# Patient Record
Sex: FEMALE | Race: WHITE | Employment: PART TIME | URBAN - METROPOLITAN AREA
[De-identification: names, ages, dates, MRNs, and addresses within clinical notes are randomized per-mention and may not be internally consistent; named-entity substitution may affect disease eponyms.]

---

## 2017-02-07 ENCOUNTER — HOSPITAL ENCOUNTER (OUTPATIENT)
Dept: GENERAL RADIOLOGY | Age: 37
Discharge: HOME | End: 2017-02-07
Attending: PSYCHIATRY & NEUROLOGY | Admitting: PSYCHIATRY & NEUROLOGY

## 2017-02-07 ENCOUNTER — OFFICE VISIT (OUTPATIENT)
Dept: FAMILY MEDICINE CLINIC | Age: 37
End: 2017-02-07

## 2017-02-07 VITALS
WEIGHT: 115 LBS | SYSTOLIC BLOOD PRESSURE: 104 MMHG | HEART RATE: 76 BPM | BODY MASS INDEX: 20.37 KG/M2 | TEMPERATURE: 98.4 F | DIASTOLIC BLOOD PRESSURE: 70 MMHG

## 2017-02-07 DIAGNOSIS — J01.00 ACUTE NON-RECURRENT MAXILLARY SINUSITIS: Primary | ICD-10-CM

## 2017-02-07 LAB
ALBUMIN SERPL-MCNC: 4.1 G/DL (ref 3.4–5)
ALP BLD-CCNC: 54 U/L (ref 40–129)
ALT SERPL-CCNC: 17 U/L (ref 10–40)
AST SERPL-CCNC: 22 U/L (ref 15–37)
BASOPHILS ABSOLUTE: 0.1 K/UL (ref 0–0.2)
BASOPHILS RELATIVE PERCENT: 1 %
BILIRUB SERPL-MCNC: <0.2 MG/DL (ref 0–1)
BILIRUBIN DIRECT: <0.2 MG/DL (ref 0–0.3)
BILIRUBIN, INDIRECT: NORMAL MG/DL (ref 0–1)
EOSINOPHILS ABSOLUTE: 0 K/UL (ref 0–0.6)
EOSINOPHILS RELATIVE PERCENT: 0.8 %
HCT VFR BLD CALC: 39.2 % (ref 36–48)
HEMOGLOBIN: 13.4 G/DL (ref 12–16)
INR BLD: 0.94 (ref 0.85–1.16)
LYMPHOCYTES ABSOLUTE: 2 K/UL (ref 1–5.1)
LYMPHOCYTES RELATIVE PERCENT: 35.1 %
MCH RBC QN AUTO: 32 PG (ref 26–34)
MCHC RBC AUTO-ENTMCNC: 34.2 G/DL (ref 31–36)
MCV RBC AUTO: 93.6 FL (ref 80–100)
MONOCYTES ABSOLUTE: 0.6 K/UL (ref 0–1.3)
MONOCYTES RELATIVE PERCENT: 9.9 %
NEUTROPHILS ABSOLUTE: 3.1 K/UL (ref 1.7–7.7)
NEUTROPHILS RELATIVE PERCENT: 53.2 %
PDW BLD-RTO: 12.8 % (ref 12.4–15.4)
PLATELET # BLD: 156 K/UL (ref 135–450)
PMV BLD AUTO: 9.4 FL (ref 5–10.5)
PROTHROMBIN TIME: 10.7 SEC (ref 9.8–13)
RBC # BLD: 4.19 M/UL (ref 4–5.2)
TOTAL PROTEIN: 7 G/DL (ref 6.4–8.2)
WBC # BLD: 5.8 K/UL (ref 4–11)

## 2017-02-07 PROCEDURE — 99213 OFFICE O/P EST LOW 20 MIN: CPT | Performed by: NURSE PRACTITIONER

## 2017-02-07 RX ORDER — FLUCONAZOLE 150 MG/1
150 TABLET ORAL ONCE
Qty: 1 TABLET | Refills: 0 | Status: SHIPPED | OUTPATIENT
Start: 2017-02-07 | End: 2017-02-07

## 2017-02-07 RX ORDER — CEFDINIR 300 MG/1
300 CAPSULE ORAL 2 TIMES DAILY
Qty: 14 CAPSULE | Refills: 0 | Status: SHIPPED | OUTPATIENT
Start: 2017-02-07 | End: 2017-02-14

## 2017-02-07 RX ORDER — FLUTICASONE PROPIONATE 50 MCG
1 SPRAY, SUSPENSION (ML) NASAL DAILY
Qty: 1 BOTTLE | Refills: 3 | Status: SHIPPED | OUTPATIENT
Start: 2017-02-07 | End: 2019-07-09 | Stop reason: SDUPTHER

## 2017-02-07 ASSESSMENT — ENCOUNTER SYMPTOMS
SORE THROAT: 1
COUGH: 0
WHEEZING: 0
SPUTUM PRODUCTION: 0
SHORTNESS OF BREATH: 0

## 2017-04-06 ENCOUNTER — OFFICE VISIT (OUTPATIENT)
Dept: FAMILY MEDICINE CLINIC | Age: 37
End: 2017-04-06

## 2017-04-06 VITALS
OXYGEN SATURATION: 99 % | SYSTOLIC BLOOD PRESSURE: 124 MMHG | DIASTOLIC BLOOD PRESSURE: 70 MMHG | WEIGHT: 118 LBS | BODY MASS INDEX: 20.9 KG/M2 | RESPIRATION RATE: 16 BRPM | HEART RATE: 84 BPM

## 2017-04-06 DIAGNOSIS — R11.0 NAUSEA: ICD-10-CM

## 2017-04-06 DIAGNOSIS — N39.0 URINARY TRACT INFECTION WITHOUT HEMATURIA, SITE UNSPECIFIED: Primary | ICD-10-CM

## 2017-04-06 DIAGNOSIS — R30.0 DYSURIA: ICD-10-CM

## 2017-04-06 LAB
BILIRUBIN, POC: NORMAL
BLOOD URINE, POC: NORMAL
CLARITY, POC: NORMAL
COLOR, POC: YELLOW
GLUCOSE URINE, POC: NORMAL
KETONES, POC: NORMAL
LEUKOCYTE EST, POC: NORMAL
NITRITE, POC: NORMAL
PH, POC: 5
PROTEIN, POC: NORMAL
SPECIFIC GRAVITY, POC: 1.02
UROBILINOGEN, POC: NORMAL

## 2017-04-06 PROCEDURE — 99213 OFFICE O/P EST LOW 20 MIN: CPT | Performed by: NURSE PRACTITIONER

## 2017-04-06 PROCEDURE — 81002 URINALYSIS NONAUTO W/O SCOPE: CPT | Performed by: NURSE PRACTITIONER

## 2017-04-06 RX ORDER — NITROFURANTOIN 25; 75 MG/1; MG/1
100 CAPSULE ORAL 2 TIMES DAILY
Qty: 14 CAPSULE | Refills: 0 | Status: SHIPPED | OUTPATIENT
Start: 2017-04-06 | End: 2017-04-13

## 2017-04-06 RX ORDER — ONDANSETRON 4 MG/1
4 TABLET, FILM COATED ORAL EVERY 8 HOURS PRN
Qty: 20 TABLET | Refills: 0 | Status: SHIPPED | OUTPATIENT
Start: 2017-04-06 | End: 2017-07-20 | Stop reason: ALTCHOICE

## 2017-04-06 ASSESSMENT — ENCOUNTER SYMPTOMS: NAUSEA: 1

## 2017-04-09 LAB
ORGANISM: ABNORMAL
URINE CULTURE, ROUTINE: ABNORMAL

## 2017-04-13 ENCOUNTER — TELEPHONE (OUTPATIENT)
Dept: FAMILY MEDICINE CLINIC | Age: 37
End: 2017-04-13

## 2017-04-13 DIAGNOSIS — Z12.31 ENCOUNTER FOR SCREENING MAMMOGRAM FOR BREAST CANCER: Primary | ICD-10-CM

## 2017-04-13 DIAGNOSIS — R92.8 ABNORMAL FINDINGS ON DIAGNOSTIC IMAGING OF BREAST: ICD-10-CM

## 2017-04-27 DIAGNOSIS — F17.200 TOBACCO USE DISORDER: Primary | ICD-10-CM

## 2017-04-27 RX ORDER — VARENICLINE TARTRATE 25 MG
KIT ORAL
Qty: 53 EACH | Refills: 0 | Status: SHIPPED | OUTPATIENT
Start: 2017-04-27 | End: 2017-05-01 | Stop reason: SDUPTHER

## 2017-04-27 RX ORDER — VARENICLINE TARTRATE 1 MG/1
1 TABLET, FILM COATED ORAL 2 TIMES DAILY
Qty: 60 TABLET | Refills: 3 | Status: SHIPPED | OUTPATIENT
Start: 2017-04-27 | End: 2017-05-01 | Stop reason: SDUPTHER

## 2017-05-01 ENCOUNTER — TELEPHONE (OUTPATIENT)
Dept: FAMILY MEDICINE CLINIC | Age: 37
End: 2017-05-01

## 2017-05-01 DIAGNOSIS — F17.200 TOBACCO USE DISORDER: ICD-10-CM

## 2017-05-01 RX ORDER — VARENICLINE TARTRATE 25 MG
KIT ORAL
Qty: 53 EACH | Refills: 0 | Status: SHIPPED | OUTPATIENT
Start: 2017-05-01 | End: 2018-05-30 | Stop reason: ALTCHOICE

## 2017-05-01 RX ORDER — VARENICLINE TARTRATE 1 MG/1
1 TABLET, FILM COATED ORAL 2 TIMES DAILY
Qty: 60 TABLET | Refills: 3 | Status: SHIPPED | OUTPATIENT
Start: 2017-05-01 | End: 2018-05-30 | Stop reason: SDUPTHER

## 2017-05-31 ENCOUNTER — TELEPHONE (OUTPATIENT)
Dept: FAMILY MEDICINE CLINIC | Age: 37
End: 2017-05-31

## 2017-06-12 ENCOUNTER — TELEPHONE (OUTPATIENT)
Dept: FAMILY MEDICINE CLINIC | Age: 37
End: 2017-06-12

## 2017-06-15 ENCOUNTER — TELEPHONE (OUTPATIENT)
Dept: FAMILY MEDICINE CLINIC | Age: 37
End: 2017-06-15

## 2017-06-16 ENCOUNTER — HOSPITAL ENCOUNTER (OUTPATIENT)
Dept: MAMMOGRAPHY | Age: 37
Discharge: HOME | End: 2017-06-16
Attending: SURGERY | Admitting: SURGERY

## 2017-06-16 DIAGNOSIS — Z12.31 ENCOUNTER FOR SCREENING MAMMOGRAM FOR BREAST CANCER: ICD-10-CM

## 2017-07-07 ENCOUNTER — TELEPHONE (OUTPATIENT)
Dept: FAMILY MEDICINE CLINIC | Age: 37
End: 2017-07-07

## 2017-07-20 ENCOUNTER — OFFICE VISIT (OUTPATIENT)
Dept: FAMILY MEDICINE CLINIC | Age: 37
End: 2017-07-20

## 2017-07-20 VITALS
DIASTOLIC BLOOD PRESSURE: 76 MMHG | OXYGEN SATURATION: 98 % | HEIGHT: 63 IN | BODY MASS INDEX: 20.38 KG/M2 | HEART RATE: 87 BPM | WEIGHT: 115 LBS | SYSTOLIC BLOOD PRESSURE: 108 MMHG

## 2017-07-20 DIAGNOSIS — F41.9 ANXIETY: ICD-10-CM

## 2017-07-20 DIAGNOSIS — G89.29 CHRONIC RIGHT SHOULDER PAIN: Primary | ICD-10-CM

## 2017-07-20 DIAGNOSIS — M25.511 CHRONIC RIGHT SHOULDER PAIN: Primary | ICD-10-CM

## 2017-07-20 PROCEDURE — 99214 OFFICE O/P EST MOD 30 MIN: CPT | Performed by: FAMILY MEDICINE

## 2017-07-20 RX ORDER — VALACYCLOVIR HYDROCHLORIDE 1 G/1
TABLET, FILM COATED ORAL
Qty: 4 TABLET | Refills: 1 | Status: SHIPPED | OUTPATIENT
Start: 2017-07-20 | End: 2018-05-30 | Stop reason: SDUPTHER

## 2017-08-22 ENCOUNTER — TELEPHONE (OUTPATIENT)
Dept: FAMILY MEDICINE CLINIC | Age: 37
End: 2017-08-22

## 2017-09-05 ENCOUNTER — TELEPHONE (OUTPATIENT)
Dept: FAMILY MEDICINE CLINIC | Age: 37
End: 2017-09-05

## 2017-09-20 DIAGNOSIS — M25.511 CHRONIC RIGHT SHOULDER PAIN: ICD-10-CM

## 2017-09-20 DIAGNOSIS — G89.29 CHRONIC RIGHT SHOULDER PAIN: ICD-10-CM

## 2017-12-01 ENCOUNTER — OFFICE VISIT (OUTPATIENT)
Dept: FAMILY MEDICINE CLINIC | Age: 37
End: 2017-12-01

## 2017-12-01 ENCOUNTER — TELEPHONE (OUTPATIENT)
Dept: FAMILY MEDICINE CLINIC | Age: 37
End: 2017-12-01

## 2017-12-01 VITALS
TEMPERATURE: 98.3 F | HEART RATE: 83 BPM | DIASTOLIC BLOOD PRESSURE: 64 MMHG | SYSTOLIC BLOOD PRESSURE: 110 MMHG | OXYGEN SATURATION: 98 % | WEIGHT: 119 LBS | BODY MASS INDEX: 21.08 KG/M2

## 2017-12-01 DIAGNOSIS — J39.2 LESION OF THROAT: Primary | ICD-10-CM

## 2017-12-01 PROCEDURE — 99213 OFFICE O/P EST LOW 20 MIN: CPT | Performed by: NURSE PRACTITIONER

## 2017-12-01 ASSESSMENT — ENCOUNTER SYMPTOMS
TROUBLE SWALLOWING: 0
SORE THROAT: 0

## 2017-12-01 NOTE — TELEPHONE ENCOUNTER
Referral request form completed and faxed to [address] [place] [address].  I will call patient when we receive the approval.

## 2017-12-01 NOTE — PROGRESS NOTES
Subjective:      Patient ID: Edmund Vieyra is a 40 y.o. female. HPI pt is here with area on her tonsils, that hurt when she hit it with water pick. Pt is worried about cancer since she is a smoker. Pt has fullness in ears and flem. Pt said nose starts running, every evening for the last year. Pt noticed the area on her tonsils last week. Review of Systems   Constitutional: Negative for chills, fatigue and fever. HENT: Negative for sore throat and trouble swallowing. All other systems reviewed and are negative. Objective:   Physical Exam   Constitutional: She is oriented to person, place, and time. She appears well-developed and well-nourished. HENT:   0.5cm lesion on right side of throat behind tonsil, yellow. Neurological: She is alert and oriented to person, place, and time. Skin: Skin is warm and dry. Psychiatric: She has a normal mood and affect. Her behavior is normal. Judgment and thought content normal.   Vitals reviewed. Assessment:      1. Lesion of throat  Ambulatory referral to ENT           Plan:      Josy Vitale was seen today for other. Diagnoses and all orders for this visit:    Lesion of throat - Unsure of etiology of lesion in the right side of throat. We'll send patient to ENT. Patient does smoke therefore patient is concerned about cancer. Patient could've had lesion for a long time and just noticed recently. Patient educated to really consider to quit smoking. Patient states understanding.  -     Ambulatory referral to ENT      Patient follow-up with ENT.

## 2017-12-01 NOTE — TELEPHONE ENCOUNTER
Oli Ball   1980    Is requesting a referral to see the following specialist:     Name of provider / Doctor: Dion Mcdaniel  Phone   [phone]     Fax. [phone]  Specialty:  ENT  NPI #  If available:  [id number]  TAX ID# If available:  Specialist appointment scheduled (DOS)? ASAP  Primary reason / diagnosis (ICD. 10code) for the appointment:    J39.2 (ICD-10-CM) - Lesion of throat  Primary Insurance:     [employer] insurance)

## 2017-12-08 ENCOUNTER — TELEPHONE (OUTPATIENT)
Dept: FAMILY MEDICINE CLINIC | Age: 37
End: 2017-12-08

## 2017-12-08 NOTE — TELEPHONE ENCOUNTER
Patient left  at 12:44 about her referral to ENT, stated when she called to schedule the appointment they told her she needed to see a different doctor. She needs to know if the information needs changed and has some other questions for [facility], [address]. Please call the patient.

## 2018-02-16 ENCOUNTER — TELEPHONE (OUTPATIENT)
Dept: FAMILY MEDICINE CLINIC | Age: 38
End: 2018-02-16

## 2018-02-16 DIAGNOSIS — F32.A DEPRESSION, UNSPECIFIED DEPRESSION TYPE: Primary | ICD-10-CM

## 2018-04-09 ENCOUNTER — TELEPHONE (OUTPATIENT)
Dept: FAMILY MEDICINE CLINIC | Age: 38
End: 2018-04-09

## 2018-05-21 ENCOUNTER — TELEPHONE (OUTPATIENT)
Dept: FAMILY MEDICINE CLINIC | Age: 38
End: 2018-05-21

## 2018-05-21 DIAGNOSIS — Z00.00 WELL WOMAN EXAM (NO GYNECOLOGICAL EXAM): Primary | ICD-10-CM

## 2018-05-30 ENCOUNTER — OFFICE VISIT (OUTPATIENT)
Dept: FAMILY MEDICINE CLINIC | Age: 38
End: 2018-05-30

## 2018-05-30 VITALS
OXYGEN SATURATION: 99 % | HEIGHT: 63 IN | SYSTOLIC BLOOD PRESSURE: 108 MMHG | HEART RATE: 118 BPM | RESPIRATION RATE: 16 BRPM | DIASTOLIC BLOOD PRESSURE: 72 MMHG

## 2018-05-30 DIAGNOSIS — F41.9 ANXIETY: ICD-10-CM

## 2018-05-30 DIAGNOSIS — F90.9 ATTENTION DEFICIT HYPERACTIVITY DISORDER (ADHD), UNSPECIFIED ADHD TYPE: Primary | ICD-10-CM

## 2018-05-30 DIAGNOSIS — F17.200 TOBACCO USE DISORDER: ICD-10-CM

## 2018-05-30 PROCEDURE — 99214 OFFICE O/P EST MOD 30 MIN: CPT | Performed by: FAMILY MEDICINE

## 2018-05-30 RX ORDER — VALACYCLOVIR HYDROCHLORIDE 1 G/1
TABLET, FILM COATED ORAL
Qty: 4 TABLET | Refills: 1 | Status: SHIPPED | OUTPATIENT
Start: 2018-05-30 | End: 2019-12-03 | Stop reason: SDUPTHER

## 2018-05-30 RX ORDER — METHYLPHENIDATE HYDROCHLORIDE 20 MG/1
20 CAPSULE, EXTENDED RELEASE ORAL EVERY MORNING
Qty: 30 CAPSULE | Refills: 0 | Status: SHIPPED | OUTPATIENT
Start: 2018-05-30 | End: 2018-08-02

## 2018-05-30 RX ORDER — VARENICLINE TARTRATE 1 MG/1
1 TABLET, FILM COATED ORAL 2 TIMES DAILY
Qty: 60 TABLET | Refills: 3 | Status: SHIPPED | OUTPATIENT
Start: 2018-05-30 | End: 2019-07-09 | Stop reason: SDUPTHER

## 2018-06-19 ENCOUNTER — OFFICE VISIT (OUTPATIENT)
Dept: FAMILY MEDICINE CLINIC | Age: 38
End: 2018-06-19

## 2018-06-19 VITALS
WEIGHT: 124 LBS | HEART RATE: 96 BPM | SYSTOLIC BLOOD PRESSURE: 114 MMHG | BODY MASS INDEX: 21.97 KG/M2 | DIASTOLIC BLOOD PRESSURE: 70 MMHG | OXYGEN SATURATION: 99 %

## 2018-06-19 DIAGNOSIS — F90.9 ATTENTION DEFICIT HYPERACTIVITY DISORDER (ADHD), UNSPECIFIED ADHD TYPE: Primary | ICD-10-CM

## 2018-06-19 PROCEDURE — 99213 OFFICE O/P EST LOW 20 MIN: CPT | Performed by: FAMILY MEDICINE

## 2018-06-19 ASSESSMENT — PATIENT HEALTH QUESTIONNAIRE - PHQ9
1. LITTLE INTEREST OR PLEASURE IN DOING THINGS: 1
SUM OF ALL RESPONSES TO PHQ QUESTIONS 1-9: 1
SUM OF ALL RESPONSES TO PHQ9 QUESTIONS 1 & 2: 1
2. FEELING DOWN, DEPRESSED OR HOPELESS: 0

## 2018-07-19 ENCOUNTER — TELEPHONE (OUTPATIENT)
Dept: FAMILY MEDICINE CLINIC | Age: 38
End: 2018-07-19

## 2018-07-19 NOTE — TELEPHONE ENCOUNTER
Patient called and states that she needs a referral sent to [facility] for a mammogram/Jose to Dr. Helena Kawasaki at the [facility]. Patient states that her appiontment on 07.20. 18.

## 2018-07-20 ENCOUNTER — OFFICE VISIT (OUTPATIENT)
Dept: FAMILY MEDICINE CLINIC | Age: 38
End: 2018-07-20

## 2018-07-20 ENCOUNTER — HOSPITAL ENCOUNTER (OUTPATIENT)
Dept: WOMENS IMAGING | Age: 38
Discharge: HOME | End: 2018-07-20
Payer: OTHER GOVERNMENT

## 2018-07-20 VITALS
HEART RATE: 93 BPM | OXYGEN SATURATION: 98 % | WEIGHT: 122 LBS | DIASTOLIC BLOOD PRESSURE: 68 MMHG | SYSTOLIC BLOOD PRESSURE: 104 MMHG | BODY MASS INDEX: 21.61 KG/M2

## 2018-07-20 DIAGNOSIS — F41.9 ANXIETY: Primary | ICD-10-CM

## 2018-07-20 DIAGNOSIS — F90.9 ATTENTION DEFICIT HYPERACTIVITY DISORDER (ADHD), UNSPECIFIED ADHD TYPE: ICD-10-CM

## 2018-07-20 DIAGNOSIS — Z12.31 ENCOUNTER FOR SCREENING MAMMOGRAM FOR BREAST CANCER: ICD-10-CM

## 2018-07-20 PROCEDURE — 77067 SCR MAMMO BI INCL CAD: CPT

## 2018-07-20 PROCEDURE — 99213 OFFICE O/P EST LOW 20 MIN: CPT | Performed by: FAMILY MEDICINE

## 2018-07-20 RX ORDER — METHYLPHENIDATE HYDROCHLORIDE 20 MG/1
20 CAPSULE, EXTENDED RELEASE ORAL EVERY MORNING
Qty: 30 CAPSULE | Refills: 0 | Status: CANCELLED | OUTPATIENT
Start: 2018-07-20 | End: 2018-08-19

## 2018-07-20 RX ORDER — HYDROXYZINE PAMOATE 25 MG/1
25 CAPSULE ORAL 4 TIMES DAILY PRN
Qty: 60 CAPSULE | Refills: 3 | Status: SHIPPED | OUTPATIENT
Start: 2018-07-20 | End: 2021-10-19 | Stop reason: SDUPTHER

## 2018-07-23 NOTE — PROGRESS NOTES
Subjective:      Patient ID: Yin Malcolm is a 45 y.o. female. Yin Malcolm is a 45 y.o. female. Patient presents with:  ADHD        ADHD:  Has been well controlled. Taking medications regularly. Denies chest pain or side effects from the medications. Notes she is sleeping well and not having any problems with appetite. The patients PMH, surgical history, family history, medications, allergies were all reviewed and updated as appropriate today. Fatigue   Associated symptoms include fatigue. Review of Systems   Constitutional: Positive for fatigue. Objective:   Physical Exam   Constitutional: She is oriented to person, place, and time. She appears well-developed and well-nourished. HENT:   Head: Normocephalic and atraumatic. Right Ear: External ear normal.   Left Ear: External ear normal.   Nose: Nose normal.   Mouth/Throat: Oropharynx is clear and moist.   Eyes: Conjunctivae are normal. Pupils are equal, round, and reactive to light. Neck: Normal range of motion. Neck supple. Cardiovascular: Normal rate, regular rhythm, normal heart sounds and intact distal pulses. Pulmonary/Chest: Effort normal and breath sounds normal.   Abdominal: Soft. Bowel sounds are normal.   Musculoskeletal: Normal range of motion. Neurological: She is alert and oriented to person, place, and time. She has normal reflexes. Skin: Skin is dry. Psychiatric: She has a normal mood and affect. Her behavior is normal. Judgment and thought content normal.   Nursing note and vitals reviewed. Assessment:      Encounter Diagnoses   Name Primary?  Attention deficit hyperactivity disorder (ADHD), unspecified ADHD type     Anxiety Yes           Plan:      1. Attention deficit hyperactivity disorder (ADHD), unspecified ADHD type    - Lisdexamfetamine Dimesylate (VYVANSE) 60 MG CAPS; Take 60 mg by mouth daily for 30 days. .  Dispense: 30 capsule; Refill: 0    2.  Anxiety    - hydrOXYzine (VISTARIL) 25 MG

## 2018-08-02 ENCOUNTER — OFFICE VISIT (OUTPATIENT)
Dept: FAMILY MEDICINE CLINIC | Age: 38
End: 2018-08-02

## 2018-08-02 VITALS
BODY MASS INDEX: 21.26 KG/M2 | WEIGHT: 120 LBS | OXYGEN SATURATION: 99 % | DIASTOLIC BLOOD PRESSURE: 78 MMHG | HEIGHT: 63 IN | HEART RATE: 106 BPM | SYSTOLIC BLOOD PRESSURE: 122 MMHG

## 2018-08-02 DIAGNOSIS — T38.0X5A ADVERSE EFFECT OF CORTICOSTEROIDS, INITIAL ENCOUNTER: Primary | ICD-10-CM

## 2018-08-02 DIAGNOSIS — R35.0 URINE FREQUENCY: ICD-10-CM

## 2018-08-02 LAB
BILIRUBIN, POC: NEGATIVE
BLOOD URINE, POC: NEGATIVE
CLARITY, POC: CLEAR
COLOR, POC: YELLOW
GLUCOSE URINE, POC: NEGATIVE
KETONES, POC: NEGATIVE
LEUKOCYTE EST, POC: NEGATIVE
NITRITE, POC: NEGATIVE
PH, POC: 6
PROTEIN, POC: NEGATIVE
SPECIFIC GRAVITY, POC: 1.02
UROBILINOGEN, POC: 0.2

## 2018-08-02 PROCEDURE — 99213 OFFICE O/P EST LOW 20 MIN: CPT | Performed by: FAMILY MEDICINE

## 2018-08-02 PROCEDURE — 81002 URINALYSIS NONAUTO W/O SCOPE: CPT | Performed by: FAMILY MEDICINE

## 2018-08-02 NOTE — PROGRESS NOTES
likely secondary to having been on steroids recently. Patient was told that in the next couple days if symptoms don't resolve she should call back.

## 2018-08-17 ENCOUNTER — OFFICE VISIT (OUTPATIENT)
Dept: FAMILY MEDICINE CLINIC | Age: 38
End: 2018-08-17

## 2018-08-17 VITALS
HEART RATE: 105 BPM | OXYGEN SATURATION: 99 % | DIASTOLIC BLOOD PRESSURE: 66 MMHG | SYSTOLIC BLOOD PRESSURE: 120 MMHG | BODY MASS INDEX: 21.43 KG/M2 | WEIGHT: 121 LBS

## 2018-08-17 DIAGNOSIS — F90.9 ATTENTION DEFICIT HYPERACTIVITY DISORDER (ADHD), UNSPECIFIED ADHD TYPE: ICD-10-CM

## 2018-08-17 PROCEDURE — 99213 OFFICE O/P EST LOW 20 MIN: CPT | Performed by: FAMILY MEDICINE

## 2018-08-27 NOTE — PROGRESS NOTES
Subjective:      Patient ID: Alma Galan is a 45 y.o. female. Alma Galan is a 45 y.o. female. Patient presents with:  ADHD  Fatigue: stopped all vitamins for surgery       ADHD:  Has been well controlled. Taking medications regularly. Denies chest pain or side effects from the medications. Notes she is sleeping well and not having any problems with appetite. The patients PMH, surgical history, family history, medications, allergies were all reviewed and updated as appropriate today. Fatigue   Associated symptoms include fatigue. Review of Systems   Constitutional: Positive for fatigue. Objective:   Physical Exam   Constitutional: She is oriented to person, place, and time. She appears well-developed and well-nourished. HENT:   Head: Normocephalic and atraumatic. Right Ear: External ear normal.   Left Ear: External ear normal.   Nose: Nose normal.   Mouth/Throat: Oropharynx is clear and moist.   Eyes: Pupils are equal, round, and reactive to light. Conjunctivae are normal.   Neck: Normal range of motion. Neck supple. Cardiovascular: Normal rate, regular rhythm, normal heart sounds and intact distal pulses. Pulmonary/Chest: Effort normal and breath sounds normal.   Abdominal: Soft. Bowel sounds are normal.   Musculoskeletal: Normal range of motion. Neurological: She is alert and oriented to person, place, and time. She has normal reflexes. Skin: Skin is dry. Psychiatric: She has a normal mood and affect. Her behavior is normal. Judgment and thought content normal.   Nursing note and vitals reviewed. Assessment:      Encounter Diagnosis   Name Primary?  Attention deficit hyperactivity disorder (ADHD), unspecified ADHD type            Plan:     1. Attention deficit hyperactivity disorder (ADHD), unspecified ADHD type    - lisdexamfetamine (VYVANSE) 70 MG capsule; Take 1 capsule by mouth every morning for 30 days. .  Dispense: 30 capsule;  Refill: 0

## 2018-09-20 DIAGNOSIS — F90.9 ATTENTION DEFICIT HYPERACTIVITY DISORDER (ADHD), UNSPECIFIED ADHD TYPE: ICD-10-CM

## 2018-09-28 ENCOUNTER — TELEPHONE (OUTPATIENT)
Dept: FAMILY MEDICINE CLINIC | Age: 38
End: 2018-09-28

## 2018-09-28 NOTE — TELEPHONE ENCOUNTER
Patient called and stated that she would like to speak with Rehan. She said that she has come concerns that she needed to talk to her about. I let her know that Rehan is out of the office today and that I could have Rehan call her on Monday when she is back in the office and the patient stated that that was fine. Patient did not give me any information as to what it is regarding, just that she had some concerns that she needed to talk to Lisandraay about.

## 2018-10-29 DIAGNOSIS — F90.9 ATTENTION DEFICIT HYPERACTIVITY DISORDER (ADHD), UNSPECIFIED ADHD TYPE: ICD-10-CM

## 2018-10-29 NOTE — TELEPHONE ENCOUNTER
From: Mack Danielle  Sent: 10/28/2018 8:53 PM EDT  Subject: Medication Renewal Request    Nikhil Weir.  [facility] would like a refill of the following medications:     lisdexamfetamine (VYVANSE) 70 MG capsule Kim Sood MD]    Preferred pharmacy: [address] - F [phone]

## 2018-11-13 ENCOUNTER — TELEPHONE (OUTPATIENT)
Dept: FAMILY MEDICINE CLINIC | Age: 38
End: 2018-11-13

## 2018-11-13 ENCOUNTER — OFFICE VISIT (OUTPATIENT)
Dept: FAMILY MEDICINE CLINIC | Age: 38
End: 2018-11-13
Payer: OTHER GOVERNMENT

## 2018-11-13 VITALS
SYSTOLIC BLOOD PRESSURE: 114 MMHG | WEIGHT: 125 LBS | BODY MASS INDEX: 22.14 KG/M2 | DIASTOLIC BLOOD PRESSURE: 70 MMHG | OXYGEN SATURATION: 99 % | HEART RATE: 102 BPM

## 2018-11-13 DIAGNOSIS — F90.9 ATTENTION DEFICIT HYPERACTIVITY DISORDER (ADHD), UNSPECIFIED ADHD TYPE: Primary | ICD-10-CM

## 2018-11-13 DIAGNOSIS — F41.9 ANXIETY: ICD-10-CM

## 2018-11-13 PROCEDURE — 99213 OFFICE O/P EST LOW 20 MIN: CPT | Performed by: FAMILY MEDICINE

## 2018-11-13 RX ORDER — AMPHETAMINE SULFATE 10 MG/1
10 TABLET ORAL 2 TIMES DAILY
Qty: 60 TABLET | Refills: 0 | Status: SHIPPED | OUTPATIENT
Start: 2018-11-13 | End: 2019-01-25 | Stop reason: SDUPTHER

## 2018-11-13 RX ORDER — AMPHETAMINE SULFATE 10 MG/1
10 TABLET ORAL 2 TIMES DAILY
Qty: 60 TABLET | Refills: 0 | Status: SHIPPED | OUTPATIENT
Start: 2018-11-13 | End: 2018-11-13 | Stop reason: SDUPTHER

## 2018-11-13 NOTE — TELEPHONE ENCOUNTER
Boaz Morton   1980    Is requesting a referral to see the following specialist:     Name of provider / Doctor: Jaya Aburto    Phone. [phone]  Fax. Specialty: Counseling  NPI #  If available:  TAX ID# If available:  Specialist appointment scheduled (DOS)? Not scheduled yet  Primary reason / diagnosis (ICD. 10code) for the appointment: Anxiety F41.9  Primary Insurance: [employer] insurance)

## 2018-11-19 ENCOUNTER — TELEPHONE (OUTPATIENT)
Dept: FAMILY MEDICINE CLINIC | Age: 38
End: 2018-11-19

## 2018-11-20 NOTE — TELEPHONE ENCOUNTER
New referral to Dr. Heather Russell completed and faxed to [address] [place] [address]. Requested to backdate to 10/25/18. Form scanned to chart. Authorization is pending.

## 2018-11-24 ASSESSMENT — ENCOUNTER SYMPTOMS: COUGH: 1

## 2019-01-25 DIAGNOSIS — F90.9 ATTENTION DEFICIT HYPERACTIVITY DISORDER (ADHD), UNSPECIFIED ADHD TYPE: ICD-10-CM

## 2019-01-25 RX ORDER — AMPHETAMINE SULFATE 10 MG/1
10 TABLET ORAL 2 TIMES DAILY
Qty: 60 TABLET | Refills: 0 | Status: SHIPPED | OUTPATIENT
Start: 2019-01-25 | End: 2019-02-14 | Stop reason: SDUPTHER

## 2019-02-14 ENCOUNTER — OFFICE VISIT (OUTPATIENT)
Dept: FAMILY MEDICINE CLINIC | Age: 39
End: 2019-02-14
Payer: OTHER GOVERNMENT

## 2019-02-14 VITALS
SYSTOLIC BLOOD PRESSURE: 116 MMHG | OXYGEN SATURATION: 98 % | WEIGHT: 130 LBS | DIASTOLIC BLOOD PRESSURE: 78 MMHG | HEART RATE: 85 BPM | BODY MASS INDEX: 23.03 KG/M2

## 2019-02-14 DIAGNOSIS — F90.9 ATTENTION DEFICIT HYPERACTIVITY DISORDER (ADHD), UNSPECIFIED ADHD TYPE: ICD-10-CM

## 2019-02-14 PROCEDURE — 99213 OFFICE O/P EST LOW 20 MIN: CPT | Performed by: FAMILY MEDICINE

## 2019-02-14 RX ORDER — AMPHETAMINE SULFATE 10 MG/1
10 TABLET ORAL 2 TIMES DAILY
Qty: 60 TABLET | Refills: 0 | Status: SHIPPED | OUTPATIENT
Start: 2019-02-14 | End: 2019-04-08 | Stop reason: SDUPTHER

## 2019-02-14 ASSESSMENT — ENCOUNTER SYMPTOMS: COUGH: 1

## 2019-02-21 ENCOUNTER — TELEPHONE (OUTPATIENT)
Dept: FAMILY MEDICINE CLINIC | Age: 39
End: 2019-02-21

## 2019-03-05 ENCOUNTER — TELEPHONE (OUTPATIENT)
Dept: FAMILY MEDICINE CLINIC | Age: 39
End: 2019-03-05

## 2019-03-05 DIAGNOSIS — K21.9 LARYNGOPHARYNGEAL REFLUX (LPR): Primary | ICD-10-CM

## 2019-03-05 NOTE — TELEPHONE ENCOUNTER
Referral ordered. Pt informed. Referral still needs approved through [address] [place] [address].

## 2019-03-05 NOTE — TELEPHONE ENCOUNTER
Patient called and states that she saw Dr. Everardo Kuhn and he told her that she should see a GI for LPR. Patient states that Dr. Everardo Kuhn did not give her any names of GI doctors. Patient states that she needs to know who Dr. Yakov Washington would recommend. Patient is on  and will also need a referral sent to  once she decides to she is going to see.

## 2019-03-12 ENCOUNTER — INITIAL CONSULT (OUTPATIENT)
Dept: GASTROENTEROLOGY | Age: 39
End: 2019-03-12
Payer: OTHER GOVERNMENT

## 2019-03-12 VITALS
WEIGHT: 122 LBS | DIASTOLIC BLOOD PRESSURE: 70 MMHG | SYSTOLIC BLOOD PRESSURE: 118 MMHG | BODY MASS INDEX: 21.62 KG/M2 | HEIGHT: 63 IN

## 2019-03-12 DIAGNOSIS — R09.89 GLOBUS SENSATION: ICD-10-CM

## 2019-03-12 DIAGNOSIS — R13.10 DYSPHAGIA, UNSPECIFIED TYPE: Primary | ICD-10-CM

## 2019-03-12 PROBLEM — R09.A2 GLOBUS SENSATION: Status: ACTIVE | Noted: 2019-03-12

## 2019-03-12 PROCEDURE — 99204 OFFICE O/P NEW MOD 45 MIN: CPT | Performed by: INTERNAL MEDICINE

## 2019-03-20 RX ORDER — OMEPRAZOLE 20 MG/1
20 CAPSULE, DELAYED RELEASE ORAL DAILY
COMMUNITY

## 2019-03-20 SDOH — HEALTH STABILITY: MENTAL HEALTH: HOW OFTEN DO YOU HAVE A DRINK CONTAINING ALCOHOL?: NEVER

## 2019-03-21 NOTE — TELEPHONE ENCOUNTER
Patient called to see if the referral for GI was sent to [address] [place] [address]. Since Eb Cameron is no longer with [facility], I talked to Vicki Bumpers, who is now doing the referrals. I let her know that the patient had seen Dr. Hope Byrne on 03.12.19 and she will work on the backdated referral.      Barbie Raines,    I spoke to the patient today and she states that she is scheduled for a EGD on 03.27.19 at [facility]. Do you need to do the referral for the EGD as well ?

## 2019-03-26 ENCOUNTER — TELEPHONE (OUTPATIENT)
Dept: GASTROENTEROLOGY | Age: 39
End: 2019-03-26

## 2019-03-27 ENCOUNTER — ANESTHESIA EVENT (OUTPATIENT)
Dept: ENDOSCOPY | Age: 39
End: 2019-03-27
Payer: OTHER GOVERNMENT

## 2019-03-27 ENCOUNTER — ANESTHESIA (OUTPATIENT)
Dept: ENDOSCOPY | Age: 39
End: 2019-03-27
Payer: OTHER GOVERNMENT

## 2019-03-27 ENCOUNTER — HOSPITAL ENCOUNTER (OUTPATIENT)
Age: 39
Setting detail: OUTPATIENT SURGERY
Discharge: HOME | End: 2019-03-27
Attending: INTERNAL MEDICINE | Admitting: INTERNAL MEDICINE
Payer: OTHER GOVERNMENT

## 2019-03-27 VITALS
HEIGHT: 63 IN | WEIGHT: 127 LBS | RESPIRATION RATE: 20 BRPM | SYSTOLIC BLOOD PRESSURE: 105 MMHG | DIASTOLIC BLOOD PRESSURE: 75 MMHG | OXYGEN SATURATION: 97 % | BODY MASS INDEX: 22.5 KG/M2 | HEART RATE: 80 BPM | TEMPERATURE: 98.1 F

## 2019-03-27 VITALS — SYSTOLIC BLOOD PRESSURE: 108 MMHG | DIASTOLIC BLOOD PRESSURE: 71 MMHG | OXYGEN SATURATION: 98 %

## 2019-03-27 LAB — PREGNANCY, URINE: NEGATIVE

## 2019-03-27 PROCEDURE — 2709999900 HC NON-CHARGEABLE SUPPLY: Performed by: INTERNAL MEDICINE

## 2019-03-27 PROCEDURE — 3609012700 HC EGD DILATION SAVORY: Performed by: INTERNAL MEDICINE

## 2019-03-27 PROCEDURE — 2500000003 HC RX 250 WO HCPCS: Performed by: NURSE ANESTHETIST, CERTIFIED REGISTERED

## 2019-03-27 PROCEDURE — 88305 TISSUE EXAM BY PATHOLOGIST: CPT

## 2019-03-27 PROCEDURE — 7100000010 HC PHASE II RECOVERY - FIRST 15 MIN: Performed by: INTERNAL MEDICINE

## 2019-03-27 PROCEDURE — 6360000002 HC RX W HCPCS: Performed by: NURSE ANESTHETIST, CERTIFIED REGISTERED

## 2019-03-27 PROCEDURE — 2580000003 HC RX 258: Performed by: INTERNAL MEDICINE

## 2019-03-27 PROCEDURE — 3700000001 HC ADD 15 MINUTES (ANESTHESIA): Performed by: INTERNAL MEDICINE

## 2019-03-27 PROCEDURE — 3700000000 HC ANESTHESIA ATTENDED CARE: Performed by: INTERNAL MEDICINE

## 2019-03-27 PROCEDURE — 2580000003 HC RX 258: Performed by: NURSE ANESTHETIST, CERTIFIED REGISTERED

## 2019-03-27 PROCEDURE — 43248 EGD GUIDE WIRE INSERTION: CPT | Performed by: INTERNAL MEDICINE

## 2019-03-27 PROCEDURE — 3609012400 HC EGD TRANSORAL BIOPSY SINGLE/MULTIPLE: Performed by: INTERNAL MEDICINE

## 2019-03-27 PROCEDURE — 84703 CHORIONIC GONADOTROPIN ASSAY: CPT

## 2019-03-27 RX ORDER — LIDOCAINE HYDROCHLORIDE 20 MG/ML
INJECTION, SOLUTION INFILTRATION; PERINEURAL PRN
Status: DISCONTINUED | OUTPATIENT
Start: 2019-03-27 | End: 2019-03-27 | Stop reason: SDUPTHER

## 2019-03-27 RX ORDER — PROPOFOL 10 MG/ML
INJECTION, EMULSION INTRAVENOUS PRN
Status: DISCONTINUED | OUTPATIENT
Start: 2019-03-27 | End: 2019-03-27 | Stop reason: SDUPTHER

## 2019-03-27 RX ORDER — SODIUM CHLORIDE, SODIUM LACTATE, POTASSIUM CHLORIDE, CALCIUM CHLORIDE 600; 310; 30; 20 MG/100ML; MG/100ML; MG/100ML; MG/100ML
INJECTION, SOLUTION INTRAVENOUS CONTINUOUS PRN
Status: DISCONTINUED | OUTPATIENT
Start: 2019-03-27 | End: 2019-03-27 | Stop reason: SDUPTHER

## 2019-03-27 RX ORDER — SODIUM CHLORIDE, SODIUM LACTATE, POTASSIUM CHLORIDE, CALCIUM CHLORIDE 600; 310; 30; 20 MG/100ML; MG/100ML; MG/100ML; MG/100ML
1000 INJECTION, SOLUTION INTRAVENOUS ONCE
Status: COMPLETED | OUTPATIENT
Start: 2019-03-27 | End: 2019-03-27

## 2019-03-27 RX ADMIN — SODIUM CHLORIDE, POTASSIUM CHLORIDE, SODIUM LACTATE AND CALCIUM CHLORIDE 1000 ML: 600; 310; 30; 20 INJECTION, SOLUTION INTRAVENOUS at 09:02

## 2019-03-27 RX ADMIN — PROPOFOL 30 MG: 10 INJECTION, EMULSION INTRAVENOUS at 09:54

## 2019-03-27 RX ADMIN — PROPOFOL 20 MG: 10 INJECTION, EMULSION INTRAVENOUS at 09:52

## 2019-03-27 RX ADMIN — PROPOFOL 20 MG: 10 INJECTION, EMULSION INTRAVENOUS at 09:48

## 2019-03-27 RX ADMIN — LIDOCAINE HYDROCHLORIDE 40 MG: 20 INJECTION, SOLUTION INFILTRATION; PERINEURAL at 09:45

## 2019-03-27 RX ADMIN — PROPOFOL 20 MG: 10 INJECTION, EMULSION INTRAVENOUS at 09:46

## 2019-03-27 RX ADMIN — SODIUM CHLORIDE, POTASSIUM CHLORIDE, SODIUM LACTATE AND CALCIUM CHLORIDE: 600; 310; 30; 20 INJECTION, SOLUTION INTRAVENOUS at 09:41

## 2019-03-27 RX ADMIN — PROPOFOL 80 MG: 10 INJECTION, EMULSION INTRAVENOUS at 09:45

## 2019-03-27 RX ADMIN — PROPOFOL 30 MG: 10 INJECTION, EMULSION INTRAVENOUS at 09:50

## 2019-03-27 ASSESSMENT — PAIN SCALES - GENERAL: PAINLEVEL_OUTOF10: 0

## 2019-03-27 ASSESSMENT — LIFESTYLE VARIABLES: SMOKING_STATUS: 1

## 2019-04-03 ENCOUNTER — TELEPHONE (OUTPATIENT)
Dept: GASTROENTEROLOGY | Age: 39
End: 2019-04-03

## 2019-04-08 DIAGNOSIS — F90.9 ATTENTION DEFICIT HYPERACTIVITY DISORDER (ADHD), UNSPECIFIED ADHD TYPE: ICD-10-CM

## 2019-04-08 RX ORDER — AMPHETAMINE SULFATE 10 MG/1
10 TABLET ORAL 2 TIMES DAILY
Qty: 60 TABLET | Refills: 0 | Status: SHIPPED | OUTPATIENT
Start: 2019-04-08 | End: 2019-07-09 | Stop reason: SDUPTHER

## 2019-04-25 ENCOUNTER — TELEPHONE (OUTPATIENT)
Dept: GASTROENTEROLOGY | Age: 39
End: 2019-04-25

## 2019-04-25 DIAGNOSIS — R13.10 DYSPHAGIA, UNSPECIFIED TYPE: Primary | ICD-10-CM

## 2019-04-25 NOTE — TELEPHONE ENCOUNTER
Pt called in stating she is still having symptoms with swallowing multiple times and having the feeling of a lump in her throat. She would like to move forward with the Barium Swallow.

## 2019-05-09 ENCOUNTER — HOSPITAL ENCOUNTER (OUTPATIENT)
Dept: GENERAL RADIOLOGY | Age: 39
Discharge: HOME | End: 2019-05-09
Payer: OTHER GOVERNMENT

## 2019-05-09 DIAGNOSIS — R13.10 DYSPHAGIA, UNSPECIFIED TYPE: ICD-10-CM

## 2019-05-09 PROCEDURE — 74230 X-RAY XM SWLNG FUNCJ C+: CPT

## 2019-06-28 ENCOUNTER — TELEPHONE (OUTPATIENT)
Dept: FAMILY MEDICINE CLINIC | Age: 39
End: 2019-06-28

## 2019-07-09 ENCOUNTER — OFFICE VISIT (OUTPATIENT)
Dept: FAMILY MEDICINE CLINIC | Age: 39
End: 2019-07-09
Payer: OTHER GOVERNMENT

## 2019-07-09 ENCOUNTER — TELEPHONE (OUTPATIENT)
Dept: FAMILY MEDICINE CLINIC | Age: 39
End: 2019-07-09

## 2019-07-09 VITALS
WEIGHT: 120 LBS | OXYGEN SATURATION: 99 % | HEART RATE: 91 BPM | BODY MASS INDEX: 21.26 KG/M2 | SYSTOLIC BLOOD PRESSURE: 116 MMHG | DIASTOLIC BLOOD PRESSURE: 76 MMHG

## 2019-07-09 DIAGNOSIS — Z01.419 ENCOUNTER FOR WELL WOMAN EXAM WITH ROUTINE GYNECOLOGICAL EXAM: Primary | ICD-10-CM

## 2019-07-09 DIAGNOSIS — J01.00 ACUTE NON-RECURRENT MAXILLARY SINUSITIS: ICD-10-CM

## 2019-07-09 DIAGNOSIS — F17.200 TOBACCO USE DISORDER: ICD-10-CM

## 2019-07-09 DIAGNOSIS — F90.9 ATTENTION DEFICIT HYPERACTIVITY DISORDER (ADHD), UNSPECIFIED ADHD TYPE: ICD-10-CM

## 2019-07-09 DIAGNOSIS — Z01.419 WELL WOMAN EXAM: ICD-10-CM

## 2019-07-09 DIAGNOSIS — M25.561 ACUTE PAIN OF RIGHT KNEE: ICD-10-CM

## 2019-07-09 DIAGNOSIS — Z80.3 FAMILY HISTORY OF BREAST CANCER: ICD-10-CM

## 2019-07-09 LAB
A/G RATIO: 2.2 (ref 1.1–2.2)
ALBUMIN SERPL-MCNC: 5.1 G/DL (ref 3.4–5)
ALP BLD-CCNC: 61 U/L (ref 40–129)
ALT SERPL-CCNC: 11 U/L (ref 10–40)
ANION GAP SERPL CALCULATED.3IONS-SCNC: 13 MMOL/L (ref 3–16)
AST SERPL-CCNC: 20 U/L (ref 15–37)
BASOPHILS ABSOLUTE: 0 K/UL (ref 0–0.2)
BASOPHILS RELATIVE PERCENT: 1 %
BILIRUB SERPL-MCNC: 0.5 MG/DL (ref 0–1)
BUN BLDV-MCNC: 14 MG/DL (ref 7–20)
CALCIUM SERPL-MCNC: 9.7 MG/DL (ref 8.3–10.6)
CHLORIDE BLD-SCNC: 102 MMOL/L (ref 99–110)
CHOLESTEROL, TOTAL: 173 MG/DL (ref 0–199)
CO2: 27 MMOL/L (ref 21–32)
CREAT SERPL-MCNC: 0.7 MG/DL (ref 0.6–1.1)
EOSINOPHILS ABSOLUTE: 0 K/UL (ref 0–0.6)
EOSINOPHILS RELATIVE PERCENT: 0.7 %
GFR AFRICAN AMERICAN: >60
GFR NON-AFRICAN AMERICAN: >60
GLOBULIN: 2.3 G/DL
GLUCOSE BLD-MCNC: 99 MG/DL (ref 70–99)
HCT VFR BLD CALC: 42.2 % (ref 36–48)
HDLC SERPL-MCNC: 66 MG/DL (ref 40–60)
HEMOGLOBIN: 14.3 G/DL (ref 12–16)
LDL CHOLESTEROL CALCULATED: 95 MG/DL
LYMPHOCYTES ABSOLUTE: 1.8 K/UL (ref 1–5.1)
LYMPHOCYTES RELATIVE PERCENT: 39 %
MCH RBC QN AUTO: 32.7 PG (ref 26–34)
MCHC RBC AUTO-ENTMCNC: 33.8 G/DL (ref 31–36)
MCV RBC AUTO: 96.6 FL (ref 80–100)
MONOCYTES ABSOLUTE: 0.4 K/UL (ref 0–1.3)
MONOCYTES RELATIVE PERCENT: 8.9 %
NEUTROPHILS ABSOLUTE: 2.4 K/UL (ref 1.7–7.7)
NEUTROPHILS RELATIVE PERCENT: 50.4 %
PDW BLD-RTO: 13.3 % (ref 12.4–15.4)
PLATELET # BLD: 219 K/UL (ref 135–450)
PMV BLD AUTO: 9.4 FL (ref 5–10.5)
POTASSIUM SERPL-SCNC: 3.7 MMOL/L (ref 3.5–5.1)
RBC # BLD: 4.37 M/UL (ref 4–5.2)
SODIUM BLD-SCNC: 142 MMOL/L (ref 136–145)
TOTAL PROTEIN: 7.4 G/DL (ref 6.4–8.2)
TRIGL SERPL-MCNC: 60 MG/DL (ref 0–150)
VLDLC SERPL CALC-MCNC: 12 MG/DL
WBC # BLD: 4.7 K/UL (ref 4–11)

## 2019-07-09 PROCEDURE — 99214 OFFICE O/P EST MOD 30 MIN: CPT | Performed by: FAMILY MEDICINE

## 2019-07-09 PROCEDURE — 20610 DRAIN/INJ JOINT/BURSA W/O US: CPT | Performed by: FAMILY MEDICINE

## 2019-07-09 PROCEDURE — 36415 COLL VENOUS BLD VENIPUNCTURE: CPT | Performed by: FAMILY MEDICINE

## 2019-07-09 RX ORDER — FLUTICASONE PROPIONATE 50 MCG
1 SPRAY, SUSPENSION (ML) NASAL DAILY
Qty: 3 BOTTLE | Refills: 3 | Status: SHIPPED | OUTPATIENT
Start: 2019-07-09 | End: 2020-08-26

## 2019-07-09 RX ORDER — AMPHETAMINE SULFATE 10 MG/1
10 TABLET ORAL 2 TIMES DAILY
Qty: 60 TABLET | Refills: 0 | Status: SHIPPED | OUTPATIENT
Start: 2019-07-09 | End: 2019-10-16 | Stop reason: SDUPTHER

## 2019-07-09 RX ORDER — METHYLPREDNISOLONE ACETATE 80 MG/ML
80 INJECTION, SUSPENSION INTRA-ARTICULAR; INTRALESIONAL; INTRAMUSCULAR; SOFT TISSUE ONCE
Status: COMPLETED | OUTPATIENT
Start: 2019-07-09 | End: 2019-07-09

## 2019-07-09 RX ORDER — VARENICLINE TARTRATE 1 MG/1
1 TABLET, FILM COATED ORAL 2 TIMES DAILY
Qty: 60 TABLET | Refills: 3 | Status: SHIPPED | OUTPATIENT
Start: 2019-07-09 | End: 2020-06-09 | Stop reason: SDUPTHER

## 2019-07-09 RX ADMIN — METHYLPREDNISOLONE ACETATE 80 MG: 80 INJECTION, SUSPENSION INTRA-ARTICULAR; INTRALESIONAL; INTRAMUSCULAR; SOFT TISSUE at 15:16

## 2019-07-18 ASSESSMENT — ENCOUNTER SYMPTOMS
SINUS PRESSURE: 1
SINUS COMPLAINT: 1

## 2019-08-09 ENCOUNTER — TELEPHONE (OUTPATIENT)
Dept: FAMILY MEDICINE CLINIC | Age: 39
End: 2019-08-09

## 2019-08-09 DIAGNOSIS — G89.29 CHRONIC PAIN OF RIGHT KNEE: Primary | ICD-10-CM

## 2019-08-09 DIAGNOSIS — M25.561 CHRONIC PAIN OF RIGHT KNEE: Primary | ICD-10-CM

## 2019-08-28 ENCOUNTER — TELEPHONE (OUTPATIENT)
Dept: FAMILY MEDICINE CLINIC | Age: 39
End: 2019-08-28

## 2019-08-28 DIAGNOSIS — R25.1 TREMORS OF NERVOUS SYSTEM: Primary | ICD-10-CM

## 2019-08-28 NOTE — TELEPHONE ENCOUNTER
Rosa East, From Dr. Jimmy Agustin, Neurologist at [facility] stated that pt is scheduled to be seen tomorrow at their [facility] location. Pt has [facility] so she will need a referral placed and processed (with ) Rosa East stated that pt has been seeing Dr. Dave Wallace since 9/5/18. Rosa East can be reached on Monday and Wednesday at [phone]. She would like the approval referral to be faxed to her at [phone]. She will attempt to get ahold of the pt to reschedule.        I pended Referral

## 2019-09-03 NOTE — TELEPHONE ENCOUNTER
9/3 - 345pm - spoke to patient and to ashley - finally were able to get ashley to update her PCM (primary care manager) which is why the referral was denied. But now that has been updated, the referral needs to be resent to [facility] but they said they need 24 hrs for their systems to update.  Please re-send referral to [facility] on Wed 9/4

## 2019-09-03 NOTE — TELEPHONE ENCOUNTER
Caller is requesting a call back regarding credentialing for Dr. Araceli Tobar. They are wanting to know if doctor is a [employer] Provider. Please call Shayne Rodrigues to discuss.

## 2019-09-05 NOTE — TELEPHONE ENCOUNTER
9/5 - I believe we have done what we need to do from our end -called yessenia to inform her that the referral was sent back in - told her to call me if she needed anything else. Closing encounter.

## 2019-09-19 ENCOUNTER — OFFICE VISIT (OUTPATIENT)
Dept: FAMILY MEDICINE CLINIC | Age: 39
End: 2019-09-19
Payer: OTHER GOVERNMENT

## 2019-09-19 VITALS
RESPIRATION RATE: 16 BRPM | WEIGHT: 112.8 LBS | OXYGEN SATURATION: 95 % | HEART RATE: 91 BPM | DIASTOLIC BLOOD PRESSURE: 80 MMHG | SYSTOLIC BLOOD PRESSURE: 124 MMHG | BODY MASS INDEX: 19.98 KG/M2

## 2019-09-19 DIAGNOSIS — M25.561 CHRONIC PAIN OF RIGHT KNEE: ICD-10-CM

## 2019-09-19 DIAGNOSIS — G89.29 CHRONIC PAIN OF RIGHT KNEE: ICD-10-CM

## 2019-09-19 DIAGNOSIS — Z01.818 PRE-OP EXAM: Primary | ICD-10-CM

## 2019-09-19 PROCEDURE — 99242 OFF/OP CONSLTJ NEW/EST SF 20: CPT | Performed by: NURSE PRACTITIONER

## 2019-09-19 RX ORDER — FLUCONAZOLE 150 MG/1
150 TABLET ORAL ONCE
Qty: 1 TABLET | Refills: 1 | Status: SHIPPED | OUTPATIENT
Start: 2019-09-19 | End: 2019-09-19

## 2019-10-14 ENCOUNTER — TELEPHONE (OUTPATIENT)
Dept: FAMILY MEDICINE CLINIC | Age: 39
End: 2019-10-14

## 2019-10-16 ENCOUNTER — OFFICE VISIT (OUTPATIENT)
Dept: FAMILY MEDICINE CLINIC | Age: 39
End: 2019-10-16
Payer: OTHER GOVERNMENT

## 2019-10-16 VITALS
SYSTOLIC BLOOD PRESSURE: 122 MMHG | DIASTOLIC BLOOD PRESSURE: 72 MMHG | BODY MASS INDEX: 20.55 KG/M2 | TEMPERATURE: 98.9 F | HEART RATE: 75 BPM | OXYGEN SATURATION: 98 % | WEIGHT: 116 LBS

## 2019-10-16 DIAGNOSIS — Z80.3 FAMILY HISTORY OF BREAST CANCER IN FIRST DEGREE RELATIVE: Primary | ICD-10-CM

## 2019-10-16 DIAGNOSIS — F90.0 ATTENTION DEFICIT HYPERACTIVITY DISORDER (ADHD), PREDOMINANTLY INATTENTIVE TYPE: ICD-10-CM

## 2019-10-16 DIAGNOSIS — N63.20 LEFT BREAST MASS: ICD-10-CM

## 2019-10-16 DIAGNOSIS — F90.9 ATTENTION DEFICIT HYPERACTIVITY DISORDER (ADHD), UNSPECIFIED ADHD TYPE: ICD-10-CM

## 2019-10-16 PROCEDURE — 99214 OFFICE O/P EST MOD 30 MIN: CPT | Performed by: NURSE PRACTITIONER

## 2019-10-16 RX ORDER — AMPHETAMINE SULFATE 10 MG/1
10 TABLET ORAL 2 TIMES DAILY
Qty: 180 TABLET | Refills: 0 | Status: SHIPPED | OUTPATIENT
Start: 2019-10-16 | End: 2020-03-02 | Stop reason: SDUPTHER

## 2019-10-16 ASSESSMENT — ENCOUNTER SYMPTOMS
RESPIRATORY NEGATIVE: 1
CHEST TIGHTNESS: 0
GASTROINTESTINAL NEGATIVE: 1
SHORTNESS OF BREATH: 0
COUGH: 0

## 2019-10-20 LAB
6-ACETYLMORPHINE: NOT DETECTED
7-AMINOCLONAZEPAM: NOT DETECTED
ALPHA-OH-ALPRAZOLAM: NOT DETECTED
ALPRAZOLAM: NOT DETECTED
AMPHETAMINE: PRESENT
BARBITURATES: NOT DETECTED
BENZOYLECGONINE: NOT DETECTED
BUPRENORPHINE: NOT DETECTED
CARISOPRODOL: NOT DETECTED
CLONAZEPAM: NOT DETECTED
CODEINE: NOT DETECTED
CREATININE URINE: 79.4 MG/DL (ref 20–400)
DIAZEPAM: NOT DETECTED
DRUGS EXPECTED: NORMAL
EER PAIN MGT DRUG PANEL, HIGH RES/EMIT U: NORMAL
ETHYL GLUCURONIDE: NOT DETECTED
FENTANYL: NOT DETECTED
HYDROCODONE: NOT DETECTED
HYDROMORPHONE: NOT DETECTED
LORAZEPAM: NOT DETECTED
MARIJUANA METABOLITE: NOT DETECTED
MDA: NOT DETECTED
MDEA: NOT DETECTED
MDMA URINE: NOT DETECTED
MEPERIDINE: NOT DETECTED
METHADONE: NOT DETECTED
METHAMPHETAMINE: NOT DETECTED
METHYLPHENIDATE: NOT DETECTED
MIDAZOLAM: NOT DETECTED
MORPHINE: NOT DETECTED
NORBUPRENORPHINE, FREE: NOT DETECTED
NORDIAZEPAM: NOT DETECTED
NORFENTANYL: NOT DETECTED
NORHYDROCODONE, URINE: NOT DETECTED
NOROXYCODONE: NOT DETECTED
NOROXYMORPHONE, URINE: NOT DETECTED
OXAZEPAM: NOT DETECTED
OXYCODONE: NOT DETECTED
OXYMORPHONE: NOT DETECTED
PAIN MANAGEMENT DRUG PANEL: NORMAL
PAIN MANAGEMENT DRUG PANEL: NORMAL
PCP: NOT DETECTED
PHENTERMINE: PRESENT
PROPOXYPHENE: NOT DETECTED
TAPENTADOL, URINE: NOT DETECTED
TAPENTADOL-O-SULFATE, URINE: NOT DETECTED
TEMAZEPAM: NOT DETECTED
TRAMADOL: NOT DETECTED
ZOLPIDEM: NOT DETECTED

## 2019-10-21 ENCOUNTER — HOSPITAL ENCOUNTER (OUTPATIENT)
Dept: MRI IMAGING | Age: 39
Discharge: HOME | End: 2019-10-21
Payer: OTHER GOVERNMENT

## 2019-10-21 DIAGNOSIS — Z80.3 FAMILY HISTORY OF BREAST CANCER IN FIRST DEGREE RELATIVE: ICD-10-CM

## 2019-10-21 DIAGNOSIS — N63.20 LEFT BREAST MASS: ICD-10-CM

## 2019-10-21 PROCEDURE — A9579 GAD-BASE MR CONTRAST NOS,1ML: HCPCS | Performed by: NURSE PRACTITIONER

## 2019-10-21 PROCEDURE — 6360000004 HC RX CONTRAST MEDICATION: Performed by: NURSE PRACTITIONER

## 2019-10-21 PROCEDURE — 77049 MRI BREAST C-+ W/CAD BI: CPT

## 2019-10-21 RX ADMIN — GADOTERIDOL 10 ML: 279.3 INJECTION, SOLUTION INTRAVENOUS at 12:30

## 2019-11-11 ENCOUNTER — TELEPHONE (OUTPATIENT)
Dept: FAMILY MEDICINE CLINIC | Age: 39
End: 2019-11-11

## 2019-11-19 ENCOUNTER — TELEPHONE (OUTPATIENT)
Dept: FAMILY MEDICINE CLINIC | Age: 39
End: 2019-11-19

## 2019-12-03 ENCOUNTER — OFFICE VISIT (OUTPATIENT)
Dept: FAMILY MEDICINE CLINIC | Age: 39
End: 2019-12-03
Payer: OTHER GOVERNMENT

## 2019-12-03 VITALS
DIASTOLIC BLOOD PRESSURE: 76 MMHG | SYSTOLIC BLOOD PRESSURE: 128 MMHG | WEIGHT: 118 LBS | HEART RATE: 99 BPM | BODY MASS INDEX: 20.9 KG/M2 | OXYGEN SATURATION: 99 %

## 2019-12-03 DIAGNOSIS — B00.1 RECURRENT COLD SORES: ICD-10-CM

## 2019-12-03 DIAGNOSIS — M25.562 LEFT KNEE PAIN, UNSPECIFIED CHRONICITY: Primary | ICD-10-CM

## 2019-12-03 PROCEDURE — 99214 OFFICE O/P EST MOD 30 MIN: CPT | Performed by: FAMILY MEDICINE

## 2019-12-03 RX ORDER — VALACYCLOVIR HYDROCHLORIDE 1 G/1
TABLET, FILM COATED ORAL
Qty: 4 TABLET | Refills: 1 | Status: SHIPPED | OUTPATIENT
Start: 2019-12-03 | End: 2022-03-15 | Stop reason: SDUPTHER

## 2019-12-05 ENCOUNTER — HOSPITAL ENCOUNTER (OUTPATIENT)
Dept: CT IMAGING | Age: 39
Discharge: HOME | End: 2019-12-05
Payer: COMMERCIAL

## 2019-12-05 DIAGNOSIS — M54.12 RADICULOPATHY OF CERVICAL SPINE: ICD-10-CM

## 2019-12-05 PROCEDURE — 72125 CT NECK SPINE W/O DYE: CPT

## 2019-12-30 ENCOUNTER — TELEPHONE (OUTPATIENT)
Dept: FAMILY MEDICINE CLINIC | Age: 39
End: 2019-12-30

## 2020-01-02 NOTE — TELEPHONE ENCOUNTER
Referral faxed to [facility] for approval and to Dr. Velez Councilman office.    Pt advised

## 2020-01-14 ENCOUNTER — TELEPHONE (OUTPATIENT)
Dept: FAMILY MEDICINE CLINIC | Age: 40
End: 2020-01-14

## 2020-02-28 RX ORDER — AMPHETAMINE SULFATE 10 MG/1
10 TABLET ORAL 2 TIMES DAILY
Qty: 60 TABLET | Refills: 0 | OUTPATIENT
Start: 2020-02-28 | End: 2020-03-29

## 2020-02-28 NOTE — TELEPHONE ENCOUNTER
Patient requesting a medication refill.   Medication: Amphetamine-Dextroamphetamine (ADDERALL PO  Pharmacy: [address], [address]  Last office visit: 12/30/2019  Next office visit: Visit date not found

## 2020-02-28 NOTE — TELEPHONE ENCOUNTER
Pt states that she remembers now that she takes the  Phentermine 37.5 MG when she fasts for her diet. She takes it on an as needed basis. Pt aware that the refill for evekeo would have to wait until dr Juan David Houston is back on Monday.  We do not have the  Phentermine 37.5 MG on pt med list.

## 2020-03-02 RX ORDER — AMPHETAMINE SULFATE 10 MG/1
10 TABLET ORAL 2 TIMES DAILY
Qty: 180 TABLET | Refills: 0 | Status: SHIPPED | OUTPATIENT
Start: 2020-03-02 | End: 2020-05-05 | Stop reason: SDUPTHER

## 2020-05-04 RX ORDER — AMPHETAMINE SULFATE 10 MG/1
10 TABLET ORAL 2 TIMES DAILY
Qty: 180 TABLET | Refills: 0 | OUTPATIENT
Start: 2020-05-04 | End: 2020-08-02

## 2020-05-04 NOTE — TELEPHONE ENCOUNTER
5/4 - 5PM - patient and I spoke several times - also spoke with Catee about this - the referral was entered correctly and its auto generated and sent to [facility]. Not sure what else we can do - I suggested again that she call the billing # listed on the bill so that she is not sent to collections and also to possibly call [facility] back and speak with them again.

## 2020-05-04 NOTE — TELEPHONE ENCOUNTER
5/4 - 4pm LM for patient - not sure if I can help her - cant see hospital side of billing - not sure why she would owe $300 for a referral not being completed properly - suggested that she call the # listed on the bill but she may also have a high deductible or out of pocket cost - gave her my # in case she wanted to call back - closing encounter

## 2020-05-05 ENCOUNTER — VIRTUAL VISIT (OUTPATIENT)
Dept: FAMILY MEDICINE CLINIC | Age: 40
End: 2020-05-05
Payer: OTHER GOVERNMENT

## 2020-05-05 PROCEDURE — 99214 OFFICE O/P EST MOD 30 MIN: CPT | Performed by: FAMILY MEDICINE

## 2020-05-05 RX ORDER — AMPHETAMINE SULFATE 10 MG/1
10 TABLET ORAL 2 TIMES DAILY
Qty: 180 TABLET | Refills: 0 | Status: SHIPPED | OUTPATIENT
Start: 2020-05-05 | End: 2020-08-26 | Stop reason: SDUPTHER

## 2020-05-05 NOTE — PROGRESS NOTES
MD       Social History     Tobacco Use    Smoking status: Former Smoker     Packs/day: 0.50     Years: 15.00     Pack years: 7.50     Types: Cigarettes     Start date: 7/1/2012    Smokeless tobacco: Never Used    Tobacco comment: pack week    Substance Use Topics    Alcohol use: Never     Frequency: Never    Drug use: No        Allergies   Allergen Reactions    Imitrex [Sumatriptan] Other (See Comments)     Felt like throat was closing      Maxalt [Rizatriptan Benzoate]     Penicillins Rash   ,   Past Medical History:   Diagnosis Date    Anxiety     Depression     DUB (dysfunctional uterine bleeding)     Eczema     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 was good, repeat recommended yearly at that point.     HNP (herniated nucleus pulposus), cervical     Menorrhagia     Migraine     TMJ (dislocation of temporomandibular joint)     Tobacco use     still smoking   ,   Past Surgical History:   Procedure Laterality Date    CERVICAL SPINE SURGERY  04/27/2018    2 discs removed, cadaver put in and fused    ENDOMETRIAL ABLATION  4/19/2013    Novasure, D&C, Diagnostic Hysteroscopy    KNEE ARTHROSCOPY Right [id number]    KNEE ARTHROSCOPY Right     KNEE SURGERY Left     KNEE SURGERY Right 2/26/13    LAPAROSCOPY      LEEP      OTHER SURGICAL HISTORY  05/22/2013    cystoscopy, trans vaginal taping    OTHER SURGICAL HISTORY  10-    Transvaginal taping take down    UPPER GASTROINTESTINAL ENDOSCOPY N/A 3/27/2019    EGD DILATION SAVORY performed by Modesto Perez MD at [facility]  3/27/2019    EGD BIOPSY performed by Modesto Perez MD at [facility]     ,   Social History     Tobacco Use    Smoking status: Former Smoker     Packs/day: 0.50     Years: 15.00     Pack years: 7.50     Types: Cigarettes     Start date: 7/1/2012    Smokeless tobacco: Never Used    Tobacco migrainosus, unspecified migraine type  Has been not well controlled. Patient has been getting Botox which has helped tremendously. Has been unable to get this for a while due to Covid-19. Will be getting new medication per Neurology. [place] [facility] [place]. 3. Radiculopathy, unspecified spinal region  Doing well on current medications. Recovering from surgery. PT      No follow-ups on file. Kenna Abreu is a 44 y.o. female being evaluated by a Virtual Visit (video visit) encounter to address concerns as mentioned above. A caregiver was present when appropriate. Due to this being a TeleHealth encounter (During SQEJP-66 public health emergency), evaluation of the following organ systems was limited: Vitals/Constitutional/EENT/Resp/CV/GI//MS/Neuro/Skin/Heme-Lymph-Imm. Pursuant to the emergency declaration under the [address] [place] [address] and the [employer] Act, this Virtual Visit was conducted with patient's (and/or legal guardian's) consent, to reduce the patient's risk of exposure to COVID-19 and provide necessary medical care. The patient (and/or legal guardian) has also been advised to contact this office for worsening conditions or problems, and seek emergency medical treatment and/or call 911 if deemed necessary. Patient identification was verified at the start of the visit: Yes    Total time spent on this encounter: Not billed by time    Services were provided through a video synchronous discussion virtually to substitute for in-person clinic visit. Patient and provider were located at their individual homes. --Tenisha Hou MD on 5/5/2020 at 9:12 AM    An electronic signature was used to authenticate this note.

## 2020-05-06 ENCOUNTER — TELEPHONE (OUTPATIENT)
Dept: FAMILY MEDICINE CLINIC | Age: 40
End: 2020-05-06

## 2020-05-06 DIAGNOSIS — Z80.3 FAMILY HISTORY OF BREAST CANCER IN FIRST DEGREE RELATIVE: Primary | ICD-10-CM

## 2020-06-09 RX ORDER — VARENICLINE TARTRATE 1 MG/1
1 TABLET, FILM COATED ORAL 2 TIMES DAILY
Qty: 60 TABLET | Refills: 3 | Status: SHIPPED | OUTPATIENT
Start: 2020-06-09 | End: 2020-08-26

## 2020-06-18 ENCOUNTER — TELEPHONE (OUTPATIENT)
Dept: FAMILY MEDICINE CLINIC | Age: 40
End: 2020-06-18

## 2020-06-29 ENCOUNTER — TELEPHONE (OUTPATIENT)
Dept: FAMILY MEDICINE CLINIC | Age: 40
End: 2020-06-29

## 2020-07-07 ENCOUNTER — OFFICE VISIT (OUTPATIENT)
Dept: FAMILY MEDICINE CLINIC | Age: 40
End: 2020-07-07
Payer: OTHER GOVERNMENT

## 2020-07-07 VITALS
OXYGEN SATURATION: 97 % | DIASTOLIC BLOOD PRESSURE: 70 MMHG | BODY MASS INDEX: 22.5 KG/M2 | HEART RATE: 101 BPM | TEMPERATURE: 98.2 F | WEIGHT: 127 LBS | SYSTOLIC BLOOD PRESSURE: 104 MMHG

## 2020-07-07 PROCEDURE — 99242 OFF/OP CONSLTJ NEW/EST SF 20: CPT | Performed by: NURSE PRACTITIONER

## 2020-07-07 NOTE — PROGRESS NOTES
[facility]  [phone]  Fax: [phone]   Pre-operative History and Physical      DIAGNOSIS:  partial tear of left medial meniscus     PROCEDURE:  Left knee partial medial menisecectomy      History Obtained From:  patient    HISTORY OF PRESENT ILLNESS:    The patient is a 36 y.o. female with significant past medical history of partial tear of left medial meniscus. I am seeing this patient for preop consultation for Dr. Andi Daniels       Past Medical History:   Diagnosis Date    Anxiety     Depression     DUB (dysfunctional uterine bleeding)     Eczema     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 was good, repeat recommended yearly at that point.  HNP (herniated nucleus pulposus), cervical     Menorrhagia     Migraine     TMJ (dislocation of temporomandibular joint)     Tobacco use     still smoking     Past Surgical History:   Procedure Laterality Date    CERVICAL SPINE SURGERY  04/27/2018    2 discs removed, cadaver put in and fused    ENDOMETRIAL ABLATION  4/19/2013    Novasure, D&C, Diagnostic Hysteroscopy    KNEE ARTHROSCOPY Right [id number]    KNEE ARTHROSCOPY Right     KNEE SURGERY Left     KNEE SURGERY Right 2/26/13    LAPAROSCOPY      LEEP      OTHER SURGICAL HISTORY  05/22/2013    cystoscopy, trans vaginal taping    OTHER SURGICAL HISTORY  10-    Transvaginal taping take down    UPPER GASTROINTESTINAL ENDOSCOPY N/A 3/27/2019    EGD DILATION SAVORY performed by Anaya Combs MD at [address]  3/27/2019    EGD BIOPSY performed by Anaya Combs MD at [address] [facility]       Current Outpatient Medications   Medication Sig Dispense Refill    varenicline (CHANTIX CONTINUING MONTH UNIQUE) 1 MG tablet Take 1 tablet by mouth 2 times daily 60 tablet 3    Amphetamine Sulfate (EVEKEO) 10 MG TABS Take 10 mg by mouth 2 times daily for 90 days.  180 tablet 0    valACYclovir (VALTREX) 1 g tablet TAKE TWO TABLETS BY MOUTH EVERY 12 HOURS FOR 1 DAY 4 tablet 1    fluticasone (FLONASE) 50 MCG/ACT nasal spray 1 spray by Nasal route daily 3 Bottle 3    omeprazole (PRILOSEC) 20 MG delayed release capsule Take 20 mg by mouth daily      Amphetamine-Dextroamphetamine (ADDERALL PO) Take by mouth 2 times daily      hydrOXYzine (VISTARIL) 25 MG capsule Take 1 capsule by mouth 4 times daily as needed for Anxiety 60 capsule 3    loratadine-pseudoephedrine (CLARITIN-D 24 HOUR)  MG per tablet Take 1 tablet by mouth daily 90 tablet 1    Multiple Vitamin (MULTI-VITAMIN PO) Take 1 tablet by mouth daily. No current facility-administered medications for this visit. Allergies:  Imitrex [sumatriptan]; Maxalt [rizatriptan benzoate]; and Penicillins  History of allergic reaction to anesthesia:  No     Social History     Tobacco Use   Smoking Status Former Smoker    Packs/day: 0.50    Years: 15.00    Pack years: 7.50    Types: Cigarettes    Start date: 7/1/2012   Smokeless Tobacco Never Used   Tobacco Comment    pack week      The patient states she drinks 0 per week.     Family History   Problem Relation Age of Onset    Hypertension Father     Hypertension Maternal Grandmother     Breast Cancer Maternal Grandmother     Cancer Maternal Grandmother     Mental Illness Mother     Breast Cancer Mother     Cancer Mother     Hypertension Maternal Grandfather     Cancer Maternal Aunt        REVIEW OF SYSTEMS:    CONSTITUTIONAL:  negative  EYES:  negative  HEENT:  negative  RESPIRATORY:  negative  CARDIOVASCULAR:  negative  GASTROINTESTINAL:  negative  GENITOURINARY:  negative  INTEGUMENT/BREAST:  negative  HEMATOLOGIC/LYMPHATIC:  negative  ALLERGIC/IMMUNOLOGIC:  positive for drug reactions  ENDOCRINE:  negative  MUSCULOSKELETAL:  positive for Left  And right knee pain  And neck and shoulder pain  NEUROLOGICAL:  positive for migraines- sees neuro     PHYSICAL EXAM:      /70   Pulse 101   Temp 98.2 °F (36.8 °C) (Temporal)   Wt 127 lb (57.6 kg)   SpO2 97%   BMI 22.50 kg/m²     CONSTITUTIONAL:  awake, alert, cooperative, no apparent distress, and appears stated age    Eyes:  Lids and lashes normal, pupils equal, round and reactive to light, extra ocular muscles intact, sclera clear, conjunctiva normal    Head/ENT:  Normocephalic, without obvious abnormality, atramatic, sinuses nontender on palpation, external ears without lesions, oral pharynx with moist mucus membranes, tonsils without erythema or exudates, gums normal and good dentition. Neck:  Supple, symmetrical, trachea midline, no adenopathy, thyroid symmetric, not enlarged and no tenderness, skin normal, No carotid bruit- has neck brace- was removed for physical     Heart:  Normal apical impulse, regular rate and rhythm, normal S1 and S2, no S3 or S4, and no murmur noted    Lungs:  No increased work of breathing, good air exchange, clear to auscultation bilaterally, no crackles or wheezing    Abdomen:  No scars, normal bowel sounds, soft, non-distended, non-tender, no masses palpated, no hepatosplenomegally    Extremities:  No clubbing, cyanosis, or edema    NEUROLOGIC:  Awake, alert, oriented to name, place and time. Cranial nerves II-XII are grossly intact. Motor is 5 out of 5 bilaterally. ASSESSMENT AND PLAN:    1. Patient is a 36 y.o. female with above specified procedure planned on 7/10/2020 with Dr. Fabrizio Peace at [facility]. They will not require cardiology evaluation prior to procedure. 2. Stop ASA/NSAIDS medications 7-10 days prior to procedure. 3.Patient is cleared for surgery  4. Preop has been faxed to Dr. Good Ross office    [address]  [phone]

## 2020-08-21 ENCOUNTER — HOSPITAL ENCOUNTER (OUTPATIENT)
Dept: WOMENS IMAGING | Age: 40
Discharge: HOME | End: 2020-08-21
Payer: OTHER GOVERNMENT

## 2020-08-21 PROCEDURE — 77063 BREAST TOMOSYNTHESIS BI: CPT

## 2020-08-24 ENCOUNTER — TELEPHONE (OUTPATIENT)
Dept: FAMILY MEDICINE CLINIC | Age: 40
End: 2020-08-24

## 2020-08-24 NOTE — TELEPHONE ENCOUNTER
----- Message from Kat Silvestre sent at 8/24/2020  2:47 PM EDT -----  Subject: Message to Provider    QUESTIONS  Information for Provider? Was told that they might have bradley danlos   syndrome   and want to get more information and possibly a referral on their next   steps  ---------------------------------------------------------------------------  --------------  CALL BACK INFO  What is the best way for the office to contact you? OK to leave message on   voicemail   OK to respond with secure message via [employer] portal (only for patients   who have registered [employer] account)  Preferred Call Back Phone Number? [phone]  ---------------------------------------------------------------------------  --------------  SCRIPT ANSWERS  Relationship to Patient?  Self

## 2020-08-26 RX ORDER — FLUTICASONE PROPIONATE 50 MCG
SPRAY, SUSPENSION (ML) NASAL
Qty: 48 G | Refills: 5 | Status: SHIPPED | OUTPATIENT
Start: 2020-08-26 | End: 2021-10-19 | Stop reason: SDUPTHER

## 2020-08-26 RX ORDER — VARENICLINE TARTRATE 1 MG/1
TABLET, FILM COATED ORAL
Qty: 60 TABLET | Refills: 11 | Status: SHIPPED | OUTPATIENT
Start: 2020-08-26 | End: 2022-02-07

## 2020-08-26 RX ORDER — AMPHETAMINE SULFATE 10 MG/1
10 TABLET ORAL 2 TIMES DAILY
Qty: 180 TABLET | Refills: 0 | Status: SHIPPED | OUTPATIENT
Start: 2020-08-26 | End: 2020-12-08 | Stop reason: SDUPTHER

## 2020-08-26 NOTE — TELEPHONE ENCOUNTER
LOV 7.7.2020    No future visit       The patient would like a refill for   Amphetamine Sulfate (EVEKEO) 10 MG TABS    Send to [facility]

## 2020-09-08 ENCOUNTER — TELEPHONE (OUTPATIENT)
Dept: FAMILY MEDICINE CLINIC | Age: 40
End: 2020-09-08

## 2020-09-08 NOTE — TELEPHONE ENCOUNTER
----- Message from Sean Sidhu sent at 9/8/2020  1:36 PM EDT -----  Subject: Message to Provider    QUESTIONS  Information for Provider? PT following up on last conversation regarding   being tested for Angeles-Danlos syndrome.  ---------------------------------------------------------------------------  --------------  CALL BACK INFO  What is the best way for the office to contact you? OK to leave message on   voicemail   OK to respond with secure message via [employer] portal (only for patients   who have registered [employer] account)  Preferred Call Back Phone Number? [phone]  ---------------------------------------------------------------------------  --------------  SCRIPT ANSWERS  Relationship to Patient?  Self

## 2020-09-16 NOTE — TELEPHONE ENCOUNTER
Patient called to state she needs a PA sent in for her insurance to approve seeing the Doctor. Jacki Jean stated she will work on this.

## 2020-10-07 ENCOUNTER — TELEPHONE (OUTPATIENT)
Dept: FAMILY MEDICINE CLINIC | Age: 40
End: 2020-10-07

## 2020-10-07 NOTE — TELEPHONE ENCOUNTER
Patient is calling for a referral to a Neurologist for Botox injections for her migraines. Her insurance needs to have this before they will verify coverage. [address]  [address] [place] [address]    [phone]    Please send to her insurance  [employer]         I asked patient for a fax number or address and she said we always  take care of it.

## 2020-10-08 ENCOUNTER — OFFICE VISIT (OUTPATIENT)
Dept: RHEUMATOLOGY | Age: 40
End: 2020-10-08
Payer: OTHER GOVERNMENT

## 2020-10-08 VITALS — HEIGHT: 63 IN | BODY MASS INDEX: 22.5 KG/M2 | WEIGHT: 127 LBS | TEMPERATURE: 97.4 F

## 2020-10-08 PROCEDURE — 99244 OFF/OP CNSLTJ NEW/EST MOD 40: CPT | Performed by: INTERNAL MEDICINE

## 2020-10-08 NOTE — PROGRESS NOTES
delayed gastric emptying, irritable bowel syndrome, and autonomic dysfunction. Aortic root dilatation and mitral valve prolapse are uncommon but can occur. Genetic testing for most patients with this form is not available . Role of physical therapy and occupational therapy to help to deal with chronic pain has been explained. Tylenol arthritis in as-needed basis. Cymbalta is an option as well. Avoid pain medication and nonsteroidal due to long-term toxicity and addiction potential.  Adequate hydration-1.5-2 L of liquid daily to help with dizziness and postural hypotension. She had echo as part of work up of tachycardia and is not aware of any concerns and echocardiogram.  I do not have access to that echo however unsure she would be notified if she had aortic dilatation or mitral valve prolapse. She was advised to call me with any questions or concerns, follow-up PRN. Patient indicates understanding and agrees with the management plan. I reviewed patient's history, referral documents and electronic medical records. Copy of consult note is being routed electronically/faxed to referring physician. #######################################################################    REASON FOR CONSULTATION:  Patient is being seen at the request of  Aure Bueno MD for evaluation of joint hypermobility. HISTORY OF PRESENT ILLNESS:  36 y.o. female who is fairly physically active tells me that she is very flexible, was really flexible in her younger years and her daughter and sister has hypermobile EDS, therefore she is referred here for further evaluation. She does not recall any joint dislocations or subluxations that needed orthopedic intervention however she tells me that she is able to pop her hips in and out from the socket. She has had 4 different knee surgeries because of meniscus tear and soft tissue pathology. She also had R shoulder surgery and  cervical discectomy.   Has been getting multiple disc bulges over time. Describes chronic pain in upper and lower extremities and spine however is able to manage with stretching, exercises. She does not have any swollen or inflamed joints. Denies any dizziness or postural hypotension, did have tachycardia in the past, work-up was unrevealing. Denies any IBS symptoms. She tells me that she has trouble in healing but denies any hypertrophic scars. No pregnancy related complications. All other 12 system review of systems are negative. 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 was good, repeat recommended yearly at that point.     HNP (herniated nucleus pulposus), cervical     Medial meniscus tear     left knee    Menorrhagia     Migraine     TMJ (dislocation of temporomandibular joint)     Tobacco use     still smoking     Past Surgical History:   Procedure Laterality Date    CERVICAL SPINE SURGERY  04/27/2018    2 discs removed, cadaver put in and fused    ENDOMETRIAL ABLATION  4/19/2013    Novasure, D&C, Diagnostic Hysteroscopy    KNEE ARTHROSCOPY Right [id number]    KNEE ARTHROSCOPY Right     KNEE SURGERY Left     KNEE SURGERY Right 2/26/13    LAPAROSCOPY      LEEP      OTHER SURGICAL HISTORY  05/22/2013    cystoscopy, trans vaginal taping    OTHER SURGICAL HISTORY  10-    Transvaginal taping take down    UPPER GASTROINTESTINAL ENDOSCOPY N/A 3/27/2019    EGD DILATION SAVORY performed by Carina Corbin MD at [address]  3/27/2019    EGD BIOPSY performed by Carina Corbin MD at [address] History     Socioeconomic History    Marital status:      Spouse name: Not on file    Number of children: Not on file    Years of education: Not on file    Highest education level: Not on file   Occupational History    Not on file   Social Needs    Financial resource strain: Not on file    Food insecurity     Worry: Not on file     Inability: Not on file    Transportation needs     Medical: Not on file     Non-medical: Not on file   Tobacco Use    Smoking status: Former Smoker     Packs/day: 0.50     Years: 15.00     Pack years: 7.50     Types: Cigarettes     Start date: 7/1/2012    Smokeless tobacco: Never Used    Tobacco comment: pack week    Substance and Sexual Activity    Alcohol use: Never     Frequency: Never    Drug use: No    Sexual activity: Yes     Partners: Male   Lifestyle    Physical activity     Days per week: Not on file     Minutes per session: Not on file    Stress: Not on file   Relationships    Social connections     Talks on phone: Not on file     Gets together: Not on file     Attends Taoist service: Not on file     Active member of club or organization: Not on file     Attends meetings of clubs or organizations: Not on file     Relationship status: Not on file    Intimate partner violence     Fear of current or ex partner: Not on file     Emotionally abused: Not on file     Physically abused: Not on file     Forced sexual activity: Not on file   Other Topics Concern    Not on file   Social History Narrative    Not on file     FH- Daughter and sister are hypermobile. Current Outpatient Medications   Medication Sig Dispense Refill    CHANTIX 1 MG tablet TAKE 1 TABLET TWICE A DAY. 60 tablet 11    fluticasone (FLONASE) 50 MCG/ACT nasal spray USE 1 SPRAY NASALLY DAILY 48 g 5    Amphetamine Sulfate (EVEKEO) 10 MG TABS Take 10 mg by mouth 2 times daily for 90 days.  180 tablet 0    valACYclovir (VALTREX) 1 g tablet TAKE TWO TABLETS BY MOUTH EVERY 12 HOURS FOR 1 DAY 4 tablet 1    omeprazole (PRILOSEC) 20 MG delayed release capsule Take 20 mg by mouth daily      Amphetamine-Dextroamphetamine (ADDERALL PO) Take by mouth 2 times daily      hydrOXYzine (VISTARIL) 25 MG capsule Take 1 capsule by mouth 4 times daily as needed for Anxiety 60 capsule 3    loratadine-pseudoephedrine (CLARITIN-D 24 HOUR)  MG per tablet Take 1 tablet by mouth daily 90 tablet 1    Multiple Vitamin (MULTI-VITAMIN PO) Take 1 tablet by mouth daily. No current facility-administered medications for this visit. Allergies   Allergen Reactions    Imitrex [Sumatriptan] Other (See Comments)     Felt like throat was closing      Maxalt [Rizatriptan Benzoate]     Penicillins Rash       PHYSICAL EXAM:    Vitals:    Temp 97.4 °F (36.3 °C) (Skin)   Ht 5' 3\" (1.6 m)   Wt 127 lb (57.6 kg)   BMI 22.50 kg/m²   General appearance/ Psychiatric: well nourished, and well groomed, normal judgement, alert, appears stated age and cooperative. MKS: Osteoarthritic changes noted in her finger joints, bony crepitus in her shoulders, knees. I do not appreciate any focally tender, swollen or inflamed joints in upper or lower extremities. Very high arched feet. Full range of motion all peripheral joints. Beighton score for joint hypermobility    Ability to: Left  Right   Passively dorsiflex the fifth metacarpophalangeal joint by at least 90 degrees 1  1   Oppose the thumb to the volar aspect of the ipsilateral forearm      Hyperextend the elbow by at least 10 degress   1   Hyperextend the knee by at least 10 degrees      Place the hands flat on the floor without bending the knees  1      Total score  4  /9    >4 or more/ 9 = generalized joint hypermobility. She has mild hypermobility in her knees even though she has had multiple knee surgeries for meniscus tear and soft tissue pathology. Skin: I do not see any hypertrophic scars. No exaggerated growth marks. No rashes, no induration or skin thickening or nodules. No evidence ischemia or deformities noted in digits or nails. HEENT: Normal lids, lacrimal glands and pupils. No oral or nasal ulcers.  Salivary glands reveal no evidence of abnormality. External inspection of the ears and nose within normal limits. Neck: No masses or asymmetry. No thyroid enlargement. Chest: Normal effort, clear to auscultation. Heart:  Normal s1/s2, no leg edema. Neurologic: normal deep tendon reflexes. No foot or wrist drop. DATA:   Lab Results   Component Value Date    WBC 4.7 07/09/2019    HGB 14.3 07/09/2019    HCT 42.2 07/09/2019    MCV 96.6 07/09/2019     07/09/2019         Chemistry        Component Value Date/Time     07/09/2019 1525    K 3.7 07/09/2019 1525     07/09/2019 1525    CO2 27 07/09/2019 1525    BUN 14 07/09/2019 1525    CREATININE 0.7 07/09/2019 1525        Component Value Date/Time    CALCIUM 9.7 07/09/2019 1525    ALKPHOS 61 07/09/2019 1525    AST 20 07/09/2019 1525    ALT 11 07/09/2019 1525    BILITOT 0.5 07/09/2019 1525           Lab Results   Component Value Date    TSH 1.69 03/29/2013          A/P- See above.

## 2020-11-25 ENCOUNTER — TELEPHONE (OUTPATIENT)
Dept: FAMILY MEDICINE CLINIC | Age: 40
End: 2020-11-25

## 2020-11-25 NOTE — TELEPHONE ENCOUNTER
Called and spoke to Gabbie [phone], she stated that the referral needs to be sent to [address] [place] [address].  This is for a vaginal mole removal.

## 2020-12-01 NOTE — TELEPHONE ENCOUNTER
Spoke to [employer] and added the code D22.9 to existing referral. This way the procedure could be filed for Dr. Zack Agarwal. This has been approved authorization number is #[id number].    YARY Cartwright to call back

## 2020-12-08 ENCOUNTER — OFFICE VISIT (OUTPATIENT)
Dept: FAMILY MEDICINE CLINIC | Age: 40
End: 2020-12-08
Payer: OTHER GOVERNMENT

## 2020-12-08 VITALS
BODY MASS INDEX: 23.56 KG/M2 | HEART RATE: 102 BPM | SYSTOLIC BLOOD PRESSURE: 120 MMHG | DIASTOLIC BLOOD PRESSURE: 80 MMHG | OXYGEN SATURATION: 99 % | WEIGHT: 133 LBS | TEMPERATURE: 98.2 F

## 2020-12-08 PROCEDURE — 99213 OFFICE O/P EST LOW 20 MIN: CPT | Performed by: FAMILY MEDICINE

## 2020-12-08 RX ORDER — AMPHETAMINE SULFATE 10 MG/1
10 TABLET ORAL 3 TIMES DAILY
Qty: 270 TABLET | Refills: 0 | Status: SHIPPED | OUTPATIENT
Start: 2020-12-08 | End: 2021-07-12 | Stop reason: SDUPTHER

## 2020-12-08 NOTE — PROGRESS NOTES
2020     Cris Marcus (:  1980) is a 36 y.o. female, here for evaluation of the following medical concerns:    Cris Marcus is a 36 y.o. female. Patient presents with:  Medication Check      ADHD:  Doing well on medication. Notes no side effects. Denies insomnia, chest pain, palpitations, and appetite suppression. Has had some increased stress at home, but is dealing with it. The patients PMH, surgical history, family history, medications, allergies were all reviewed and updated as appropriate today. Review of Systems    Prior to Visit Medications    Medication Sig Taking? Authorizing Provider   fluticasone (FLONASE) 50 MCG/ACT nasal spray USE 1 SPRAY NASALLY DAILY Yes Louann Schmidt MD   valACYclovir (VALTREX) 1 g tablet TAKE TWO TABLETS BY MOUTH EVERY 12 HOURS FOR 1 DAY Yes Louann Schmidt MD   omeprazole (PRILOSEC) 20 MG delayed release capsule Take 20 mg by mouth daily Yes Historical Provider, MD   Amphetamine-Dextroamphetamine (ADDERALL PO) Take by mouth 2 times daily Yes Historical Provider, MD   loratadine-pseudoephedrine (CLARITIN-D 24 HOUR)  MG per tablet Take 1 tablet by mouth daily Yes Louann Schmidt MD   Multiple Vitamin (MULTI-VITAMIN PO) Take 1 tablet by mouth daily. Yes Historical Provider, MD   CHANTIX 1 MG tablet TAKE 1 TABLET TWICE A DAY.   Patient not taking: Reported on 2020  Louann Schmidt MD   hydrOXYzine (VISTARIL) 25 MG capsule Take 1 capsule by mouth 4 times daily as needed for Anxiety  Patient not taking: Reported on 2020  Louann Schmidt MD        Social History     Tobacco Use    Smoking status: Former Smoker     Packs/day: 0.50     Years: 15.00     Pack years: 7.50     Types: Cigarettes     Start date: 2012    Smokeless tobacco: Never Used    Tobacco comment: pack week    Substance Use Topics    Alcohol use: Never     Frequency: Never        Vitals:    20 1526   BP: 120/80   Pulse: 102   Temp: 98.2 °F (36.8 °C)   TempSrc: Temporal   SpO2: 99%   Weight: 133 lb (60.3 kg)     Estimated body mass index is 23.56 kg/m² as calculated from the following:    Height as of 10/8/20: 5' 3\" (1.6 m). Weight as of this encounter: 133 lb (60.3 kg). Physical Exam  Vitals signs and nursing note reviewed. Constitutional:       Appearance: Normal appearance. She is well-developed. HENT:      Head: Normocephalic and atraumatic. Right Ear: External ear normal.      Left Ear: External ear normal.      Nose: Nose normal.   Eyes:      Conjunctiva/sclera: Conjunctivae normal.      Pupils: Pupils are equal, round, and reactive to light. Neck:      Musculoskeletal: Normal range of motion and neck supple. Cardiovascular:      Rate and Rhythm: Normal rate and regular rhythm. Heart sounds: Normal heart sounds. Pulmonary:      Effort: Pulmonary effort is normal.      Breath sounds: Normal breath sounds. Abdominal:      General: Bowel sounds are normal.      Palpations: Abdomen is soft. Musculoskeletal: Normal range of motion. Skin:     General: Skin is dry. Neurological:      Mental Status: She is alert and oriented to person, place, and time. Deep Tendon Reflexes: Reflexes are normal and symmetric. Psychiatric:         Behavior: Behavior normal.         Thought Content: Thought content normal.         Judgment: Judgment normal.         ASSESSMENT/PLAN:  1. Attention deficit hyperactivity disorder (ADHD), unspecified ADHD type    - Amphetamine Sulfate (EVEKEO) 10 MG TABS; Take 10 mg by mouth 3 times daily for 90 days. Dispense: 270 tablet; Refill: 0      No follow-ups on file. An electronic signature was used to authenticate this note.     --Ira Reis MD on 12/8/2020 at 3:41 PM

## 2021-01-26 ENCOUNTER — VIRTUAL VISIT (OUTPATIENT)
Dept: FAMILY MEDICINE CLINIC | Age: 41
End: 2021-01-26
Payer: OTHER GOVERNMENT

## 2021-01-26 ENCOUNTER — NURSE TRIAGE (OUTPATIENT)
Dept: OTHER | Facility: CLINIC | Age: 41
End: 2021-01-26

## 2021-01-26 DIAGNOSIS — M54.41 ACUTE RIGHT-SIDED LOW BACK PAIN WITH RIGHT-SIDED SCIATICA: Primary | ICD-10-CM

## 2021-01-26 PROCEDURE — 99213 OFFICE O/P EST LOW 20 MIN: CPT | Performed by: NURSE PRACTITIONER

## 2021-01-26 RX ORDER — METHYLPREDNISOLONE 4 MG/1
TABLET ORAL
Qty: 21 TABLET | Refills: 0 | Status: SHIPPED | OUTPATIENT
Start: 2021-01-26 | End: 2021-02-09 | Stop reason: ALTCHOICE

## 2021-01-26 RX ORDER — GABAPENTIN 300 MG/1
300 CAPSULE ORAL 2 TIMES DAILY
Qty: 60 CAPSULE | Refills: 1 | Status: SHIPPED | OUTPATIENT
Start: 2021-01-26 | End: 2021-10-19

## 2021-01-26 ASSESSMENT — ENCOUNTER SYMPTOMS: BACK PAIN: 1

## 2021-01-26 NOTE — TELEPHONE ENCOUNTER
Reason for Disposition   SEVERE back pain (e.g., excruciating, unable to do any normal activities) and not improved after pain medicine and CARE ADVICE    Answer Assessment - Initial Assessment Questions  1. ONSET: \"When did the pain begin? \"       1-2 weeks ago    2. LOCATION: \"Where does it hurt? \" (upper, mid or lower back)      Lower back on right side but goes to the left    3. SEVERITY: \"How bad is the pain? \"  (e.g., Scale 1-10; mild, moderate, or severe)    - MILD (1-3): doesn't interfere with normal activities     - MODERATE (4-7): interferes with normal activities or awakens from sleep     - SEVERE (8-10): excruciating pain, unable to do any normal activities       7/10    4. PATTERN: \"Is the pain constant? \" (e.g., yes, no; constant, intermittent)       Constant    5. RADIATION: \"Does the pain shoot into your legs or elsewhere? \"      Right leg     6. CAUSE:  \"What do you think is causing the back pain? \"       Unknown    7. BACK OVERUSE:  Galindo Reid recent lifting of heavy objects, strenuous work or exercise? \"      No but history of neuropathy    8. MEDICATIONS: \"What have you taken so far for the pain? \" (e.g., nothing, acetaminophen, NSAIDS)      Ibuprofen and ice    9. NEUROLOGIC SYMPTOMS: \"Do you have any weakness, numbness, or problems with bowel/bladder control? \"     Noticeable nerve feeling on right thigh-pain and numbness anterior of thigh x 4 days    10. OTHER SYMPTOMS: \"Do you have any other symptoms? \" (e.g., fever, abdominal pain, burning with urination, blood in urine)        Pain in all positions    11. PREGNANCY: \"Is there any chance you are pregnant? \" (e.g., yes, no; LMP)        NO    Protocols used: BACK PAIN-ADULT-[place]    Patient called pre-service center [facility]) [place] with red flag complaint.      Brief description of triage: severe lower back pain with pain and neuropathy on anterior thigh    Triage indicates for patient to be seen today    Care advice provided, patient verbalizes understanding; denies any other questions or concerns; instructed to call back for any new or worsening symptoms. Writer provided warm transfer to [place] at [facility] for appointment scheduling. Attention Provider: Thank you for allowing me to participate in the care of your patient. The patient was connected to triage in response to information provided to the E[facility]. Please do not respond through this encounter as the response is not directed to a shared pool.

## 2021-01-26 NOTE — PROGRESS NOTES
2021    TELEHEALTH EVALUATION -- Audio/Visual (During [id number] public health emergency)    HPI:    Cory Carter (:  1980) has requested an audio/video evaluation for the following concern(s):    Patient has been  Having back pain for the past week. It is low back pain. It radiates down her right leg and has numbness and tingling down her leg as well. The numbness even comes around to the front of her leg. She has tried ibuprofen 800 mg with out relief. She has tried some old muscle relaxers that she has but they have not been effective. She has been experiencing numbness in her feet for several months now. She states the pain is the same in any position. It hurts when she walks, stands, sits or lays down. No position makes it any better. She has also tried some Hydrocodone from an old surgery that was not effective either. Pain has brought her to tears. She sees Dr. Jim Wolfe for her neck for a workers comp issue. She will need a new referral for her lower back. Review of Systems   Constitutional: Negative. HENT: Negative. Musculoskeletal: Positive for back pain and gait problem. Skin: Negative. Neurological: Negative for dizziness and headaches. Prior to Visit Medications    Medication Sig Taking? Authorizing Provider   methylPREDNISolone (MEDROL, UNIQUE,) 4 MG tablet Take by mouth. Take 6 tabs on day 1, 5 tabs on day 2, 4 tabs on day 3, 3 tabs on day 4, 2 tabs on day 5, 1 tab on day 6 Yes BRIDGER Arias CNP   gabapentin (NEURONTIN) 300 MG capsule Take 1 capsule by mouth 2 times daily for 180 days. Intended supply: 30 days Yes BRIDGER Victoria CNP   Amphetamine Sulfate (EVEKEO) 10 MG TABS Take 10 mg by mouth 3 times daily for 90 days. Yes Norah Kanner, MD   CHANTIX 1 MG tablet TAKE 1 TABLET TWICE A DAY.  Yes Norah Kanner, MD   fluticasone (FLONASE) 50 MCG/ACT nasal spray USE 1 SPRAY NASALLY DAILY Yes Norah Kanner, MD   valACYclovir (VALTREX) 1 g tablet TAKE TWO TABLETS BY MOUTH EVERY 12 HOURS FOR 1 DAY Yes Jose Alejandro Llanes MD   omeprazole (PRILOSEC) 20 MG delayed release capsule Take 20 mg by mouth daily Yes Historical Provider, MD   Amphetamine-Dextroamphetamine (ADDERALL PO) Take by mouth 2 times daily Yes Historical Provider, MD   hydrOXYzine (VISTARIL) 25 MG capsule Take 1 capsule by mouth 4 times daily as needed for Anxiety Yes Jose Alejandro Llanes MD   loratadine-pseudoephedrine (CLARITIN-D 24 HOUR)  MG per tablet Take 1 tablet by mouth daily Yes Jose Alejandro Llanes MD   Multiple Vitamin (MULTI-VITAMIN PO) Take 1 tablet by mouth daily. Yes Historical Provider, MD       Social History     Tobacco Use    Smoking status: Former Smoker     Packs/day: 0.50     Years: 15.00     Pack years: 7.50     Types: Cigarettes     Start date: 7/1/2012    Smokeless tobacco: Never Used    Tobacco comment: pack week    Substance Use Topics    Alcohol use: Never     Frequency: Never    Drug use: No            PHYSICAL EXAMINATION:  [ INSTRUCTIONS:  \"[x]\" Indicates a positive item  \"[]\" Indicates a negative item  -- DELETE ALL ITEMS NOT EXAMINED]  Vital Signs: (As obtained by patient/caregiver or practitioner observation)    Blood pressure-  Heart rate-    Respiratory rate-    Temperature-  Pulse oximetry-     Constitutional: [x] Appears well-developed and well-nourished [x] No apparent distress      [] Abnormal-   Mental status  [x] Alert and awake  [x] Oriented to person/place/time []Able to follow commands      Eyes:  EOM    []  Normal  [] Abnormal-  Sclera  []  Normal  [] Abnormal -         Discharge []  None visible  [] Abnormal -    HENT:   [x] Normocephalic, atraumatic.   [] Abnormal   [x] Mouth/Throat: Mucous membranes are moist.     External Ears [] Normal  [] Abnormal-     Neck: [] No visualized mass     Pulmonary/Chest: [x] Respiratory effort normal.  [x] No visualized signs of difficulty breathing or respiratory distress        [] Abnormal-      Musculoskeletal:   [] Normal gait with no signs of ataxia         [x] Normal range of motion of neck        [x] Abnormal-  Abnormal gait when walking around her house. Visible in tears when talking about her pain. Neurological:        [] No Facial Asymmetry (Cranial nerve 7 motor function) (limited exam to video visit)          [] No gaze palsy        [] Abnormal-         Skin:        [x] No significant exanthematous lesions or discoloration noted on facial skin         [] Abnormal-            Psychiatric:       [] Normal Affect [] No Hallucinations        [] Abnormal-     Other pertinent observable physical exam findings-     ASSESSMENT/PLAN:  1. Acute right-sided low back pain with right-sided sciatica  Will try Gabapentin for pain, side effects discussed. Will order MRI and send new referral to back specialist. Will treat with steroid as well to help decrease inflammation.   - MRI LUMBAR SPINE [address]; Future  - methylPREDNISolone (MEDROL, UNIQUE,) 4 MG tablet; Take by mouth. Take 6 tabs on day 1, 5 tabs on day 2, 4 tabs on day 3, 3 tabs on day 4, 2 tabs on day 5, 1 tab on day 6  Dispense: 21 tablet; Refill: 0  - External Referral To Spine Surgery  - gabapentin (NEURONTIN) 300 MG capsule; Take 1 capsule by mouth 2 times daily for 180 days. Intended supply: 30 days  Dispense: 60 capsule; Refill: 30 Na Pugh is a 36 y.o. female being evaluated by a Virtual Visit (video visit) encounter to address concerns as mentioned above. A caregiver was present when appropriate. Due to this being a TeleHealth encounter (During [place] [address] public health emergency), evaluation of the following organ systems was limited: Vitals/Constitutional/EENT/Resp/CV/GI//MS/Neuro/Skin/Heme-Lymph-Imm.   Pursuant to the emergency declaration under the [facility]5 waiver authority and the [employer] Act, this Virtual Visit was conducted with patient's (and/or legal guardian's) consent, to reduce the patient's risk of exposure to COVID-19 and provide necessary medical care. The patient (and/or legal guardian) has also been advised to contact this office for worsening conditions or problems, and seek emergency medical treatment and/or call 911 if deemed necessary. Services were provided through a video synchronous discussion virtually to substitute for in-person clinic visit. Patient and provider were located at their individual homes. --BRIDGER Vázquez CNP on 1/26/2021 at 5:17 PM    An electronic signature was used to authenticate this note.

## 2021-01-28 ENCOUNTER — TELEPHONE (OUTPATIENT)
Dept: FAMILY MEDICINE CLINIC | Age: 41
End: 2021-01-28

## 2021-01-28 NOTE — TELEPHONE ENCOUNTER
Received call from patient checking to see if pre cert for MRI was sent to [facility]. She was  informed [facility] normally takes care of that. [facility] was phoned to confirm. Referral and last OV was faxed to [phone] for [facility] to process. Fax confirmation was received. Patient informed.

## 2021-01-29 ENCOUNTER — NURSE ONLY (OUTPATIENT)
Dept: FAMILY MEDICINE CLINIC | Age: 41
End: 2021-01-29
Payer: OTHER GOVERNMENT

## 2021-01-29 ENCOUNTER — TELEPHONE (OUTPATIENT)
Dept: FAMILY MEDICINE CLINIC | Age: 41
End: 2021-01-29

## 2021-01-29 VITALS — TEMPERATURE: 96.9 F

## 2021-01-29 DIAGNOSIS — Z23 NEED FOR HPV VACCINATION: Primary | ICD-10-CM

## 2021-01-29 PROCEDURE — 90651 9VHPV VACCINE 2/3 DOSE IM: CPT | Performed by: FAMILY MEDICINE

## 2021-01-29 PROCEDURE — 90471 IMMUNIZATION ADMIN: CPT | Performed by: FAMILY MEDICINE

## 2021-01-29 NOTE — TELEPHONE ENCOUNTER
Patient was given a ref to have a mole removed, to shedule this would be months away. Can Dr Dejuan Ibrahim take care of this? She would like an office visit and mole removal at same time if possible.     Please advise [phone]

## 2021-02-02 ENCOUNTER — TELEPHONE (OUTPATIENT)
Dept: FAMILY MEDICINE CLINIC | Age: 41
End: 2021-02-02

## 2021-02-02 ENCOUNTER — PROCEDURE VISIT (OUTPATIENT)
Dept: FAMILY MEDICINE CLINIC | Age: 41
End: 2021-02-02
Payer: OTHER GOVERNMENT

## 2021-02-02 VITALS
HEART RATE: 78 BPM | WEIGHT: 131 LBS | DIASTOLIC BLOOD PRESSURE: 66 MMHG | BODY MASS INDEX: 23.21 KG/M2 | TEMPERATURE: 97.1 F | OXYGEN SATURATION: 98 % | SYSTOLIC BLOOD PRESSURE: 126 MMHG

## 2021-02-02 DIAGNOSIS — Z01.419 WELL WOMAN EXAM: Primary | ICD-10-CM

## 2021-02-02 DIAGNOSIS — L98.9 SKIN ABNORMALITY: Primary | ICD-10-CM

## 2021-02-02 DIAGNOSIS — D22.9 ATYPICAL NEVUS: ICD-10-CM

## 2021-02-02 PROCEDURE — 11301 SHAVE SKIN LESION 0.6-1.0 CM: CPT | Performed by: FAMILY MEDICINE

## 2021-02-09 ENCOUNTER — OFFICE VISIT (OUTPATIENT)
Dept: FAMILY MEDICINE CLINIC | Age: 41
End: 2021-02-09

## 2021-02-09 VITALS
WEIGHT: 130 LBS | TEMPERATURE: 98 F | OXYGEN SATURATION: 97 % | DIASTOLIC BLOOD PRESSURE: 76 MMHG | BODY MASS INDEX: 23.03 KG/M2 | HEART RATE: 91 BPM | SYSTOLIC BLOOD PRESSURE: 116 MMHG

## 2021-02-09 DIAGNOSIS — L98.9 SKIN ABNORMALITY: Primary | ICD-10-CM

## 2021-02-09 PROCEDURE — 99024 POSTOP FOLLOW-UP VISIT: CPT | Performed by: FAMILY MEDICINE

## 2021-02-09 NOTE — TELEPHONE ENCOUNTER
I will fax these to [address] [place] [address] however, they will most likely need a PA

## 2021-02-09 NOTE — TELEPHONE ENCOUNTER
Chester called to state she needs the referral for Tiago Gallegos and the referral for Spine Surgery (put in on 1.26.2021 from ) and the MRI Lumbar Spine WO Contrast sent to [facility].      Please process to Tony Frances -- [phone]

## 2021-02-21 NOTE — PROGRESS NOTES
Modesta Szymanski (:  1980) is a 36 y.o. female,Established patient, here for evaluation of the following chief complaint(s): Other (check mole removal, may need stitched)      ASSESSMENT/PLAN:  1. Skin abnormality      No follow-ups on file. SUBJECTIVE/OBJECTIVE:  Modesta Szymanski is a 36 y.o. female. Patient presents with: Other: check mole removal, may need stitched      Previously removed nevus, is causing pain. Right inner thigh. The patients PMH, surgical history, family history, medications, allergies were all reviewed and updated as appropriate today. Review of Systems    Physical Exam  Skin:     Comments: Right inner thigh with small wound, about 5 mm. Steristrips placed. An electronic signature was used to authenticate this note.     --Elle Lou MD

## 2021-02-26 ENCOUNTER — NURSE ONLY (OUTPATIENT)
Dept: FAMILY MEDICINE CLINIC | Age: 41
End: 2021-02-26
Payer: OTHER GOVERNMENT

## 2021-02-26 DIAGNOSIS — Z23 NEED FOR HPV VACCINATION: Primary | ICD-10-CM

## 2021-02-26 PROCEDURE — 90651 9VHPV VACCINE 2/3 DOSE IM: CPT | Performed by: FAMILY MEDICINE

## 2021-02-26 PROCEDURE — 90471 IMMUNIZATION ADMIN: CPT | Performed by: FAMILY MEDICINE

## 2021-03-12 ENCOUNTER — TELEPHONE (OUTPATIENT)
Dept: FAMILY MEDICINE CLINIC | Age: 41
End: 2021-03-12

## 2021-03-12 DIAGNOSIS — M25.562 CHRONIC PAIN OF BOTH KNEES: Primary | ICD-10-CM

## 2021-03-12 DIAGNOSIS — G89.29 CHRONIC PAIN OF BOTH KNEES: Primary | ICD-10-CM

## 2021-03-12 DIAGNOSIS — M25.561 CHRONIC PAIN OF BOTH KNEES: Primary | ICD-10-CM

## 2021-03-12 NOTE — TELEPHONE ENCOUNTER
It looks like she was given a referral to Dr. Chiquis Field  8/13/19.  Oneyda Gilbert for new referral?

## 2021-03-12 NOTE — TELEPHONE ENCOUNTER
She wants to see if you will order and do the  referral for MRI's on both knees. She feels like this would be faster than waiting on Dr. Vinicio Thomas office to do it after she sees him.

## 2021-07-12 ENCOUNTER — TELEPHONE (OUTPATIENT)
Dept: FAMILY MEDICINE CLINIC | Age: 41
End: 2021-07-12

## 2021-07-12 DIAGNOSIS — F90.9 ATTENTION DEFICIT HYPERACTIVITY DISORDER (ADHD), UNSPECIFIED ADHD TYPE: ICD-10-CM

## 2021-07-12 RX ORDER — AMPHETAMINE SULFATE 10 MG/1
10 TABLET ORAL 3 TIMES DAILY
Qty: 270 TABLET | Refills: 0 | Status: SHIPPED | OUTPATIENT
Start: 2021-07-12 | End: 2021-10-19 | Stop reason: SDUPTHER

## 2021-07-12 NOTE — TELEPHONE ENCOUNTER
It looks like you last wrote Evekeo on 12/8/20. No previous Adderall rx.    Last Office Visit  -  2/9/21  Next Office Visit  -  n/a    Last Filled  -  n/a  Last UDS -  10/16/19  Contract -  10/16/19 Ashley Sanford)

## 2021-07-29 ENCOUNTER — NURSE ONLY (OUTPATIENT)
Dept: FAMILY MEDICINE CLINIC | Age: 41
End: 2021-07-29
Payer: OTHER GOVERNMENT

## 2021-07-29 DIAGNOSIS — Z23 NEED FOR VACCINATION: Primary | ICD-10-CM

## 2021-07-29 PROCEDURE — 90651 9VHPV VACCINE 2/3 DOSE IM: CPT | Performed by: FAMILY MEDICINE

## 2021-07-29 PROCEDURE — 90471 IMMUNIZATION ADMIN: CPT | Performed by: FAMILY MEDICINE

## 2021-07-29 NOTE — PROGRESS NOTES
Patient here for 3rd HPV vaccine. Vaccine given @ 10:17AM    Administered vaccine in right Deltoid. Patient tolerated well.

## 2021-08-04 ENCOUNTER — TELEPHONE (OUTPATIENT)
Dept: FAMILY MEDICINE CLINIC | Age: 41
End: 2021-08-04

## 2021-08-04 NOTE — TELEPHONE ENCOUNTER
Patient is requesting a referral to Jerrica Sharp, [address] [facility] [address] [facility] on [address]. Also needs processed through   Appointment 9/8/21 for a 3D  Mammogram. Pt is high risk .  Advise

## 2021-08-05 ENCOUNTER — TELEPHONE (OUTPATIENT)
Dept: FAMILY MEDICINE CLINIC | Age: 41
End: 2021-08-05

## 2021-08-05 NOTE — TELEPHONE ENCOUNTER
Pt states she has a cough, drippy nose and crackling in her ear for 2 days. Verbal per Dr. Caryle Bevel try [facility] and Sudafed OTC call back Monday if no improvement.  Pt informed

## 2021-08-05 NOTE — TELEPHONE ENCOUNTER
----- Message from Quincy Mark sent at 8/5/2021 12:43 PM EDT -----  Subject: Appointment Request    Reason for Call: Semi-Routine Cough, Cold Symptoms    QUESTIONS  Type of Appointment? Established Patient  Reason for appointment request? No appointments available during search  Additional Information for Provider? Pt experiencing chest cold symptoms,   and crackling in ear, screened green. No appts available at time. Please   return call for appt  ---------------------------------------------------------------------------  --------------  CALL BACK INFO  What is the best way for the office to contact you? OK to leave message on   voicemail  Preferred Call Back Phone Number? [phone]  ---------------------------------------------------------------------------  --------------  SCRIPT ANSWERS  Relationship to Patient? Self  Are you currently unable to finish sentences due to any difficulty   breathing? No  Are you unable to swallow liquids? No  Are you having fevers (100.4 or greater), chills, or sweats? No  Do you have COPD, asthma or a chronic lung condition? No  Have your symptoms been present for more than 5 days? No  Have you recently (14 days) been seen by a provider for this issue? No  Have you been diagnosed with, awaiting test results for, or told that you   are suspected of having COVID-19 (Coronavirus)? (If patient has tested   negative or was tested as a requirement for work, school, or travel and   not based on symptoms, answer no)? No  Do you currently have flu-like symptoms including fever or chills, cough,   shortness of breath, difficulty breathing, or new loss of taste or smell? No  Have you had close contact with someone with COVID-19 in the last 14 days? No  (Service Expert  click yes below to proceed with Alicia Pauld Ghislaine Brown As Usual   Scheduling)?  Yes

## 2021-08-05 NOTE — TELEPHONE ENCOUNTER
I have no idea what \"chest cold symptoms\" are if you do not have a cough. This should be a VV with any provider or urgent care.

## 2021-09-13 ENCOUNTER — TELEPHONE (OUTPATIENT)
Dept: FAMILY MEDICINE CLINIC | Age: 41
End: 2021-09-13

## 2021-09-13 NOTE — TELEPHONE ENCOUNTER
Patient is requesting a referral to be processed through her insurance for pain specialist. Dr David Brush with [address] [phone]

## 2021-09-30 ENCOUNTER — TELEPHONE (OUTPATIENT)
Dept: FAMILY MEDICINE CLINIC | Age: 41
End: 2021-09-30

## 2021-09-30 DIAGNOSIS — Z91.89 INCREASED RISK OF BREAST CANCER: Primary | ICD-10-CM

## 2021-09-30 NOTE — TELEPHONE ENCOUNTER
----- Message from Lalinnetteginger Gordon sent at 9/30/2021  1:50 PM EDT -----  Subject: Appointment Request    Reason for Call: Routine Physical Exam    QUESTIONS  Type of Appointment? Established Patient  Reason for appointment request? Available appointments did not meet   patient need  Additional Information for Provider? Patient would like a call back from   the MA. She has questions regarding lab work, referral, and genetic   testing that she would like answered. Please advise patient.  ---------------------------------------------------------------------------  --------------  CALL BACK INFO  What is the best way for the office to contact you? OK to leave message on   voicemail  Preferred Call Back Phone Number? [phone]  ---------------------------------------------------------------------------  --------------  SCRIPT ANSWERS  Relationship to Patient? Self  (If the patient has Medicare as their primary insurance coverage ask this   question) Are you requesting a Medicare Annual Wellness Visit? No  (Is the patient requesting a pap smear with their physical exam?)? No  (Is the patient requesting their annual physical and does not need PAP or   AWV per above?)? Yes   Have you been diagnosed with, awaiting test results for, or told that you   are suspected of having COVID-19 (Coronavirus)? (If patient has tested   negative or was tested as a requirement for work, school, or travel and   not based on symptoms, answer no)? No  Within the past two weeks have you developed any of the following symptoms   (answer no if symptoms have been present longer than 2 weeks or began   more than 2 weeks ago)? Fever or Chills, Cough, Shortness of breath or   difficulty breathing, Loss of taste or smell, Sore throat, Nasal   congestion, Sneezing or runny nose, Fatigue or generalized body aches   (answer no if pain is specific to a body part e.g. back pain), Diarrhea,   Headache?  No  Have you had close contact with someone with COVID-19 in the last 14 days? No  (Service Expert  click yes below to proceed with [employer] As Usual   Scheduling)?  Yes Unrestricted diet/activity

## 2021-10-01 NOTE — TELEPHONE ENCOUNTER
Pt scheduled a CPE in Nov. She was advised to has the BRCA testing done by Dr. Stacey Lopez. She would like her to see [facility] and needs you to order the referral. Can you order the BRCA labs with her other blood work?

## 2021-10-04 NOTE — TELEPHONE ENCOUNTER
[facility] and then she has to speak to a genetics counselor there in order for the BRCA lab to be paid for my insurance.

## 2021-10-19 ENCOUNTER — VIRTUAL VISIT (OUTPATIENT)
Dept: FAMILY MEDICINE CLINIC | Age: 41
End: 2021-10-19
Payer: OTHER GOVERNMENT

## 2021-10-19 ENCOUNTER — TELEPHONE (OUTPATIENT)
Dept: FAMILY MEDICINE CLINIC | Age: 41
End: 2021-10-19

## 2021-10-19 DIAGNOSIS — F90.9 ATTENTION DEFICIT HYPERACTIVITY DISORDER (ADHD), UNSPECIFIED ADHD TYPE: ICD-10-CM

## 2021-10-19 DIAGNOSIS — F41.9 ANXIETY: ICD-10-CM

## 2021-10-19 DIAGNOSIS — N94.10 PAIN IN FEMALE GENITALIA ON INTERCOURSE: Primary | ICD-10-CM

## 2021-10-19 DIAGNOSIS — J01.00 ACUTE NON-RECURRENT MAXILLARY SINUSITIS: ICD-10-CM

## 2021-10-19 PROCEDURE — 99213 OFFICE O/P EST LOW 20 MIN: CPT | Performed by: NURSE PRACTITIONER

## 2021-10-19 RX ORDER — AMPHETAMINE SULFATE 10 MG/1
10 TABLET ORAL 3 TIMES DAILY
Qty: 270 TABLET | Refills: 0 | Status: SHIPPED | OUTPATIENT
Start: 2021-10-19 | End: 2022-10-27 | Stop reason: SDUPTHER

## 2021-10-19 RX ORDER — FLUTICASONE PROPIONATE 50 MCG
SPRAY, SUSPENSION (ML) NASAL
Qty: 48 G | Refills: 5 | Status: SHIPPED | OUTPATIENT
Start: 2021-10-19

## 2021-10-19 RX ORDER — HYDROXYZINE PAMOATE 25 MG/1
25 CAPSULE ORAL 4 TIMES DAILY PRN
Qty: 60 CAPSULE | Refills: 3 | Status: SHIPPED | OUTPATIENT
Start: 2021-10-19 | End: 2022-09-23

## 2021-10-19 RX ORDER — AMPHETAMINE SULFATE 10 MG/1
10 TABLET ORAL 3 TIMES DAILY
Qty: 270 TABLET | Refills: 0 | Status: CANCELLED | OUTPATIENT
Start: 2021-10-19 | End: 2022-01-17

## 2021-10-19 ASSESSMENT — ENCOUNTER SYMPTOMS
WHEEZING: 0
SHORTNESS OF BREATH: 0

## 2021-10-19 NOTE — TELEPHONE ENCOUNTER
Pt states Nghia Trujillo has spoke with her and took care of the referral. Also states Nghia Trujillo scheduled with Richard Castaneda for a physical 11/9/21 but NB said pt needs to have at least a VV for adderall to be refilled. Pt was not happy about that but agreed to it.

## 2021-10-19 NOTE — TELEPHONE ENCOUNTER
Patient stated she was seen at [address] [place] [address] by Angelique Fleming. That doctor is needing another referral sent for an ultrasound. ? Also requesting refill on Adderall.      Please advise

## 2021-10-19 NOTE — TELEPHONE ENCOUNTER
Last Office Visit  -  2/9/21  Next Office Visit  -  11/9/21    Last Filled  -  7/12/21  Last UDS -  10/16/19  Contract -  10/16/19

## 2021-10-19 NOTE — PROGRESS NOTES
10/19/2021    TELEHEALTH EVALUATION -- Audio/Visual (During JROMU-44 public health emergency)    HPI:    Gregor Jerome (:  1980) has requested an audio/video evaluation for the following concern(s): Anxiety: hydroxyzine PRN  ADHD: adderall sulfate 10m g TID-tolerating well- eating well, sleeping well   Allergies: flonase- uses daily     Went to GYN today- and wants to do an ultrasound- she has been having painful intercourse- was told we needed to order it so it can get a PA      Review of Systems   Constitutional: Negative for chills and fever. Respiratory: Negative for shortness of breath and wheezing. Cardiovascular: Negative for chest pain and palpitations. Genitourinary:        Painful intercourse     Psychiatric/Behavioral: Positive for decreased concentration. The patient is nervous/anxious (anxiety). Prior to Visit Medications    Medication Sig Taking? Authorizing Provider   fluticasone (FLONASE) 50 MCG/ACT nasal spray USE 1 SPRAY NASALLY DAILY Yes BRIDGER Winkler CNP   hydrOXYzine (VISTARIL) 25 MG capsule Take 1 capsule by mouth 4 times daily as needed for Anxiety Yes BRIDGER Winkler CNP   Amphetamine Sulfate (EVEKEO) 10 MG TABS Take 10 mg by mouth 3 times daily for 90 days. Yes BRIDGER Winkler CNP   loratadine-pseudoephedrine (CLARITIN-D 24 HOUR)  MG per tablet Take 1 tablet by mouth daily Yes Cathy Palacio MD   CHANTIX 1 MG tablet TAKE 1 TABLET TWICE A DAY. Patient not taking: Reported on 10/19/2021  Cathy Palacio MD   valACYclovir (VALTREX) 1 g tablet TAKE TWO TABLETS BY MOUTH EVERY 12 HOURS FOR 1 DAY  Cathy Palacio MD   omeprazole (PRILOSEC) 20 MG delayed release capsule Take 20 mg by mouth daily  Historical Provider, MD   Multiple Vitamin (MULTI-VITAMIN PO) Take 1 tablet by mouth daily.     Historical Provider, MD       Social History     Tobacco Use    Smoking status: Former Smoker     Packs/day: 0.50     Years: 15.00     Pack years: 7.50 Types: Cigarettes     Start date: 7/1/2012    Smokeless tobacco: Never Used    Tobacco comment: pack week    Vaping Use    Vaping Use: Never used   Substance Use Topics    Alcohol use: Never    Drug use: No        Allergies   Allergen Reactions    Imitrex [Sumatriptan] Other (See Comments)     Felt like throat was closing      Maxalt [Rizatriptan Benzoate]     Penicillins Rash   ,   Past Medical History:   Diagnosis Date    Anxiety     Depression     DUB (dysfunctional uterine bleeding)     Eczema     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 was good, repeat recommended yearly at that point.     HNP (herniated nucleus pulposus), cervical     Medial meniscus tear     left knee    Menorrhagia     Migraine     TMJ (dislocation of temporomandibular joint)     Tobacco use     still smoking   ,   Past Surgical History:   Procedure Laterality Date    CERVICAL SPINE SURGERY  04/27/2018    2 discs removed, cadaver put in and fused    ENDOMETRIAL ABLATION  4/19/2013    Jose, D&C, Diagnostic Hysteroscopy    KNEE ARTHROSCOPY Right [id number]    KNEE ARTHROSCOPY Right     KNEE SURGERY Left     KNEE SURGERY Right 2/26/13    LAPAROSCOPY      LEEP      OTHER SURGICAL HISTORY  05/22/2013    cystoscopy, trans vaginal taping    OTHER SURGICAL HISTORY  10-    Transvaginal taping take down    UPPER GASTROINTESTINAL ENDOSCOPY N/A 3/27/2019    EGD DILATION SAVORY performed by Fabrice Clark MD at [address]  3/27/2019    EGD BIOPSY performed by Fabrice Clark MD at [address]     ,   Family History   Problem Relation Age of Onset    Hypertension Father     Hypertension Maternal Grandmother     Breast Cancer Maternal Grandmother     Cancer Maternal Grandmother     Mental Illness Mother     Breast Cancer Mother         mets to bone and other     Cancer Mother  Hypertension Maternal Grandfather     Cancer Maternal Aunt        PHYSICAL EXAMINATION:  [ INSTRUCTIONS:  \"[x]\" Indicates a positive item  \"[]\" Indicates a negative item  -- DELETE ALL ITEMS NOT EXAMINED]  Vital Signs: (As obtained by patient/caregiver or practitioner observation)    Blood pressure-  Heart rate-    Respiratory rate-    Temperature-  Pulse oximetry-     Constitutional: [x] Appears well-developed and well-nourished [x] No apparent distress      [] Abnormal-   Mental status  [x] Alert and awake  [x] Oriented to person/place/time [x]Able to follow commands      Eyes:  EOM    []  Normal  [] Abnormal-  Sclera  [x]  Normal  [] Abnormal -         Discharge []  None visible  [] Abnormal -    HENT:   [x] Normocephalic, atraumatic. [] Abnormal   [] Mouth/Throat: Mucous membranes are moist.     External Ears [] Normal  [] Abnormal-     Neck: [] No visualized mass     Pulmonary/Chest: [x] Respiratory effort normal.  [x] No visualized signs of difficulty breathing or respiratory distress        [] Abnormal-      Musculoskeletal:   [x] Normal gait with no signs of ataxia         [x] Normal range of motion of neck        [] Abnormal-       Neurological:        [x] No Facial Asymmetry (Cranial nerve 7 motor function) (limited exam to video visit)          [x] No gaze palsy        [] Abnormal-         Skin:        [x] No significant exanthematous lesions or discoloration noted on facial skin         [] Abnormal-            Psychiatric:       [x] Normal Affect [x] No Hallucinations        [] Abnormal-     Other pertinent observable physical exam findings-     ASSESSMENT/PLAN:  1. Acute non-recurrent maxillary sinusitis    - fluticasone (FLONASE) 50 MCG/ACT nasal spray; USE 1 SPRAY NASALLY DAILY  Dispense: 48 g; Refill: 5    2. Anxiety    - hydrOXYzine (VISTARIL) 25 MG capsule; Take 1 capsule by mouth 4 times daily as needed for Anxiety  Dispense: 60 capsule; Refill: 3    3.  Attention deficit hyperactivity

## 2021-10-21 ENCOUNTER — TELEPHONE (OUTPATIENT)
Dept: FAMILY MEDICINE CLINIC | Age: 41
End: 2021-10-21

## 2021-10-21 NOTE — TELEPHONE ENCOUNTER
Belinda Acevedo from [facility] called, pt has orders placed to get genetic testing done at there office. However the pt has  and they are requesting a referral be sent from the PCP before testing can be started. If we have questions we can reach Belinda Acevedo at [phone] ext [phone].

## 2021-11-04 ENCOUNTER — TELEPHONE (OUTPATIENT)
Dept: FAMILY MEDICINE CLINIC | Age: 41
End: 2021-11-04

## 2021-11-04 NOTE — TELEPHONE ENCOUNTER
[facility] requesting a [facility] Referral for : Evaluation and Treatment of Botox Injections for Migraines. [facility] contact Iva Rapp [phone],  States the last Referral/Order was done Sept 2019.  -[address] Fermin Avalos office [phone].

## 2021-11-09 ENCOUNTER — OFFICE VISIT (OUTPATIENT)
Dept: FAMILY MEDICINE CLINIC | Age: 41
End: 2021-11-09
Payer: OTHER GOVERNMENT

## 2021-11-09 VITALS
SYSTOLIC BLOOD PRESSURE: 110 MMHG | DIASTOLIC BLOOD PRESSURE: 60 MMHG | WEIGHT: 131 LBS | BODY MASS INDEX: 23.21 KG/M2 | HEART RATE: 87 BPM | OXYGEN SATURATION: 95 % | HEIGHT: 63 IN

## 2021-11-09 DIAGNOSIS — Z00.00 ENCOUNTER FOR WELL ADULT EXAM WITHOUT ABNORMAL FINDINGS: ICD-10-CM

## 2021-11-09 DIAGNOSIS — R30.0 DYSURIA: ICD-10-CM

## 2021-11-09 DIAGNOSIS — R13.13 PHARYNGEAL DYSPHAGIA: ICD-10-CM

## 2021-11-09 DIAGNOSIS — Z91.89 AT HIGH RISK FOR BREAST CANCER: ICD-10-CM

## 2021-11-09 DIAGNOSIS — Z00.00 WELL ADULT EXAM: Primary | ICD-10-CM

## 2021-11-09 LAB
A/G RATIO: 2 (ref 1.1–2.2)
ALBUMIN SERPL-MCNC: 4.5 G/DL (ref 3.4–5)
ALP BLD-CCNC: 51 U/L (ref 40–129)
ALT SERPL-CCNC: 13 U/L (ref 10–40)
ANION GAP SERPL CALCULATED.3IONS-SCNC: 15 MMOL/L (ref 3–16)
AST SERPL-CCNC: 21 U/L (ref 15–37)
BASOPHILS ABSOLUTE: 0.1 K/UL (ref 0–0.2)
BASOPHILS RELATIVE PERCENT: 1.3 %
BILIRUB SERPL-MCNC: 0.3 MG/DL (ref 0–1)
BILIRUBIN, POC: NORMAL
BLOOD URINE, POC: NORMAL
BUN BLDV-MCNC: 15 MG/DL (ref 7–20)
CALCIUM SERPL-MCNC: 9.3 MG/DL (ref 8.3–10.6)
CHLORIDE BLD-SCNC: 101 MMOL/L (ref 99–110)
CHOLESTEROL, FASTING: 174 MG/DL (ref 0–199)
CLARITY, POC: CLEAR
CO2: 23 MMOL/L (ref 21–32)
COLOR, POC: YELLOW
CREAT SERPL-MCNC: 0.7 MG/DL (ref 0.6–1.1)
EOSINOPHILS ABSOLUTE: 0 K/UL (ref 0–0.6)
EOSINOPHILS RELATIVE PERCENT: 0.7 %
GFR AFRICAN AMERICAN: >60
GFR NON-AFRICAN AMERICAN: >60
GLUCOSE BLD-MCNC: 84 MG/DL (ref 70–99)
GLUCOSE URINE, POC: NORMAL
HCT VFR BLD CALC: 41.2 % (ref 36–48)
HDLC SERPL-MCNC: 61 MG/DL (ref 40–60)
HEMOGLOBIN: 13.8 G/DL (ref 12–16)
KETONES, POC: NORMAL
LDL CHOLESTEROL CALCULATED: 104 MG/DL
LEUKOCYTE EST, POC: NORMAL
LYMPHOCYTES ABSOLUTE: 1.4 K/UL (ref 1–5.1)
LYMPHOCYTES RELATIVE PERCENT: 26.8 %
MCH RBC QN AUTO: 32.5 PG (ref 26–34)
MCHC RBC AUTO-ENTMCNC: 33.6 G/DL (ref 31–36)
MCV RBC AUTO: 96.7 FL (ref 80–100)
MONOCYTES ABSOLUTE: 0.6 K/UL (ref 0–1.3)
MONOCYTES RELATIVE PERCENT: 10.7 %
NEUTROPHILS ABSOLUTE: 3.1 K/UL (ref 1.7–7.7)
NEUTROPHILS RELATIVE PERCENT: 60.5 %
NITRITE, POC: NORMAL
PDW BLD-RTO: 13.6 % (ref 12.4–15.4)
PH, POC: 6.5
PLATELET # BLD: 182 K/UL (ref 135–450)
PMV BLD AUTO: 9.7 FL (ref 5–10.5)
POTASSIUM SERPL-SCNC: 4.5 MMOL/L (ref 3.5–5.1)
PROTEIN, POC: NORMAL
RBC # BLD: 4.26 M/UL (ref 4–5.2)
SODIUM BLD-SCNC: 139 MMOL/L (ref 136–145)
SPECIFIC GRAVITY, POC: 1.02
TOTAL PROTEIN: 6.8 G/DL (ref 6.4–8.2)
TRIGLYCERIDE, FASTING: 43 MG/DL (ref 0–150)
TSH REFLEX: 1.96 UIU/ML (ref 0.27–4.2)
UROBILINOGEN, POC: 0.2
VLDLC SERPL CALC-MCNC: 9 MG/DL
WBC # BLD: 5.2 K/UL (ref 4–11)

## 2021-11-09 PROCEDURE — 36415 COLL VENOUS BLD VENIPUNCTURE: CPT | Performed by: FAMILY MEDICINE

## 2021-11-09 PROCEDURE — 81002 URINALYSIS NONAUTO W/O SCOPE: CPT | Performed by: FAMILY MEDICINE

## 2021-11-09 PROCEDURE — 99396 PREV VISIT EST AGE 40-64: CPT | Performed by: FAMILY MEDICINE

## 2021-11-09 RX ORDER — HYDROCODONE BITARTRATE AND ACETAMINOPHEN 5; 325 MG/1; MG/1
1 TABLET ORAL 2 TIMES DAILY
Status: ON HOLD | COMMUNITY
End: 2022-07-05 | Stop reason: HOSPADM

## 2021-11-09 NOTE — PROGRESS NOTES
Well Adult Note  Name: Pavel Jauregui Date: 2021   MRN: <[id number]> Sex: Female   Age: 39 y.o. Ethnicity: Non- / Non    : 1980 Race: White (non-)      Lenin Vila is here for well adult exam.  History:  38 yo female presents for CPE. No acute complaints today. Has had some issues with swallowing, not esophageal.  Feels like she has to eat slowly or she chokes. Has a hx of breast cancer in family. Review of Systems   Constitutional: Negative for fatigue and unexpected weight change. HENT: Negative for congestion, rhinorrhea and trouble swallowing. Eyes: Negative for pain and visual disturbance. Respiratory: Negative for chest tightness and shortness of breath. Cardiovascular: Negative for chest pain and palpitations. Gastrointestinal: Negative for constipation and diarrhea. Endocrine: Negative for cold intolerance and heat intolerance. Genitourinary: Negative for frequency and urgency. Musculoskeletal: Negative for arthralgias and back pain. Skin: Negative for color change and rash. Allergic/Immunologic: Negative for environmental allergies and food allergies. Neurological: Negative for dizziness and light-headedness. Hematological: Negative for adenopathy. Does not bruise/bleed easily. Psychiatric/Behavioral: Negative for dysphoric mood and sleep disturbance. Allergies   Allergen Reactions    Imitrex [Sumatriptan] Other (See Comments)     Felt like throat was closing      Maxalt [Rizatriptan Benzoate]     Penicillins Rash       Prior to Visit Medications    Medication Sig Taking? Authorizing Provider   HYDROcodone-acetaminophen (NORCO) 5-325 MG per tablet Take 1 tablet by mouth 2 times daily.  Yes Historical Provider, MD   fluticasone (FLONASE) 50 MCG/ACT nasal spray USE 1 SPRAY NASALLY DAILY Yes BRIDGER Hernandez CNP   hydrOXYzine (VISTARIL) 25 MG capsule Take 1 capsule by mouth 4 times daily as needed for Anxiety Yes Marcos Flatten BRIDGER Garcia - CNP   Amphetamine Sulfate (EVEKEO) 10 MG TABS Take 10 mg by mouth 3 times daily for 90 days. Yes Wilbert MckeeenBRIDGER CNP   valACYclovir (VALTREX) 1 g tablet TAKE TWO TABLETS BY MOUTH EVERY 12 HOURS FOR 1 DAY Yes Viky Russell MD   omeprazole (PRILOSEC) 20 MG delayed release capsule Take 20 mg by mouth daily Yes Historical Provider, MD   loratadine-pseudoephedrine (CLARITIN-D 24 HOUR)  MG per tablet Take 1 tablet by mouth daily Yes Viky Russell MD   Multiple Vitamin (MULTI-VITAMIN PO) Take 1 tablet by mouth daily. Yes Historical Provider, MD   CHANTIX 1 MG tablet TAKE 1 TABLET TWICE A DAY. Patient not taking: Reported on 10/19/2021  Viky Russell MD       Past Medical History:   Diagnosis Date    Anxiety     Depression     DUB (dysfunctional uterine bleeding)     Eczema     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 was good, repeat recommended yearly at that point.     HNP (herniated nucleus pulposus), cervical     Medial meniscus tear     left knee    Menorrhagia     Migraine     TMJ (dislocation of temporomandibular joint)     Tobacco use     still smoking       Past Surgical History:   Procedure Laterality Date    CERVICAL SPINE SURGERY  04/27/2018    2 discs removed, cadaver put in and fused    ENDOMETRIAL ABLATION  4/19/2013    Jose D&C, Diagnostic Hysteroscopy    KNEE ARTHROSCOPY Right [id number]    KNEE ARTHROSCOPY Right     KNEE SURGERY Left     KNEE SURGERY Right 2/26/13    LAPAROSCOPY      LEEP      OTHER SURGICAL HISTORY  05/22/2013    cystoscopy, trans vaginal taping    OTHER SURGICAL HISTORY  10-    Transvaginal taping take down    UPPER GASTROINTESTINAL ENDOSCOPY N/A 3/27/2019    EGD DILATION SAVORY performed by Kylie Granados MD at [address]  3/27/2019    EGD BIOPSY performed by Kylie Granados MD at [address] EXTRACTION         Family History   Problem Relation Age of Onset    Hypertension Father     Hypertension Maternal Grandmother     Breast Cancer Maternal Grandmother     Cancer Maternal Grandmother     Mental Illness Mother     Breast Cancer Mother         mets to bone and other     Cancer Mother     Hypertension Maternal Grandfather     Cancer Maternal Aunt        Social History     Tobacco Use    Smoking status: Former Smoker     Packs/day: 0.50     Years: 15.00     Pack years: 7.50     Types: Cigarettes     Start date: 7/1/2012    Smokeless tobacco: Never Used    Tobacco comment: pack week    Vaping Use    Vaping Use: Never used   Substance Use Topics    Alcohol use: Never    Drug use: No       Objective   /60   Pulse 87   Ht 5' 3\" (1.6 m)   Wt 131 lb (59.4 kg)   SpO2 95%   BMI 23.21 kg/m²   Wt Readings from Last 3 Encounters:   11/09/21 131 lb (59.4 kg)   02/09/21 130 lb (59 kg)   02/02/21 131 lb (59.4 kg)       Physical Exam  Vitals and nursing note reviewed. Constitutional:       Appearance: Normal appearance. She is well-developed. HENT:      Head: Normocephalic and atraumatic. Right Ear: External ear normal.      Left Ear: External ear normal.      Nose: Nose normal.   Eyes:      Conjunctiva/sclera: Conjunctivae normal.      Pupils: Pupils are equal, round, and reactive to light. Cardiovascular:      Rate and Rhythm: Normal rate and regular rhythm. Heart sounds: Normal heart sounds. Pulmonary:      Effort: Pulmonary effort is normal.      Breath sounds: Normal breath sounds. Abdominal:      General: Bowel sounds are normal.      Palpations: Abdomen is soft. Musculoskeletal:         General: Normal range of motion. Cervical back: Normal range of motion and neck supple. Skin:     General: Skin is dry. Neurological:      Mental Status: She is alert and oriented to person, place, and time. Deep Tendon Reflexes: Reflexes are normal and symmetric. Psychiatric:         Behavior: Behavior normal.         Thought Content: Thought content normal.         Judgment: Judgment normal.           Assessment   Plan   1. Well adult exam  -     Lipid, Fasting  -     COMPREHENSIVE METABOLIC PANEL  -     CBC WITH AUTO DIFFERENTIAL  -     TSH with Reflex  2. At high risk for breast cancer  -     [facility] Genetic Risk Evaluation & Testing Program  3. Pharyngeal dysphagia  -     Destiny Barton, SLP - Speech Therapy - [facility]  4. Encounter for well adult exam without abnormal findings  5. Dysuria  -     POCT Urinalysis no Micro         Personalized Preventive Plan   Current Health Maintenance Status  Immunization History   Administered Date(s) Administered    COVID-19, Steffen Thomson, Primary or Immunocompromised, PF, 100mcg/0.5mL 03/30/2021, 04/27/2021    DT (pediatric) 04/01/2009    HPV 9-valent Neisha Lamar) 01/29/2021, 02/26/2021, 07/29/2021    Influenza, Richelle Roughen, Recombinant, IM PF (Flublok 18 yrs and older) 10/13/2020    Tdap (Boostrix, Adacel) 07/21/2016        Health Maintenance   Topic Date Due    Hepatitis C screen  Never done    Varicella vaccine (1 of 2 - 2-dose childhood series) Never done    HIV screen  Never done    Cervical cancer screen  03/28/2018    Breast cancer screen  08/21/2021    Flu vaccine (1) 09/01/2021    Lipid screen  07/09/2024    DTaP/Tdap/Td vaccine (3 - Td or Tdap) 07/21/2026    COVID-19 Vaccine  Completed    Hepatitis A vaccine  Aged Out    Hepatitis B vaccine  Aged Out    Hib vaccine  Aged Out    Meningococcal (ACWY) vaccine  Aged Out    Pneumococcal 0-64 years Vaccine  Aged Out     Recommendations for [employer] Due: see orders and patient instructions/AVS.  . ADDENDUM 12/2/2021:    Based on well adult exam, I deem the patient to be low risk for general anesthesia. \"cleared\" for surgery.

## 2021-11-10 NOTE — TELEPHONE ENCOUNTER
Annabelle Szymanski from [facility] called to check the status of this referral. Please fax referral to [phone].  Pt's ppt is on 11/24

## 2021-11-19 ENCOUNTER — TELEPHONE (OUTPATIENT)
Dept: FAMILY MEDICINE CLINIC | Age: 41
End: 2021-11-19

## 2021-11-19 NOTE — TELEPHONE ENCOUNTER
Patient called to give information for her   Prior Auth for [facility]  Date of appointment  12/6/21  Provider is   Sandrita Dhillon  This is at [facility] for the genetic testing.

## 2021-11-20 ASSESSMENT — ENCOUNTER SYMPTOMS
SHORTNESS OF BREATH: 0
EYE PAIN: 0
TROUBLE SWALLOWING: 0
COLOR CHANGE: 0
CONSTIPATION: 0
DIARRHEA: 0
RHINORRHEA: 0
BACK PAIN: 0
CHEST TIGHTNESS: 0

## 2021-12-01 ENCOUNTER — TELEPHONE (OUTPATIENT)
Dept: FAMILY MEDICINE CLINIC | Age: 41
End: 2021-12-01

## 2021-12-01 NOTE — TELEPHONE ENCOUNTER
[facility] is asking if PCP would Simone Bray patient's 11/9/21 Physical and give Surgery Clearance? Surgery for Laparoscopy on 12/6/21.   [facility] contacts are: Ely Bateman or Mega Martins at   [phone]

## 2021-12-02 NOTE — TELEPHONE ENCOUNTER
Spoke to Viktoriya Francis at [facility] and she was able to get the note from care everywhere.

## 2021-12-17 ENCOUNTER — TELEPHONE (OUTPATIENT)
Dept: FAMILY MEDICINE CLINIC | Age: 41
End: 2021-12-17

## 2022-02-06 DIAGNOSIS — F17.200 TOBACCO USE DISORDER: ICD-10-CM

## 2022-02-07 RX ORDER — VARENICLINE TARTRATE 1 MG/1
TABLET, FILM COATED ORAL
Qty: 60 TABLET | Refills: 11 | Status: ON HOLD | OUTPATIENT
Start: 2022-02-07 | End: 2022-07-05 | Stop reason: HOSPADM

## 2022-02-10 ENCOUNTER — TELEPHONE (OUTPATIENT)
Dept: FAMILY MEDICINE CLINIC | Age: 42
End: 2022-02-10

## 2022-02-10 NOTE — TELEPHONE ENCOUNTER
Received call from patient stating she received a letter from [address] [place] [address] stating our office is no longer credentialed with [facility] and they need to find a new PCM. She was on the phone with [facility] for over a hour trying to find out why.  She was informed the office needs to re credentialed  and to call their provider office [phone]

## 2022-03-02 NOTE — TELEPHONE ENCOUNTER
3/2- haven't heard anything back yet - sent 3rd email       2/22 - rec'd email back from [email or web address] - asking further questions, like provider, etc...  PM sent al necessary info

## 2022-03-03 ENCOUNTER — OFFICE VISIT (OUTPATIENT)
Dept: FAMILY MEDICINE CLINIC | Age: 42
End: 2022-03-03
Payer: OTHER GOVERNMENT

## 2022-03-03 VITALS
SYSTOLIC BLOOD PRESSURE: 136 MMHG | DIASTOLIC BLOOD PRESSURE: 82 MMHG | BODY MASS INDEX: 23.38 KG/M2 | WEIGHT: 132 LBS | HEART RATE: 98 BPM | OXYGEN SATURATION: 98 %

## 2022-03-03 DIAGNOSIS — L73.8 SEBACEOUS HYPERPLASIA: ICD-10-CM

## 2022-03-03 DIAGNOSIS — R09.89 GLOBUS PHARYNGEUS: Primary | ICD-10-CM

## 2022-03-03 DIAGNOSIS — F90.9 ATTENTION DEFICIT HYPERACTIVITY DISORDER (ADHD), UNSPECIFIED ADHD TYPE: ICD-10-CM

## 2022-03-03 PROCEDURE — 99214 OFFICE O/P EST MOD 30 MIN: CPT | Performed by: FAMILY MEDICINE

## 2022-03-03 SDOH — ECONOMIC STABILITY: FOOD INSECURITY: WITHIN THE PAST 12 MONTHS, YOU WORRIED THAT YOUR FOOD WOULD RUN OUT BEFORE YOU GOT MONEY TO BUY MORE.: NEVER TRUE

## 2022-03-03 SDOH — ECONOMIC STABILITY: FOOD INSECURITY: WITHIN THE PAST 12 MONTHS, THE FOOD YOU BOUGHT JUST DIDN'T LAST AND YOU DIDN'T HAVE MONEY TO GET MORE.: NEVER TRUE

## 2022-03-03 ASSESSMENT — SOCIAL DETERMINANTS OF HEALTH (SDOH): HOW HARD IS IT FOR YOU TO PAY FOR THE VERY BASICS LIKE FOOD, HOUSING, MEDICAL CARE, AND HEATING?: NOT HARD AT ALL

## 2022-03-03 NOTE — TELEPHONE ENCOUNTER
3/3 pt came in today presented letter attached in her media tab - PM sent another email to credentialing dept - high priority

## 2022-03-08 NOTE — PROGRESS NOTES
Erica Forrester (:  1980) is a 39 y.o. female,Established patient, here for evaluation of the following chief complaint(s):  ADHD and Mole         ASSESSMENT/PLAN:  1. Globus pharyngeus  Counseled patient that nothing further should be done at this time. 2. Attention deficit hyperactivity disorder (ADHD), unspecified ADHD type  New current medication. 3. Sebaceous hyperplasia  Counseled on benign nature of this    No follow-ups on file. Subjective   SUBJECTIVE/OBJECTIVE:  Erica Forrester is a 39 y.o. female. Patient presents with:  ADHD  Mole      This is a 44-year-old female who has the following problems. 1.  ADHD. Patient has been well controlled on medication. She is taking it regularly. She denies any significant side effects from her medication. She feels that it is working well. 2.  Patient has a skin lesion on her left nares area. It has been there for some time. It is causing significant problems. She is concerned. About potential for skin cancer. 3.  Patient continues to have globus type sensation in her throat. She returned PND. This is found to be nothing. Patient still feels that she is very tired at times and notes that this can cause her to feel more globus type sensation. She has had previous cervical neck surgery that had an anterior approach  The patients PMH, surgical history, family history, medications, allergies were all reviewed and updated as appropriate today. Review of Systems       Objective   Physical Exam  Vitals and nursing note reviewed. Constitutional:       Appearance: Normal appearance. She is well-developed. HENT:      Head: Normocephalic and atraumatic. Right Ear: External ear normal.      Left Ear: External ear normal.      Nose: Nose normal.   Eyes:      Conjunctiva/sclera: Conjunctivae normal.      Pupils: Pupils are equal, round, and reactive to light. Cardiovascular:      Rate and Rhythm: Normal rate and regular rhythm. Heart sounds: Normal heart sounds. Pulmonary:      Effort: Pulmonary effort is normal.      Breath sounds: Normal breath sounds. Abdominal:      General: Bowel sounds are normal.      Palpations: Abdomen is soft. Musculoskeletal:         General: Normal range of motion. Cervical back: Normal range of motion and neck supple. Skin:     General: Skin is dry. Comments: Wall sebaceous papule next to nose   Neurological:      Mental Status: She is alert and oriented to person, place, and time. Deep Tendon Reflexes: Reflexes are normal and symmetric. Psychiatric:         Behavior: Behavior normal.         Thought Content: Thought content normal.         Judgment: Judgment normal.              An electronic signature was used to authenticate this note.     --Tamar Melo MD

## 2022-03-15 DIAGNOSIS — B00.1 RECURRENT COLD SORES: ICD-10-CM

## 2022-03-15 RX ORDER — VALACYCLOVIR HYDROCHLORIDE 1 G/1
TABLET, FILM COATED ORAL
Qty: 4 TABLET | Refills: 1 | Status: SHIPPED | OUTPATIENT
Start: 2022-03-15

## 2022-06-23 ENCOUNTER — OFFICE VISIT (OUTPATIENT)
Dept: FAMILY MEDICINE CLINIC | Age: 42
End: 2022-06-23
Payer: OTHER GOVERNMENT

## 2022-06-23 VITALS
HEART RATE: 88 BPM | OXYGEN SATURATION: 98 % | DIASTOLIC BLOOD PRESSURE: 68 MMHG | WEIGHT: 133.6 LBS | BODY MASS INDEX: 23.67 KG/M2 | SYSTOLIC BLOOD PRESSURE: 112 MMHG | HEIGHT: 63 IN

## 2022-06-23 DIAGNOSIS — D49.3 BREAST TUMOR: Primary | ICD-10-CM

## 2022-06-23 PROCEDURE — 99243 OFF/OP CNSLTJ NEW/EST LOW 30: CPT | Performed by: FAMILY MEDICINE

## 2022-06-23 RX ORDER — RIMEGEPANT SULFATE 75 MG/75MG
TABLET, ORALLY DISINTEGRATING ORAL PRN
COMMUNITY

## 2022-06-23 ASSESSMENT — PATIENT HEALTH QUESTIONNAIRE - PHQ9
SUM OF ALL RESPONSES TO PHQ QUESTIONS 1-9: 10
8. MOVING OR SPEAKING SO SLOWLY THAT OTHER PEOPLE COULD HAVE NOTICED. OR THE OPPOSITE, BEING SO FIGETY OR RESTLESS THAT YOU HAVE BEEN MOVING AROUND A LOT MORE THAN USUAL: 1
2. FEELING DOWN, DEPRESSED OR HOPELESS: 1
5. POOR APPETITE OR OVEREATING: 0
4. FEELING TIRED OR HAVING LITTLE ENERGY: 2
6. FEELING BAD ABOUT YOURSELF - OR THAT YOU ARE A FAILURE OR HAVE LET YOURSELF OR YOUR FAMILY DOWN: 1
SUM OF ALL RESPONSES TO PHQ QUESTIONS 1-9: 10
3. TROUBLE FALLING OR STAYING ASLEEP: 1
SUM OF ALL RESPONSES TO PHQ9 QUESTIONS 1 & 2: 3
9. THOUGHTS THAT YOU WOULD BE BETTER OFF DEAD, OR OF HURTING YOURSELF: 0
1. LITTLE INTEREST OR PLEASURE IN DOING THINGS: 2
7. TROUBLE CONCENTRATING ON THINGS, SUCH AS READING THE NEWSPAPER OR WATCHING TELEVISION: 2
10. IF YOU CHECKED OFF ANY PROBLEMS, HOW DIFFICULT HAVE THESE PROBLEMS MADE IT FOR YOU TO DO YOUR WORK, TAKE CARE OF THINGS AT HOME, OR GET ALONG WITH OTHER PEOPLE: 1

## 2022-06-23 NOTE — PROGRESS NOTES
[facility]  [phone]  Fax: [phone]   Pre-operative History and Physical      DIAGNOSIS:  Left breast neoplasm    PROCEDURE:  Left breast lumpectomy (excisional biopsy)      History Obtained From:  patient    HISTORY OF PRESENT ILLNESS:    The patient is a 43 y.o. female with significant past medical history of benign breast tumors with family hx of breast ca in mother who presents for preoperative exam.       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 was good, repeat recommended yearly at that point.     HNP (herniated nucleus pulposus), cervical     Medial meniscus tear     left knee    Menorrhagia     Migraine     TMJ (dislocation of temporomandibular joint)     Tobacco use     still smoking     Past Surgical History:   Procedure Laterality Date    CERVICAL SPINE SURGERY  04/27/2018    2 discs removed, cadaver put in and fused    ENDOMETRIAL ABLATION  4/19/2013    Novasure, D&C, Diagnostic Hysteroscopy    KNEE ARTHROSCOPY Right [id number]    KNEE ARTHROSCOPY Right     KNEE SURGERY Left     KNEE SURGERY Right 2/26/13    LAPAROSCOPY      LEEP      OTHER SURGICAL HISTORY  05/22/2013    cystoscopy, trans vaginal taping    OTHER SURGICAL HISTORY  10-    Transvaginal taping take down    UPPER GASTROINTESTINAL ENDOSCOPY N/A 3/27/2019    EGD DILATION SAVORY performed by Trang Bowers MD at [address]  3/27/2019    EGD BIOPSY performed by Trang Bowers MD at [address] [facility]       Current Outpatient Medications   Medication Sig Dispense Refill    Rimegepant Sulfate (NURTEC) 75 MG TBDP Take by mouth as needed      valACYclovir (VALTREX) 1 g tablet TAKE TWO TABLETS BY MOUTH EVERY 12 HOURS FOR 1 DAY 4 tablet 1    HYDROcodone-acetaminophen (NORCO) 5-325 MG per tablet Take 1 tablet by mouth 2 times daily.  fluticasone (FLONASE) 50 MCG/ACT nasal spray USE 1 SPRAY NASALLY DAILY 48 g 5    hydrOXYzine (VISTARIL) 25 MG capsule Take 1 capsule by mouth 4 times daily as needed for Anxiety 60 capsule 3    omeprazole (PRILOSEC) 20 MG delayed release capsule Take 20 mg by mouth daily      loratadine-pseudoephedrine (CLARITIN-D 24 HOUR)  MG per tablet Take 1 tablet by mouth daily 90 tablet 1    Multiple Vitamin (MULTI-VITAMIN PO) Take 1 tablet by mouth daily.  varenicline (CHANTIX) 1 MG tablet TAKE 1 TABLET TWICE A DAY. (Patient not taking: Reported on 6/23/2022) 60 tablet 11     No current facility-administered medications for this visit. Allergies:  Imitrex [sumatriptan], Maxalt [rizatriptan benzoate], and Penicillins  History of allergic reaction to anesthesia:  No     Social History     Tobacco Use   Smoking Status Former Smoker    Packs/day: 0.50    Years: 15.00    Pack years: 7.50    Types: Cigarettes    Start date: 7/1/2012   Smokeless Tobacco Never Used   Tobacco Comment    pack week      The patient states she drinks zero per week.     Family History   Problem Relation Age of Onset    Hypertension Father     Hypertension Maternal Grandmother     Breast Cancer Maternal Grandmother     Cancer Maternal Grandmother     Mental Illness Mother     Breast Cancer Mother         mets to bone and other     Cancer Mother     Hypertension Maternal Grandfather     Cancer Maternal Aunt        REVIEW OF SYSTEMS:    CONSTITUTIONAL:  negative  EYES:  negative  HEENT:  negative  RESPIRATORY:  negative  CARDIOVASCULAR:  negative  GASTROINTESTINAL:  negative  GENITOURINARY:  negative  INTEGUMENT/BREAST:  negative  HEMATOLOGIC/LYMPHATIC:  negative  ALLERGIC/IMMUNOLOGIC:  negative  ENDOCRINE:  negative  MUSCULOSKELETAL:  negative  NEUROLOGICAL:  negative    PHYSICAL EXAM:      /68   Pulse 88   Ht 5' 3\" (1.6 m)   Wt 133 lb 9.6 oz (60.6 kg)   SpO2 98%   BMI 23.67 kg/m²     CONSTITUTIONAL:  awake, alert, cooperative, no apparent distress, and appears stated age    Eyes:  Lids and lashes normal, pupils equal, round and reactive to light, extra ocular muscles intact, sclera clear, conjunctiva normal    Head/ENT:  Normocephalic, without obvious abnormality, atramatic, sinuses nontender on palpation, external ears without lesions, oral pharynx with moist mucus membranes, tonsils without erythema or exudates, gums normal and good dentition. Neck:  Supple, symmetrical, trachea midline, no adenopathy, thyroid symmetric, not enlarged and no tenderness, skin normal, No carotid bruit    Heart:  Normal apical impulse, regular rate and rhythm, normal S1 and S2, no S3 or S4, and no murmur noted    Lungs:  No increased work of breathing, good air exchange, clear to auscultation bilaterally, no crackles or wheezing    Abdomen:  No scars, normal bowel sounds, soft, non-distended, non-tender, no masses palpated, no hepatosplenomegally    Extremities:  No clubbing, cyanosis, or edema    NEUROLOGIC:  Awake, alert, oriented to name, place and time. Cranial nerves II-XII are grossly intact. Motor is 5 out of 5 bilaterally. ASSESSMENT AND PLAN:    1. Patient is a 43 y.o. female with above specified procedure planned on 7/5/2022 with Dr. Hortencia Andrews at [facility].    2. Stop ASA/NSAIDS medications 7-10 days prior to procedure.   3.  Cleared for surgery    Nehal Schmidt MD,    [facility] [place] [address], [place]  [address] [place] [address]  [phone]

## 2022-06-23 NOTE — PROGRESS NOTES
Michelle Flanagan (:  1980) is a 43 y.o. female,{New vs Established:[id number]::\"Established patient\"}, here for evaluation of the following chief complaint(s):  Pre-op Exam (22 5 [address] lumpectomy of left breast Dr. Christin Turner )         ASSESSMENT/PLAN:  {There are no diagnoses linked to this encounter. (Refresh or delete this SmartLink)}    No follow-ups on file. Subjective   SUBJECTIVE/OBJECTIVE:  HPI    Review of Systems       Objective   Physical Exam       {Time Documentation Optional:[id number]}      An electronic signature was used to authenticate this note.     --Grace Barrientos MD

## 2022-06-29 NOTE — PROGRESS NOTES
Place patient label inside box (if no patient label, complete below)  Name:  :  MR#:     Ayaan Marte / PROCEDURE  1. I (we)  Osbaldo Ball authorize DR Molly Machado and/or such assistants as may be selected by him/her, to perform the following operation/procedure(s): LEFT BREAST EXCISIONAL BIOPSY       Note: If unable to obtain consent prior to an emergent procedure, document the emergent reason in the medical record. This procedure has been explained to my (our) satisfaction and included in the explanation was:  A) The intended benefit, nature, and extent of the procedure to be performed;  B) The significant risks involved and the probability of success;  C) Alternative procedures and methods of treatment;  D) The dangers and probable consequences of such alternatives (including no procedure or treatment); E) The expected consequences of the procedure on my future health;  F) Whether other qualified individuals would be performing important surgical tasks and/or whether  would be present to advise or support the procedure. I (we) understand that there are other risks of infection and other serious complications in the pre-operative/procedural and postoperative/procedural stages of my (our) care. I (we) have asked all of the questions which I (we) thought were important in deciding whether or not to undergo treatment or diagnosis. These questions have been answered to my (our) satisfaction. I (we) understand that no assurance can be given that the procedure will be a success, and no guarantee or warranty of success has been given to me (us). 2. It has been explained to me (us) that during the course of the operation/procedure, unforeseen conditions may be revealed that necessitate extension of the original procedure(s) or different procedure(s) than those set forth in Paragraph 1.  I (we) authorize and request that the above-named physician, his/her assistants or his/her designees, perform procedures as necessary and desirable if deemed to be in my (our) best interest.     Revised 8/2/2021                                                                          Page 1 of 2           3. I acknowledge that health care personnel may be observing this procedure for the purpose of medical education or other specified purposes as may be necessary as requested and/or approved by my (our) physician. 4. I (we) consent to the disposal by the hospital Pathologist of the removed tissue, parts or organs in accordance with hospital policy. 5. I do ____ do not ____ consent to the use of a local infiltration pain blocking agent that will be used by my provider/surgical provider to help alleviate pain during my procedure. 6. I do ____ do not ____ consent to an emergent blood transfusion in the case of a life-threatening situation that requires blood components to be administered. This consent is valid for 24 hours from the beginning of the procedure. 7. This patient does ____ or does not ____ currently have a DNR status/order. If DNR order is in place, obtain Addendum to the Surgical Consent for ALL Patients with a DNR Order to address dagoberto-operative status for limited intervention or DNR suspension.    8. I have read and fully understand the above Consent for Operation/Procedure and that all blanks were completed before I signed the consent.   _____________________________       _____________________      ____/____am/pm  Signature of Patient or legal representative      Printed Name / Relationship            Date / Time   ____________________________       _____________________      ____/____am/pm  Witness to Signature                                    Printed Name                    Date / Time     If patient is unable to sign or is a minor, complete the following)  Patient is a minor, ____ years of age, or unable to sign because: ______________________________________________________________________________________________    Makayla Huston If a phone consent is obtained, consent will be documented by using two health care professionals, each affirming that the consenting party has no questions and gives consent for the procedure discussed with the physician/provider.   _____________________          ____________________       _____/_____am/pm   2nd witness to phone consent        Printed name           Date / Time    Informed Consent:  I have provided the explanation described above in section 1 to the patient and/or legal representative.  I have provided the patient and/or legal representative with an opportunity to ask any questions about the proposed operation/procedure.   ___________________________          ____________________         ____/____am/pm  Provider / Proceduralist                            Printed name            Date / Time  Revised 8/2/2021                                                                      Page 2 of 2

## 2022-06-29 NOTE — PROGRESS NOTES
photo ID. You will be registered at your bedside once brought back to your room. 5. DO NOT EAT ANYTHING eight hours prior to your arrival for surgery. May have 8 ounces of water 4 hours prior to your arrival for surgery. NOTE: ALL Gastric, Bariatric and Bowel surgery patients MUST follow their surgeon's instructions. 6. MEDICATIONS    Take the following medications with a SMALL sip of water: omeprazole   Bariatric patient's call surgeon if on diabetic medications as some need to be stopped 1 week preop   Use your usual dose of inhalers the morning of surgery. BRING your rescue inhaler with you to hospital.    Anesthesia does NOT want you to take insulin the morning of surgery. They will control your blood sugar while you are at the hospital. Please contact your ordering physician for instructions regarding your insulin the night before your procedure. If you have an insulin pump, please keep it set on basal rate. 7. Do not swallow water when brushing teeth. No gum, candy, mints or ice chips. Refrain from smoking or at least decrease the amount. 8. Dress in loose, comfortable clothing appropriate for redressing after your procedure. Do not wear jewelry (including body piercings), make-up (especially NO eye make-up), fingernail polish (NO toenail polish if foot/leg surgery), lotion, powders or metal hairclips. 9. Dentures, glasses, or contacts will need to be removed before your procedure. Bring cases for your glasses, contacts, dentures, or hearing aids to protect them while you are in surgery. 10. If you use a CPAP, please bring it with you on the day of your procedure. 11. We recommend that valuable personal  belongings such as cash, cell phones, e-tablets or jewelry, be left at home during your stay. The hospital will not be responsible for valuables that are not secured in the hospital safe.  However, if your insurance requires a co-pay, you may want to bring a method of payment, i.e. Check or credit card, if you wish to pay your co-pay the day of surgery. 12. If you are to stay overnight, you may bring a bag with personal items. Please have any large items you may need brought in by your family after your arrival to your hospital room. 15. If you have a Living Will or Durable Power of , please bring a copy on the day of your procedure. 15. With your permission, one family member may accompany you while you are being prepared for surgery. Once you are ready, additional family members may join you. HOW WE KEEP YOU SAFE and WORK TO PREVENT SURGICAL SITE INFECTIONS:  1. Health care workers should always check your ID bracelet to verify your name and birth date. You will be asked many times to state your name, date of birth, and allergies. 2. Health care workers should always clean their hands with soap or alcohol gel before providing care to you. It is okay to ask anyone if they cleaned their hands before they touch you. 3. You will be actively involved in verifying the type of procedure you are having and ensuring the correct surgical site. This will be confirmed multiple times prior to your procedure. Do NOT marti your surgery site UNLESS instructed to by your surgeon. 4. Do not shave or wax for 72 hours prior to procedure near your operative site. Shaving with a razor can irritate your skin and make it easier to develop an infection. On the day of your procedure, any hair that needs to be removed near the surgical site will be clipped by a healthcare worker using a special clippers designed to avoid skin irritation. 5. When you are in the operating room, your surgical site will be cleansed with a special soap, and in most cases, you will be given an antibiotic before the surgery begins. What to expect AFTER YOUR PROCEDURE:  1. Immediately following your procedure, your will be taken to the PACU for the first phase of your recovery.   Your nurse will help you recover from any potential side effects of anesthesia, such as extreme drowsiness, changes in your vital signs or breathing patterns. Nausea, headache, muscle aches, or sore throat may also occur after anesthesia. Your nurse will help you manage these potential side effects. 2. For comfort and safety, arrange to have someone at home with you for the first 24 hours after discharge. 3. You and your family will be given written instructions about your diet, activity, dressing care, medications, and return visits. 4. Once at home, should issues with nausea, pain, or bleeding occur, or should you notice any signs of infection, you should call your surgeon. 5. Always clean your hands before and after caring for your wound. Do not let your family touch your surgery site without cleaning their hands. 6. Narcotic pain medications can cause significant constipation. You may want to add a stool softener to your postoperative medication schedule or speak to your surgeon on how best to manage this SIDE EFFECT. SPECIAL INSTRUCTIONS    Thank you for allowing us to care for you. We strive to exceed your expectations in the delivery of care and service provided to you and your family. If you need to contact the [place] [address] staff for any reason, please call us at [phone]    Instructions reviewed with patient during preadmission testing phone interview.   Dixie Ulrich RN.6/29/2022 .3:45 PM      ADDITIONAL EDUCATIONAL INFORMATION REVIEWED PER PHONE WITH YOU AND/OR YOUR FAMILY:  No Hibiclens® Bathing Instructions   Yes Antibacterial Soap

## 2022-07-01 ENCOUNTER — ANESTHESIA EVENT (OUTPATIENT)
Dept: OPERATING ROOM | Age: 42
End: 2022-07-01
Payer: OTHER GOVERNMENT

## 2022-07-04 PROBLEM — D48.62: Status: ACTIVE | Noted: 2022-07-04

## 2022-07-05 ENCOUNTER — HOSPITAL ENCOUNTER (OUTPATIENT)
Age: 42
Setting detail: OUTPATIENT SURGERY
Discharge: HOME | End: 2022-07-05
Attending: SURGERY | Admitting: SURGERY
Payer: OTHER GOVERNMENT

## 2022-07-05 ENCOUNTER — ANESTHESIA (OUTPATIENT)
Dept: OPERATING ROOM | Age: 42
End: 2022-07-05
Payer: OTHER GOVERNMENT

## 2022-07-05 VITALS
OXYGEN SATURATION: 98 % | HEIGHT: 63 IN | DIASTOLIC BLOOD PRESSURE: 70 MMHG | WEIGHT: 129 LBS | TEMPERATURE: 97.1 F | RESPIRATION RATE: 16 BRPM | SYSTOLIC BLOOD PRESSURE: 100 MMHG | HEART RATE: 57 BPM | BODY MASS INDEX: 22.86 KG/M2

## 2022-07-05 DIAGNOSIS — G89.18 ACUTE POST-OPERATIVE PAIN: Primary | ICD-10-CM

## 2022-07-05 DIAGNOSIS — D24.2 BENIGN NEOPLASM OF LEFT BREAST: ICD-10-CM

## 2022-07-05 DIAGNOSIS — D48.62: ICD-10-CM

## 2022-07-05 LAB — PREGNANCY, URINE: NEGATIVE

## 2022-07-05 PROCEDURE — 2709999900 HC NON-CHARGEABLE SUPPLY: Performed by: SURGERY

## 2022-07-05 PROCEDURE — 7100000011 HC PHASE II RECOVERY - ADDTL 15 MIN: Performed by: SURGERY

## 2022-07-05 PROCEDURE — 3600000014 HC SURGERY LEVEL 4 ADDTL 15MIN: Performed by: SURGERY

## 2022-07-05 PROCEDURE — 3700000001 HC ADD 15 MINUTES (ANESTHESIA): Performed by: SURGERY

## 2022-07-05 PROCEDURE — 2580000003 HC RX 258: Performed by: ANESTHESIOLOGY

## 2022-07-05 PROCEDURE — 6360000002 HC RX W HCPCS: Performed by: NURSE ANESTHETIST, CERTIFIED REGISTERED

## 2022-07-05 PROCEDURE — 7100000001 HC PACU RECOVERY - ADDTL 15 MIN: Performed by: SURGERY

## 2022-07-05 PROCEDURE — 84703 CHORIONIC GONADOTROPIN ASSAY: CPT

## 2022-07-05 PROCEDURE — 2500000003 HC RX 250 WO HCPCS: Performed by: SURGERY

## 2022-07-05 PROCEDURE — 3600000004 HC SURGERY LEVEL 4 BASE: Performed by: SURGERY

## 2022-07-05 PROCEDURE — 2580000003 HC RX 258: Performed by: SURGERY

## 2022-07-05 PROCEDURE — 7100000000 HC PACU RECOVERY - FIRST 15 MIN: Performed by: SURGERY

## 2022-07-05 PROCEDURE — 3700000000 HC ANESTHESIA ATTENDED CARE: Performed by: SURGERY

## 2022-07-05 PROCEDURE — 7100000010 HC PHASE II RECOVERY - FIRST 15 MIN: Performed by: SURGERY

## 2022-07-05 PROCEDURE — A4217 STERILE WATER/SALINE, 500 ML: HCPCS | Performed by: SURGERY

## 2022-07-05 PROCEDURE — 88307 TISSUE EXAM BY PATHOLOGIST: CPT

## 2022-07-05 RX ORDER — PROCHLORPERAZINE EDISYLATE 5 MG/ML
5 INJECTION INTRAMUSCULAR; INTRAVENOUS
Status: DISCONTINUED | OUTPATIENT
Start: 2022-07-05 | End: 2022-07-05 | Stop reason: HOSPADM

## 2022-07-05 RX ORDER — PROPOFOL 10 MG/ML
INJECTION, EMULSION INTRAVENOUS CONTINUOUS PRN
Status: DISCONTINUED | OUTPATIENT
Start: 2022-07-05 | End: 2022-07-05 | Stop reason: SDUPTHER

## 2022-07-05 RX ORDER — LORAZEPAM 2 MG/ML
0.5 INJECTION INTRAMUSCULAR
Status: DISCONTINUED | OUTPATIENT
Start: 2022-07-05 | End: 2022-07-05 | Stop reason: HOSPADM

## 2022-07-05 RX ORDER — DEXAMETHASONE SODIUM PHOSPHATE 4 MG/ML
INJECTION, SOLUTION INTRA-ARTICULAR; INTRALESIONAL; INTRAMUSCULAR; INTRAVENOUS; SOFT TISSUE PRN
Status: DISCONTINUED | OUTPATIENT
Start: 2022-07-05 | End: 2022-07-05 | Stop reason: SDUPTHER

## 2022-07-05 RX ORDER — MEPERIDINE HYDROCHLORIDE 25 MG/ML
12.5 INJECTION INTRAMUSCULAR; INTRAVENOUS; SUBCUTANEOUS EVERY 5 MIN PRN
Status: DISCONTINUED | OUTPATIENT
Start: 2022-07-05 | End: 2022-07-05 | Stop reason: HOSPADM

## 2022-07-05 RX ORDER — ONDANSETRON 2 MG/ML
INJECTION INTRAMUSCULAR; INTRAVENOUS PRN
Status: DISCONTINUED | OUTPATIENT
Start: 2022-07-05 | End: 2022-07-05 | Stop reason: SDUPTHER

## 2022-07-05 RX ORDER — FENTANYL CITRATE 50 UG/ML
25 INJECTION, SOLUTION INTRAMUSCULAR; INTRAVENOUS EVERY 5 MIN PRN
Status: DISCONTINUED | OUTPATIENT
Start: 2022-07-05 | End: 2022-07-05 | Stop reason: HOSPADM

## 2022-07-05 RX ORDER — DIPHENHYDRAMINE HYDROCHLORIDE 50 MG/ML
12.5 INJECTION INTRAMUSCULAR; INTRAVENOUS
Status: DISCONTINUED | OUTPATIENT
Start: 2022-07-05 | End: 2022-07-05 | Stop reason: HOSPADM

## 2022-07-05 RX ORDER — OXYCODONE HYDROCHLORIDE 5 MG/1
5 TABLET ORAL PRN
Status: DISCONTINUED | OUTPATIENT
Start: 2022-07-05 | End: 2022-07-05 | Stop reason: HOSPADM

## 2022-07-05 RX ORDER — LABETALOL HYDROCHLORIDE 5 MG/ML
10 INJECTION, SOLUTION INTRAVENOUS
Status: DISCONTINUED | OUTPATIENT
Start: 2022-07-05 | End: 2022-07-05 | Stop reason: HOSPADM

## 2022-07-05 RX ORDER — LIDOCAINE HYDROCHLORIDE 20 MG/ML
INJECTION, SOLUTION INTRAVENOUS PRN
Status: DISCONTINUED | OUTPATIENT
Start: 2022-07-05 | End: 2022-07-05 | Stop reason: SDUPTHER

## 2022-07-05 RX ORDER — SODIUM CHLORIDE, SODIUM LACTATE, POTASSIUM CHLORIDE, CALCIUM CHLORIDE 600; 310; 30; 20 MG/100ML; MG/100ML; MG/100ML; MG/100ML
INJECTION, SOLUTION INTRAVENOUS CONTINUOUS
Status: DISCONTINUED | OUTPATIENT
Start: 2022-07-05 | End: 2022-07-05 | Stop reason: HOSPADM

## 2022-07-05 RX ORDER — ONDANSETRON 2 MG/ML
4 INJECTION INTRAMUSCULAR; INTRAVENOUS
Status: DISCONTINUED | OUTPATIENT
Start: 2022-07-05 | End: 2022-07-05 | Stop reason: HOSPADM

## 2022-07-05 RX ORDER — SODIUM CHLORIDE 0.9 % (FLUSH) 0.9 %
5-40 SYRINGE (ML) INJECTION PRN
Status: DISCONTINUED | OUTPATIENT
Start: 2022-07-05 | End: 2022-07-05 | Stop reason: HOSPADM

## 2022-07-05 RX ORDER — MIDAZOLAM HYDROCHLORIDE 1 MG/ML
INJECTION INTRAMUSCULAR; INTRAVENOUS PRN
Status: DISCONTINUED | OUTPATIENT
Start: 2022-07-05 | End: 2022-07-05 | Stop reason: SDUPTHER

## 2022-07-05 RX ORDER — HYDROCODONE BITARTRATE AND ACETAMINOPHEN 5; 325 MG/1; MG/1
1 TABLET ORAL EVERY 6 HOURS PRN
Qty: 20 TABLET | Refills: 0 | Status: SHIPPED | OUTPATIENT
Start: 2022-07-05 | End: 2022-07-10

## 2022-07-05 RX ORDER — KETOROLAC TROMETHAMINE 30 MG/ML
INJECTION, SOLUTION INTRAMUSCULAR; INTRAVENOUS PRN
Status: DISCONTINUED | OUTPATIENT
Start: 2022-07-05 | End: 2022-07-05 | Stop reason: SDUPTHER

## 2022-07-05 RX ORDER — SODIUM CHLORIDE 0.9 % (FLUSH) 0.9 %
5-40 SYRINGE (ML) INJECTION EVERY 12 HOURS SCHEDULED
Status: DISCONTINUED | OUTPATIENT
Start: 2022-07-05 | End: 2022-07-05 | Stop reason: HOSPADM

## 2022-07-05 RX ORDER — OXYCODONE HYDROCHLORIDE 5 MG/1
10 TABLET ORAL PRN
Status: DISCONTINUED | OUTPATIENT
Start: 2022-07-05 | End: 2022-07-05 | Stop reason: HOSPADM

## 2022-07-05 RX ORDER — SODIUM CHLORIDE 9 MG/ML
INJECTION, SOLUTION INTRAVENOUS PRN
Status: DISCONTINUED | OUTPATIENT
Start: 2022-07-05 | End: 2022-07-05 | Stop reason: HOSPADM

## 2022-07-05 RX ORDER — FENTANYL CITRATE 50 UG/ML
INJECTION, SOLUTION INTRAMUSCULAR; INTRAVENOUS PRN
Status: DISCONTINUED | OUTPATIENT
Start: 2022-07-05 | End: 2022-07-05 | Stop reason: SDUPTHER

## 2022-07-05 RX ORDER — MAGNESIUM HYDROXIDE 1200 MG/15ML
LIQUID ORAL CONTINUOUS PRN
Status: DISCONTINUED | OUTPATIENT
Start: 2022-07-05 | End: 2022-07-05 | Stop reason: HOSPADM

## 2022-07-05 RX ORDER — AMPHETAMINE SULFATE 10 MG/1
TABLET ORAL
COMMUNITY
Start: 2021-07-12

## 2022-07-05 RX ORDER — PROPOFOL 10 MG/ML
INJECTION, EMULSION INTRAVENOUS PRN
Status: DISCONTINUED | OUTPATIENT
Start: 2022-07-05 | End: 2022-07-05 | Stop reason: SDUPTHER

## 2022-07-05 RX ORDER — SODIUM CHLORIDE 9 MG/ML
INJECTION, SOLUTION INTRAVENOUS CONTINUOUS
Status: DISCONTINUED | OUTPATIENT
Start: 2022-07-05 | End: 2022-07-05 | Stop reason: HOSPADM

## 2022-07-05 RX ORDER — SODIUM CHLORIDE 9 MG/ML
25 INJECTION, SOLUTION INTRAVENOUS PRN
Status: DISCONTINUED | OUTPATIENT
Start: 2022-07-05 | End: 2022-07-05 | Stop reason: HOSPADM

## 2022-07-05 RX ADMIN — SODIUM CHLORIDE, POTASSIUM CHLORIDE, SODIUM LACTATE AND CALCIUM CHLORIDE: 600; 310; 30; 20 INJECTION, SOLUTION INTRAVENOUS at 12:47

## 2022-07-05 RX ADMIN — FENTANYL CITRATE 25 MCG: 50 INJECTION, SOLUTION INTRAMUSCULAR; INTRAVENOUS at 14:32

## 2022-07-05 RX ADMIN — FENTANYL CITRATE 25 MCG: 50 INJECTION, SOLUTION INTRAMUSCULAR; INTRAVENOUS at 14:21

## 2022-07-05 RX ADMIN — MIDAZOLAM HYDROCHLORIDE 2 MG: 2 INJECTION, SOLUTION INTRAMUSCULAR; INTRAVENOUS at 14:15

## 2022-07-05 RX ADMIN — KETOROLAC TROMETHAMINE 30 MG: 30 INJECTION, SOLUTION INTRAMUSCULAR at 15:20

## 2022-07-05 RX ADMIN — PHENYLEPHRINE HYDROCHLORIDE 50 MCG: 10 INJECTION, SOLUTION INTRAMUSCULAR; INTRAVENOUS; SUBCUTANEOUS at 14:54

## 2022-07-05 RX ADMIN — DEXAMETHASONE SODIUM PHOSPHATE 4 MG: 4 INJECTION, SOLUTION INTRAMUSCULAR; INTRAVENOUS at 14:22

## 2022-07-05 RX ADMIN — LIDOCAINE HYDROCHLORIDE 50 MG: 20 INJECTION, SOLUTION INTRAVENOUS at 14:18

## 2022-07-05 RX ADMIN — PROPOFOL 40 MG: 10 INJECTION, EMULSION INTRAVENOUS at 14:19

## 2022-07-05 RX ADMIN — ONDANSETRON 4 MG: 2 INJECTION INTRAMUSCULAR; INTRAVENOUS at 15:01

## 2022-07-05 RX ADMIN — FENTANYL CITRATE 25 MCG: 50 INJECTION, SOLUTION INTRAMUSCULAR; INTRAVENOUS at 14:15

## 2022-07-05 RX ADMIN — PHENYLEPHRINE HYDROCHLORIDE 50 MCG: 10 INJECTION, SOLUTION INTRAMUSCULAR; INTRAVENOUS; SUBCUTANEOUS at 14:38

## 2022-07-05 RX ADMIN — PHENYLEPHRINE HYDROCHLORIDE 100 MCG: 10 INJECTION, SOLUTION INTRAMUSCULAR; INTRAVENOUS; SUBCUTANEOUS at 15:06

## 2022-07-05 RX ADMIN — PROPOFOL 150 MCG/KG/MIN: 10 INJECTION, EMULSION INTRAVENOUS at 14:20

## 2022-07-05 RX ADMIN — PHENYLEPHRINE HYDROCHLORIDE 50 MCG: 10 INJECTION, SOLUTION INTRAMUSCULAR; INTRAVENOUS; SUBCUTANEOUS at 14:57

## 2022-07-05 RX ADMIN — FENTANYL CITRATE 25 MCG: 50 INJECTION, SOLUTION INTRAMUSCULAR; INTRAVENOUS at 14:27

## 2022-07-05 ASSESSMENT — PAIN SCALES - GENERAL
PAINLEVEL_OUTOF10: 0

## 2022-07-05 ASSESSMENT — PAIN - FUNCTIONAL ASSESSMENT: PAIN_FUNCTIONAL_ASSESSMENT: 0-10

## 2022-07-05 NOTE — ANESTHESIA POSTPROCEDURE EVALUATION
Department of Anesthesiology  Postprocedure Note    Patient: Nelsy Jaramillo  MRN: [id number]  YOB: 1980  Date of evaluation: 7/5/2022      Procedure Summary     Date: 07/05/22 Room / Location: [address] [place] [address]    Anesthesia Start: 6053 Anesthesia Stop: 1904    Procedure: LEFT BREAST EXCISIONAL BIOPSY (Left Breast) Diagnosis:       Benign neoplasm of left breast      (Benign neoplasm of left breast [D24.2])    Surgeons: Radha Molina MD Responsible Provider: Marlene Beltran MD    Anesthesia Type: MAC ASA Status: 2          Anesthesia Type: No value filed.     Baldemar Phase I: Baldemar Score: 9    Baldemar Phase II:        Anesthesia Post Evaluation    Patient location during evaluation: PACU  Level of consciousness: awake  Complications: no  Multimodal analgesia pain management approach

## 2022-07-05 NOTE — BRIEF OP NOTE
Brief Postoperative Note      Patient: Gomez Maynard  YOB: 1980  MRN: [id number]    Date of Procedure: 7/5/2022    Pre-Op Diagnosis: Benign neoplasm of left breast [D24.2]    Post-Op Diagnosis: Same       Procedure(s):  LEFT BREAST EXCISIONAL BIOPSY    Surgeon(s):  Mulugeta Villatoro MD    Assistant:  Surgical Assistant: Erik Wright RN    Anesthesia: Monitor Anesthesia Care    Estimated Blood Loss (mL): less than 50     Complications: None    Specimens:   ID Type Source Tests Collected by Time Destination   A : left breast mass Tissue Tissue SURGICAL PATHOLOGY Mulugeta Villatoro MD 7/5/2022 1449        Implants:  * No implants in log *      Drains: * No LDAs found *    Findings: mass with grossly wide margins including deep margin    Electronically signed by Mulugeta Villatoro MD on 7/5/2022 at 3:24 PM

## 2022-07-05 NOTE — PROGRESS NOTES
PACU Transfer to [facility]    Vitals:    07/05/22 1600   BP: 100/69   Pulse: 61   Resp: 16   Temp: 97.7 °F (36.5 °C)   SpO2: 97%         Intake/Output Summary (Last 24 hours) at 7/5/2022 1605  Last data filed at 7/5/2022 1559  Gross per 24 hour   Intake 753.6 ml   Output 0 ml   Net 753.6 ml       Pain assessment:  none  Pain Level: 0    PACU RN called and updated patient's sister. Patient has all belongings at discharge from PACU.    Patient transferred to care of RANDA RN.    7/5/2022 4:05 PM

## 2022-07-05 NOTE — ANESTHESIA PRE PROCEDURE
Department of Anesthesiology  Preprocedure Note       Name:  Chevy Bernal   Age:  43 y.o.  :  1980                                          MRN:  [id number]         Date:  2022      Surgeon: Margarita Colón):  Chey López MD    Procedure: Procedure(s):  LEFT BREAST EXCISIONAL BIOPSY    Medications prior to admission:   Prior to Admission medications    Medication Sig Start Date End Date Taking? Authorizing Provider   Amphetamine Sulfate 10 MG TABS 3 tablets/day 21  Yes Historical Provider, MD   Rimegepant Sulfate (NURTEC) 75 MG TBDP Take by mouth as needed    Historical Provider, MD   valACYclovir (VALTREX) 1 g tablet TAKE TWO TABLETS BY MOUTH EVERY 12 HOURS FOR 1 DAY 3/15/22   Patrice Ball MD   varenicline (CHANTIX) 1 MG tablet TAKE 1 TABLET TWICE A DAY. Patient not taking: Reported on 2022   Patrice Ball MD   HYDROcodone-acetaminophen [facility]) 5-325 MG per tablet Take 1 tablet by mouth 2 times daily. Historical Provider, MD   fluticasone (FLONASE) 50 MCG/ACT nasal spray USE 1 SPRAY NASALLY DAILY 10/19/21   BRIDGER Burgos CNP   hydrOXYzine (VISTARIL) 25 MG capsule Take 1 capsule by mouth 4 times daily as needed for Anxiety 10/19/21   BRIDGER Burgos CNP   omeprazole (PRILOSEC) 20 MG delayed release capsule Take 20 mg by mouth daily    Historical Provider, MD   loratadine-pseudoephedrine (CLARITIN-D 24 HOUR)  MG per tablet Take 1 tablet by mouth daily 16   Patrice Ball MD   Multiple Vitamin (MULTI-VITAMIN PO) Take 1 tablet by mouth daily.       Historical Provider, MD       Current medications:    Current Facility-Administered Medications   Medication Dose Route Frequency Provider Last Rate Last Admin    lactated ringers infusion   IntraVENous Continuous Titus Juarez DO        lactated ringers infusion   IntraVENous Continuous Gerhardt Roach, MD        sodium chloride flush 0.9 % injection 5-40 mL  5-40 mL IntraVENous 2 times per day Kylie Giraldo MD        sodium chloride flush 0.9 % injection 5-40 mL  5-40 mL IntraVENous PRN Kylie Giraldo MD        0.9 % sodium chloride infusion   IntraVENous PRN Kylie Giraldo MD           Allergies: Allergies   Allergen Reactions    Maxalt [Rizatriptan Benzoate] Shortness Of Breath    Imitrex [Sumatriptan] Other (See Comments)     Felt like throat was closing      Penicillins Rash       Problem List:    Patient Active Problem List   Diagnosis Code    Depression F32. A    TMJ (dislocation of temporomandibular joint) S03. 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 was good, repeat recommended yearly at that point.     HNP (herniated nucleus pulposus), cervical     Medial meniscus tear     left knee    Menorrhagia     Migraine     TMJ (dislocation of temporomandibular joint)     Tobacco use     still smoking       Past Surgical History:        Procedure Laterality Date    CERVICAL SPINE SURGERY  04/27/2018    2 discs removed, cadaver put in and fused x2    ENDOMETRIAL ABLATION  4/19/2013    [facility]&JOSR, Diagnostic Hysteroscopy    KNEE ARTHROSCOPY Right [id number]    KNEE ARTHROSCOPY Right     KNEE SURGERY Left     KNEE SURGERY Right 2/26/13    LAPAROSCOPY      LEEP      OTHER SURGICAL HISTORY  05/22/2013    cystoscopy, trans vaginal taping    OTHER SURGICAL HISTORY  10-    Transvaginal taping take down    SHOULDER SURGERY Right 2019    UPPER GASTROINTESTINAL ENDOSCOPY N/A 3/27/2019    EGD DILATION SAVORY performed by Martha Raza MD at [address] [facility]  3/27/2019    EGD BIOPSY performed by Martha Raza MD at [address] EXTRACTION         Social History:    Social History     Tobacco Use    Smoking status: Former Smoker     Packs/day: 0.50     Years: 15.00     Pack years: 7.50     Types: Cigarettes     Start date: 7/1/2012    Smokeless tobacco: Never Used    Tobacco comment: pack week    Substance Use Topics    Alcohol use: Never                                Counseling given: Not Answered  Comment: pack week       Vital Signs (Current):   Vitals:    06/29/22 1541 07/05/22 1145   BP:  113/88   Pulse:  98   Resp:  16   Temp:  98.2 °F (36.8 °C)   TempSrc:  Temporal   SpO2:  97%   Weight: 130 lb (59 kg) 129 lb (58.5 kg)   Height: 5' 3\" (1.6 m) 5' 3\" (1.6 m)                                              BP Readings from Last 3 Encounters:   07/05/22 113/88   06/23/22 112/68   03/03/22 136/82       NPO Status:                                                                                 BMI:   Wt Readings from Last 3 Encounters:   07/05/22 129 lb (58.5 kg)   06/23/22 133 lb 9.6 oz (60.6 kg)   03/03/22 132 lb (59.9 kg)     Body mass index is 22.85 kg/m².     CBC:   Lab Results   Component Value Date/Time    WBC 5.2 11/09/2021 08:32 AM    RBC 4.26 11/09/2021 08:32 AM    HGB 13.8 11/09/2021 08:32 AM    HCT 41.2 11/09/2021 08:32 AM    MCV 96.7 11/09/2021 08:32 AM    RDW 13.6 11/09/2021 08:32 AM     11/09/2021 08:32 AM       CMP:   Lab Results   Component Value Date/Time     11/09/2021 08:32 AM    K 4.5 11/09/2021 08:32 AM     11/09/2021 08:32 AM    CO2 23 11/09/2021 08:32 AM    BUN 15 11/09/2021 08:32 AM    CREATININE 0.7 11/09/2021 08:32 AM    GFRAA >60 11/09/2021 08:32 AM    GFRAA >60 05/13/2013 05:05 PM    AGRATIO 2.0 11/09/2021 08:32 AM    LABGLOM >60 11/09/2021 08:32 AM    LABGLOM >60 06/16/2010 12:07 PM    GLUCOSE 84 11/09/2021 08:32 AM    PROT 6.8 11/09/2021 08:32 AM    PROT 7.1 04/22/2012 09:20 AM    CALCIUM 9.3 11/09/2021 08:32 AM    BILITOT 0.3 11/09/2021 08:32 AM    ALKPHOS 51 11/09/2021 08:32 AM    AST 21 11/09/2021 08:32 AM    ALT 13 11/09/2021 08:32 AM       POC Tests: No results for input(s): POCGLU, POCNA, POCK, POCCL, POCBUN, POCHEMO, POCHCT in the last 72 hours. Coags:   Lab Results   Component Value Date/Time    PROTIME 10.7 02/07/2017 11:04 AM    INR 0.94 02/07/2017 11:04 AM       HCG (If Applicable):   Lab Results   Component Value Date    PREGTESTUR Negative 07/05/2022        ABGs: No results found for: PHART, PO2ART, FCI4HGO, SNW6ZUD, BEART, Q6XARIZL     Type & Screen (If Applicable):  Lab Results   Component Value Date    LABABO O 04/19/2013    [address] Positive 04/19/2013       Drug/Infectious Status (If Applicable):  No results found for: HIV, HEPCAB    COVID-19 Screening (If Applicable): No results found for: COVID19        Anesthesia Evaluation    Airway: Mallampati: II  TM distance: >3 FB   Neck ROM: full  Mouth opening: > = 3 FB   Dental:          Pulmonary:                              Cardiovascular:            Rhythm: regular  Rate: normal                    Neuro/Psych:   (+) headaches:, psychiatric history:            GI/Hepatic/Renal:             Endo/Other:                     Abdominal:             Vascular: Other Findings:           Anesthesia Plan      MAC     ASA 2       Induction: intravenous. Anesthetic plan and risks discussed with patient. Plan discussed with CRNA.                     RENY LARA Nimco Brothers MD   7/5/2022

## 2022-07-05 NOTE — H&P
Dionicioodin Deborah    [id number]    [facility] Same Day Surgery Update H & P  Department of General Surgery   Surgical Service   Pre-operative History and Physical  Last H & P within the last 30 days. DIAGNOSIS:   Benign neoplasm of left breast [D24.2]    Procedure(s):  LEFT BREAST EXCISIONAL BIOPSY    History obtained from: Patient interview and EHR      HISTORY OF PRESENT ILLNESS:   The patient is a 43 y.o. female with left breast mass presents today for excisional biopsy. Illness Screening: Patient denies fever, chills, worsening cough, or close contact with sick individuals. 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 was good, repeat recommended yearly at that point.     HNP (herniated nucleus pulposus), cervical     Medial meniscus tear     left knee    Menorrhagia     Migraine     TMJ (dislocation of temporomandibular joint)     Tobacco use     still smoking     Past Surgical History:        Procedure Laterality Date    CERVICAL SPINE SURGERY  04/27/2018    2 discs removed, cadaver put in and fused x2    ENDOMETRIAL ABLATION  4/19/2013    Jose, D&C, Diagnostic Hysteroscopy    KNEE ARTHROSCOPY Right [id number]    KNEE ARTHROSCOPY Right     KNEE SURGERY Left     KNEE SURGERY Right 2/26/13    LAPAROSCOPY      LEEP      OTHER SURGICAL HISTORY  05/22/2013    cystoscopy, trans vaginal taping    OTHER SURGICAL HISTORY  10-    Transvaginal taping take down    SHOULDER SURGERY Right 2019    UPPER GASTROINTESTINAL ENDOSCOPY N/A 3/27/2019    EGD DILATION SAVORY performed by Alfa Corea MD at [address] [facility]  3/27/2019    EGD BIOPSY performed by Alfa Corea MD at [address] [facility]         Medications Prior to Admission:      Prior to Admission medications    Medication Sig Start Date End Date Taking? Authorizing Provider   Rimegepant Sulfate (NURTEC) 75 MG TBDP Take by mouth as needed    Historical Provider, MD   valACYclovir (VALTREX) 1 g tablet TAKE TWO TABLETS BY MOUTH EVERY 12 HOURS FOR 1 DAY  Patient not taking: Reported on 6/29/2022 3/15/22   Andrew Manning MD   varenicline (CHANTIX) 1 MG tablet TAKE 1 TABLET TWICE A DAY. Patient not taking: Reported on 6/23/2022 2/7/22   Andrew Mannign MD   HYDROcodone-acetaminophen [facility]) 5-325 MG per tablet Take 1 tablet by mouth 2 times daily. Historical Provider, MD   fluticasone (FLONASE) 50 MCG/ACT nasal spray USE 1 SPRAY NASALLY DAILY 10/19/21   Isak PriestlyBRIDGER - CNP   hydrOXYzine (VISTARIL) 25 MG capsule Take 1 capsule by mouth 4 times daily as needed for Anxiety 10/19/21   Isak PriestBRIDGER oconnor - CNP   omeprazole (PRILOSEC) 20 MG delayed release capsule Take 20 mg by mouth daily    Historical Provider, MD   loratadine-pseudoephedrine (CLARITIN-D 24 HOUR)  MG per tablet Take 1 tablet by mouth daily 8/2/16   Andrew Manning MD   Multiple Vitamin (MULTI-VITAMIN PO) Take 1 tablet by mouth daily. Historical Provider, MD         Allergies:  Maxalt [rizatriptan benzoate], Imitrex [sumatriptan], and Penicillins    PHYSICAL EXAM:      /88   Pulse 98   Temp 98.2 °F (36.8 °C) (Temporal)   Resp 16   Ht 5' 3\" (1.6 m)   Wt 129 lb (58.5 kg)   SpO2 97%   BMI 22.85 kg/m²      Airway:  Airway patent with no audible stridor    Heart:  Regular rate and rhythm, No murmur noted    Lungs:  No increased work of breathing, good air exchange, clear to auscultation bilaterally, no crackles or wheezing    Abdomen:  Soft, non-distended, non-tender, no rebound tenderness or guarding, and no masses palpated    ASSESSMENT AND PLAN     Patient is a 43 y.o. female with above specified procedure planned.     1.  The patients history and physical was obtained and signed off by the pre-admission testing department. Patient seen and focused exam done today- no new changes since last physical exam on 6/23/22    2. Access to ancillary services are available per request of the provider.     BRIDGER Perez - NICKOLAS     7/5/2022

## 2022-07-05 NOTE — PROGRESS NOTES
Patient arrived to PACU post LEFT BREAST EXCISIONAL BIOPSY - Left with Dr. Lisseth Payne. VSS on arrival. CRNA gave PACU RN report at bedside stating BP support given, otherwose,  no complications during procedure. Surgical site clean, dry and intact. Patient shows no signs of pain at this time. Will continue to monitor.

## 2022-07-05 NOTE — PROGRESS NOTES
Ambulatory Surgery/Procedure Discharge Note    Vitals:    07/05/22 1614   BP: 100/70   Pulse: 57   Resp: 16   Temp: 97.1 °F (36.2 °C)   SpO2: 98%     Pt met criteria for discharge per Baldemar score. In: 753.6 [I.V.:753.6]  Out: 0     Restroom use offered before discharge. Yes    Pain assessment:  none  Pain Level: 0      Pt and S.O./family states \"ready to go home\". Pt alert and oriented x4. IV removed. Denies N/V or pain. Incision L breast well approximated, no bleeding, redness or hematoma. Voided prior to discharge. Discharge instructions given to pt and sister with pt permission. Pt and sister verbalized understanding of all instructions. Left with all belongings, 1 escribed prescription, and discharge instructions. Patient discharged to home/self care.  Patient discharged via wheel chair by transporter to waiting family/S.O.       7/5/2022 4:44 PM

## 2022-07-08 ENCOUNTER — TELEPHONE (OUTPATIENT)
Dept: FAMILY MEDICINE CLINIC | Age: 42
End: 2022-07-08

## 2022-07-08 NOTE — TELEPHONE ENCOUNTER
I do not see that she has any qualifying diagnosis in her history. I did not see any history of asthma, cardiac history, diabetes or auto-immune disorders. Plan  Recommend symptom control with over the counter medication. 1. Water: Drink 1/2 of body weight (lb) in ounces,  Up to 90 Ounces of water per day. This will loosen mucus in the head and chest & improve the weak feeling of dehydration, allow the body to get germ fighting resources to the infection. Half can be juice or sugar free powerade or garorate. Don't count drinks with caffeine or carbonation. Infants can have Pedialyte liquid or freezer pops. Avoid salt if you have high Blood Pressure, swelling in the feet or ankles or have heart problems. 2. Sleep: Get 8-10 hours a night and rest during the evening after work or school. If you have trouble sleeping, adults can take Melatonin 5mg up to 2 tabs at bedtime ( not for children or pregnant women). 3.Cough: Take cough medicines with Guaifenesin ( to loosen chest or head congestion) and Dextromethorphan ( to decrease excess cough). Robitussin D.M. Syrup every 4-6 hrs or Mucinex D. M. pills twice a day. Use the pediatric formulations for children over 6 months making sure they are alcohol & sugar free for children, pregnant women, and diabetics. 4. Pain And Fevers: Take Acetaminophen ( Tylenol) for fevers, aches, and headaches. 2-500 mg every 8 hours for adults. Appropriate doses at bedtime for children may help them sleep better. Ibuprofen may be used if not pregnant, but should be given with food to avoid nausea. Avoid Ibuprofen if you have high blood pressure, CHF, or kidney problems. 5.Gargle: (DAY ONE OF SYMPTOMS) Gargle in the back of the throat with the head tilted back and to the sides with a strong mouthwash  ( Listerine or Scope) after meals and at bedtime at least 4 -5 times a day.  This helps kill bacteria and viruses in the back of the throat and will shorten the duration and decrease the severity of your symptoms: sore throat, cough, ear popping,/ear pain, and possibly dizziness. 6. Smoking: Avoid smoking or exposure to second hand smoke. 7. Zinc: (DAY ONE OF SYMPTOMS)  Zinc lozenges such as Cold Edilberto (available most stores), or Basic ([employer] brand) will help shorten the duration and lessen symptoms such as sore throat, cough, nasal congestion, runny nose, and post nasal drip. Use 1 lozenge every 2-4 hours ( after meals if stomach is sensitive). Children can use 10-15 mg or less 3-4 times a day or Zinc lollypops. In pregnancy limit to 50-60 mg a day for 7 days as prenatals have Zinc also. With diarrhea use zinc pills 50 mg 1/2 to 1 pill 2x/day. 8. Vitamins: Vitamin C 500 mg with breakfast and dinner. Children and pregnant women should drink citrus juices. This speeds healing and strengthens immune system    9. Chest Symptoms: Vicks Vapor rub to the chest at bedtime. 10. Decongestants: Avoid all decongestants if you have high blood pressure. Safe to take if you do not have high blood pressure. 11.Try all of the above starting with day 1 of symptoms. Adults with fevers over 103 degrees or shortness of breath should call the office immediately. 12. Nasal saline spray or rinse. There are many different ways to do this OTC. I hope that you feel better. If you do not feel better after treatment, please f/u with pcp.

## 2022-07-08 NOTE — TELEPHONE ENCOUNTER
Patient has tested positive for covid. Her symptoms are sore throat and stuffy. She had a lumpectomy on Wednesday and has been taking pain pills and ibuprofen already. Patient was advised of the OTC medications. Patient also inquired on the medication to shorten her symptoms. Patient was informed that you would need to qualify and a message would be sent to provider to see if she does. Please advise.

## 2022-07-08 NOTE — TELEPHONE ENCOUNTER
Received call from Pt and was given msg from EG. Pt understood. Pt states that  will be out of his 10 day quarantine/ masking tomorrow. Pt would like to know if she still has to isolate from him.      Please advise

## 2022-07-08 NOTE — OP NOTE
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]    Post-Op Diagnosis: Same       Procedure(s):  LEFT BREAST EXCISIONAL BIOPSY    Surgeon(s):  Zulema Roger MD    Assistant:   Surgical Assistant: Manuel Bonds RN    Anesthesia: Monitor Anesthesia Care    Estimated Blood Loss (mL):  <88 mls    Complications: none    Specimens:   ID Type Source Tests Collected by Time Destination   A : left breast mass Tissue Tissue SURGICAL PATHOLOGY Zulema Roger MD 7/5/2022 1449        Implants:  * No implants in log *      Drains: * No LDAs found *    Findings: well-circumscribed mass with grossly negative margins    Indications: The patient is a 43year old woman who underwent an ultrasound-core biopsy of a mass in the left subareolar breast 6:00. The findings were consistent with a fibroepithelial lesion, indeterminate for fibroadenoma vs phyllodes tumor. Wide excision was recommended. Detailed Description of Procedure: The patient was taken to the operating room and placed in the supine position. After administration of IV sedation, the left breast was prepped and draped in the usual sterile fashion. We performed a field block using 1% lidocaine and 0.5% marcaine mixed in equal parts. We created a periareolar incision and elevated a flap over the mass. We proceeded with excision of the mass including approximately 1 cm margin. The excision was taken down to and including the pectoralis fascia. We oriented the mass using a Margin Map and submitted the specimen in formalin for permanent section. The surgical site was inspected for hemostasis and bleeding points were controlled using electrocautery. The incision was closed in several layers to obliterate the dead space.  The skin was closed in two layers with interrupted 3.0 Vicryl deep dermal sutures and a 4.0 Monocryl running subcuticular skin closure. The incision was sealed with Dermabond. The patient was taken to recovery in satisfactory condition having tolerated the procedure well.     Electronically signed by Zulema Roger MD on 7/8/2022 at 7:37 AM

## 2022-07-08 NOTE — TELEPHONE ENCOUNTER
They likely had the same variant and likely got it from one another so she would not need to isolate from him.

## 2022-07-27 ENCOUNTER — TELEPHONE (OUTPATIENT)
Dept: FAMILY MEDICINE CLINIC | Age: 42
End: 2022-07-27

## 2022-07-27 DIAGNOSIS — D48.62: Primary | ICD-10-CM

## 2022-09-13 ENCOUNTER — HOSPITAL ENCOUNTER (OUTPATIENT)
Dept: WOMENS IMAGING | Age: 42
Discharge: HOME | End: 2022-09-13
Payer: OTHER GOVERNMENT

## 2022-09-13 ENCOUNTER — TELEPHONE (OUTPATIENT)
Dept: SURGERY | Age: 42
End: 2022-09-13

## 2022-09-13 DIAGNOSIS — Z12.31 SCREENING MAMMOGRAM, ENCOUNTER FOR: ICD-10-CM

## 2022-09-13 PROCEDURE — 77067 SCR MAMMO BI INCL CAD: CPT

## 2022-09-13 NOTE — TELEPHONE ENCOUNTER
Referral received from Dr. Iron Doshi. Spoke with patient to schedule appointment. Patient will call the office back at a more convenient time.

## 2022-09-14 ENCOUNTER — TELEPHONE (OUTPATIENT)
Dept: WOMENS IMAGING | Age: 42
End: 2022-09-14

## 2022-09-21 ENCOUNTER — HOSPITAL ENCOUNTER (OUTPATIENT)
Dept: WOMENS IMAGING | Age: 42
Discharge: HOME | End: 2022-09-21
Payer: OTHER GOVERNMENT

## 2022-09-21 DIAGNOSIS — R92.8 ABNORMAL MAMMOGRAM: ICD-10-CM

## 2022-09-21 PROCEDURE — 76642 ULTRASOUND BREAST LIMITED: CPT

## 2022-09-21 PROCEDURE — G0279 TOMOSYNTHESIS, MAMMO: HCPCS

## 2022-09-23 DIAGNOSIS — F41.9 ANXIETY: ICD-10-CM

## 2022-09-23 RX ORDER — HYDROXYZINE PAMOATE 25 MG/1
CAPSULE ORAL
Qty: 60 CAPSULE | Refills: 0 | Status: SHIPPED | OUTPATIENT
Start: 2022-09-23 | End: 2022-10-27 | Stop reason: SDUPTHER

## 2022-10-10 ENCOUNTER — TELEPHONE (OUTPATIENT)
Dept: SURGERY | Age: 42
End: 2022-10-10

## 2022-10-10 NOTE — TELEPHONE ENCOUNTER
Spoke with Adri Sharma this AM.She stated that she would reach out to the office  to schedule after the MRI scheduled on 10/24.

## 2022-10-24 ENCOUNTER — HOSPITAL ENCOUNTER (OUTPATIENT)
Dept: MRI IMAGING | Age: 42
Discharge: HOME | End: 2022-10-24
Payer: OTHER GOVERNMENT

## 2022-10-24 DIAGNOSIS — R92.2 BREAST DENSITY: ICD-10-CM

## 2022-10-24 DIAGNOSIS — N63.25 BREAST LUMP ON LEFT SIDE AT 3 O'CLOCK POSITION: ICD-10-CM

## 2022-10-24 PROCEDURE — A9579 GAD-BASE MR CONTRAST NOS,1ML: HCPCS | Performed by: SURGERY

## 2022-10-24 PROCEDURE — 6360000004 HC RX CONTRAST MEDICATION: Performed by: SURGERY

## 2022-10-24 PROCEDURE — C8908 MRI W/O FOL W/CONT, BREAST,: HCPCS

## 2022-10-24 RX ADMIN — GADOTERIDOL 11 ML: 279.3 INJECTION, SOLUTION INTRAVENOUS at 10:11

## 2022-10-27 ENCOUNTER — OFFICE VISIT (OUTPATIENT)
Dept: FAMILY MEDICINE CLINIC | Age: 42
End: 2022-10-27
Payer: OTHER GOVERNMENT

## 2022-10-27 VITALS
WEIGHT: 134 LBS | HEIGHT: 63 IN | BODY MASS INDEX: 23.74 KG/M2 | OXYGEN SATURATION: 99 % | HEART RATE: 88 BPM | DIASTOLIC BLOOD PRESSURE: 70 MMHG | SYSTOLIC BLOOD PRESSURE: 112 MMHG

## 2022-10-27 DIAGNOSIS — F90.9 ATTENTION DEFICIT HYPERACTIVITY DISORDER (ADHD), UNSPECIFIED ADHD TYPE: ICD-10-CM

## 2022-10-27 DIAGNOSIS — Q79.60 EDS (EHLERS-DANLOS SYNDROME): ICD-10-CM

## 2022-10-27 DIAGNOSIS — R09.89 GLOBUS PHARYNGEUS: Primary | ICD-10-CM

## 2022-10-27 DIAGNOSIS — F41.9 ANXIETY: ICD-10-CM

## 2022-10-27 PROCEDURE — 99214 OFFICE O/P EST MOD 30 MIN: CPT | Performed by: FAMILY MEDICINE

## 2022-10-27 RX ORDER — AMPHETAMINE SULFATE 10 MG/1
10 TABLET ORAL 3 TIMES DAILY
Qty: 270 TABLET | Refills: 0 | Status: SHIPPED | OUTPATIENT
Start: 2022-10-27 | End: 2023-01-25

## 2022-10-27 RX ORDER — HYDROXYZINE PAMOATE 25 MG/1
CAPSULE ORAL
Qty: 60 CAPSULE | Refills: 0 | Status: SHIPPED | OUTPATIENT
Start: 2022-10-27

## 2022-10-27 RX ORDER — AMPHETAMINE SULFATE 10 MG/1
TABLET ORAL
Status: CANCELLED | OUTPATIENT
Start: 2022-10-27

## 2022-10-27 NOTE — PROGRESS NOTES
Krishan Velez (:  1980) is a 43 y.o. female,Established patient, here for evaluation of the following chief complaint(s):  Medication Check         ASSESSMENT/PLAN:  1. Globus pharyngeus  -     CT SOFT TISSUE NECK W WO CONTRAST; Future  2. Anxiety  -     hydrOXYzine pamoate (VISTARIL) 25 MG capsule; TAKE 1 CAPSULE FOUR TIMES A DAY AS NEEDED FOR ANXIETY, Disp-60 capsule, R-0Normal  3. Attention deficit hyperactivity disorder (ADHD), unspecified ADHD type  -     Amphetamine Sulfate (EVEKEO) 10 MG TABS; Take 10 mg by mouth 3 times daily for 90 days. , Disp-270 tablet, R-0Normal  4. EDS (Angeles-Danlos syndrome)  -     External Referral To Physical Therapy      No follow-ups on file. Subjective   SUBJECTIVE/OBJECTIVE:  Krishan Velez is a 43 y.o. female. Patient presents with:  Medication Check      1. Anxiety  Patient notes that she has been under a lot more stress recently. Patient notes that she has been having a lot of anxiety and stress anxiety attacks. Patient has been generally feeling worse over time. 2. Globus pharyngeus  Patient has persistent globus pharyngeus. This is been persistent. She did have a anterior cervical disc that was amended by the anterior approach. Patient did see ENT and had endoscope. External processes were ruled out with imaging. 3. Attention deficit hyperactivity disorder (ADHD), unspecified ADHD type  Has been doing well on vitamins although without any significant problems. In the no insomnia appetite suppression  4. EDS (Angeles-Danlos syndrome)  Significant history of issues with her joints. Would like to do some physical therapy. The patients PMH, surgical history, family history, medications, allergies were all reviewed and updated as appropriate today. Review of Systems       Objective   Physical Exam  Vitals and nursing note reviewed. Constitutional:       Appearance: Normal appearance. She is well-developed.    HENT:      Head: Normocephalic and atraumatic. Right Ear: External ear normal.      Left Ear: External ear normal.      Nose: Nose normal.   Eyes:      Conjunctiva/sclera: Conjunctivae normal.      Pupils: Pupils are equal, round, and reactive to light. Neck:      Thyroid: No thyroid mass, thyromegaly or thyroid tenderness. Cardiovascular:      Rate and Rhythm: Normal rate and regular rhythm. Heart sounds: Normal heart sounds. Pulmonary:      Effort: Pulmonary effort is normal.      Breath sounds: Normal breath sounds. Abdominal:      General: Bowel sounds are normal.      Palpations: Abdomen is soft. Musculoskeletal:         General: Normal range of motion. Cervical back: Normal range of motion and neck supple. Lymphadenopathy:      Cervical: No cervical adenopathy. Skin:     General: Skin is dry. Neurological:      Mental Status: She is alert and oriented to person, place, and time. Deep Tendon Reflexes: Reflexes are normal and symmetric. Psychiatric:         Behavior: Behavior normal.         Thought Content: Thought content normal.         Judgment: Judgment normal.              An electronic signature was used to authenticate this note.     --Sam Bullock MD

## 2022-12-29 ENCOUNTER — TELEPHONE (OUTPATIENT)
Dept: FAMILY MEDICINE CLINIC | Age: 42
End: 2022-12-29

## 2022-12-29 DIAGNOSIS — R09.89 GLOBUS PHARYNGEUS: Primary | ICD-10-CM

## 2022-12-29 NOTE — TELEPHONE ENCOUNTER
Johanne Abdullahi from radiation called in regards to this Pt. Johanne Abdullahi states Pt is coming in next week for a CT scan. Johanne Abdullahi would like to know if the order for the CT scan can be reordered to ct scan with contrast only so she is not exposing the Pt twice.  Call back number [phone] Johanne Abdullahi

## 2023-01-04 ENCOUNTER — HOSPITAL ENCOUNTER (OUTPATIENT)
Dept: CT IMAGING | Age: 43
Discharge: HOME | End: 2023-01-04
Payer: OTHER GOVERNMENT

## 2023-01-04 DIAGNOSIS — R09.89 GLOBUS PHARYNGEUS: ICD-10-CM

## 2023-01-04 PROCEDURE — 70491 CT SOFT TISSUE NECK W/DYE: CPT

## 2023-01-04 PROCEDURE — 6360000004 HC RX CONTRAST MEDICATION: Performed by: FAMILY MEDICINE

## 2023-01-04 RX ADMIN — IOPAMIDOL 75 ML: 755 INJECTION, SOLUTION INTRAVENOUS at 08:34

## 2023-01-18 ENCOUNTER — TELEPHONE (OUTPATIENT)
Dept: FAMILY MEDICINE CLINIC | Age: 43
End: 2023-01-18

## 2023-01-18 NOTE — TELEPHONE ENCOUNTER
Patient called into the office requesting routine lab work. Made patient aware that she was due for a physical and offered an appointment, but patient declined. Patient stated that she just wanted to have blood work done. Made her aware that Dr. Paula Cramer may not place orders without seeing her first. Please advise?

## 2023-01-18 NOTE — TELEPHONE ENCOUNTER
Patient should make appt, there are standard labs, but there may be things that she is wanting in addition and/or having symptoms.  Recommend physical.

## 2023-01-31 ENCOUNTER — OFFICE VISIT (OUTPATIENT)
Dept: FAMILY MEDICINE CLINIC | Age: 43
End: 2023-01-31
Payer: OTHER GOVERNMENT

## 2023-01-31 VITALS
DIASTOLIC BLOOD PRESSURE: 78 MMHG | BODY MASS INDEX: 24.8 KG/M2 | OXYGEN SATURATION: 98 % | SYSTOLIC BLOOD PRESSURE: 128 MMHG | HEIGHT: 63 IN | HEART RATE: 75 BPM | WEIGHT: 140 LBS

## 2023-01-31 DIAGNOSIS — Z13.1 SCREENING FOR DIABETES MELLITUS: ICD-10-CM

## 2023-01-31 DIAGNOSIS — R68.89 COLD INTOLERANCE: ICD-10-CM

## 2023-01-31 DIAGNOSIS — Z00.00 ENCOUNTER FOR WELL ADULT EXAM WITHOUT ABNORMAL FINDINGS: Primary | ICD-10-CM

## 2023-01-31 DIAGNOSIS — Z13.220 SCREENING FOR LIPID DISORDERS: ICD-10-CM

## 2023-01-31 DIAGNOSIS — Z00.00 ENCOUNTER FOR WELL ADULT EXAM WITHOUT ABNORMAL FINDINGS: ICD-10-CM

## 2023-01-31 LAB
A/G RATIO: 2 (ref 1.1–2.2)
ALBUMIN SERPL-MCNC: 4.5 G/DL (ref 3.4–5)
ALP BLD-CCNC: 52 U/L (ref 40–129)
ALT SERPL-CCNC: 13 U/L (ref 10–40)
ANION GAP SERPL CALCULATED.3IONS-SCNC: 10 MMOL/L (ref 3–16)
AST SERPL-CCNC: 20 U/L (ref 15–37)
BASOPHILS ABSOLUTE: 0.1 K/UL (ref 0–0.2)
BASOPHILS RELATIVE PERCENT: 0.9 %
BILIRUB SERPL-MCNC: 0.4 MG/DL (ref 0–1)
BUN BLDV-MCNC: 12 MG/DL (ref 7–20)
CALCIUM SERPL-MCNC: 9.1 MG/DL (ref 8.3–10.6)
CHLORIDE BLD-SCNC: 102 MMOL/L (ref 99–110)
CHOLESTEROL, FASTING: 179 MG/DL (ref 0–199)
CO2: 26 MMOL/L (ref 21–32)
CREAT SERPL-MCNC: 0.6 MG/DL (ref 0.6–1.1)
EOSINOPHILS ABSOLUTE: 0.1 K/UL (ref 0–0.6)
EOSINOPHILS RELATIVE PERCENT: 1.4 %
GFR SERPL CREATININE-BSD FRML MDRD: >60 ML/MIN/{1.73_M2}
GLUCOSE BLD-MCNC: 90 MG/DL (ref 70–99)
HCT VFR BLD CALC: 43 % (ref 36–48)
HDLC SERPL-MCNC: 66 MG/DL (ref 40–60)
HEMOGLOBIN: 14.4 G/DL (ref 12–16)
LDL CHOLESTEROL CALCULATED: 104 MG/DL
LYMPHOCYTES ABSOLUTE: 2.1 K/UL (ref 1–5.1)
LYMPHOCYTES RELATIVE PERCENT: 35.3 %
MCH RBC QN AUTO: 31.9 PG (ref 26–34)
MCHC RBC AUTO-ENTMCNC: 33.5 G/DL (ref 31–36)
MCV RBC AUTO: 95.3 FL (ref 80–100)
MONOCYTES ABSOLUTE: 0.6 K/UL (ref 0–1.3)
MONOCYTES RELATIVE PERCENT: 9.4 %
NEUTROPHILS ABSOLUTE: 3.2 K/UL (ref 1.7–7.7)
NEUTROPHILS RELATIVE PERCENT: 53 %
PDW BLD-RTO: 13.4 % (ref 12.4–15.4)
PLATELET # BLD: 192 K/UL (ref 135–450)
PMV BLD AUTO: 9.8 FL (ref 5–10.5)
POTASSIUM SERPL-SCNC: 4.1 MMOL/L (ref 3.5–5.1)
RBC # BLD: 4.51 M/UL (ref 4–5.2)
SODIUM BLD-SCNC: 138 MMOL/L (ref 136–145)
TOTAL PROTEIN: 6.8 G/DL (ref 6.4–8.2)
TRIGLYCERIDE, FASTING: 46 MG/DL (ref 0–150)
TSH REFLEX: 1.56 UIU/ML (ref 0.27–4.2)
VLDLC SERPL CALC-MCNC: 9 MG/DL
WBC # BLD: 6.1 K/UL (ref 4–11)

## 2023-01-31 PROCEDURE — 99396 PREV VISIT EST AGE 40-64: CPT | Performed by: FAMILY MEDICINE

## 2023-01-31 ASSESSMENT — PATIENT HEALTH QUESTIONNAIRE - PHQ9
1. LITTLE INTEREST OR PLEASURE IN DOING THINGS: 0
SUM OF ALL RESPONSES TO PHQ QUESTIONS 1-9: 7
8. MOVING OR SPEAKING SO SLOWLY THAT OTHER PEOPLE COULD HAVE NOTICED. OR THE OPPOSITE, BEING SO FIGETY OR RESTLESS THAT YOU HAVE BEEN MOVING AROUND A LOT MORE THAN USUAL: 2
6. FEELING BAD ABOUT YOURSELF - OR THAT YOU ARE A FAILURE OR HAVE LET YOURSELF OR YOUR FAMILY DOWN: 0
5. POOR APPETITE OR OVEREATING: 2
SUM OF ALL RESPONSES TO PHQ QUESTIONS 1-9: 7
2. FEELING DOWN, DEPRESSED OR HOPELESS: 0
9. THOUGHTS THAT YOU WOULD BE BETTER OFF DEAD, OR OF HURTING YOURSELF: 0
SUM OF ALL RESPONSES TO PHQ9 QUESTIONS 1 & 2: 0
10. IF YOU CHECKED OFF ANY PROBLEMS, HOW DIFFICULT HAVE THESE PROBLEMS MADE IT FOR YOU TO DO YOUR WORK, TAKE CARE OF THINGS AT HOME, OR GET ALONG WITH OTHER PEOPLE: 1
3. TROUBLE FALLING OR STAYING ASLEEP: 0
7. TROUBLE CONCENTRATING ON THINGS, SUCH AS READING THE NEWSPAPER OR WATCHING TELEVISION: 1
SUM OF ALL RESPONSES TO PHQ QUESTIONS 1-9: 7
SUM OF ALL RESPONSES TO PHQ QUESTIONS 1-9: 7
4. FEELING TIRED OR HAVING LITTLE ENERGY: 2

## 2023-01-31 ASSESSMENT — ENCOUNTER SYMPTOMS
RHINORRHEA: 0
DIARRHEA: 0
COLOR CHANGE: 0
CONSTIPATION: 0
SHORTNESS OF BREATH: 0
BACK PAIN: 0
EYE PAIN: 0
CHEST TIGHTNESS: 0
TROUBLE SWALLOWING: 0

## 2023-01-31 NOTE — PROGRESS NOTES
Well Adult Note  Name: Kaylin Sanchez Today’s Date: 2023   MRN: [id number] Sex: Female   Age: 42 y.o. Ethnicity: Non- / Non    : 1980 Race: White (non-)      Kaylin Sanchez is here for well adult exam.  History:  This is a 42-year-old female who presents for complete physical exam today.  Her only complaints today are that she has cold intolerance and has some fatigue.      Review of Systems   Constitutional:  Negative for fatigue and unexpected weight change.   HENT:  Negative for congestion, rhinorrhea and trouble swallowing.    Eyes:  Negative for pain and visual disturbance.   Respiratory:  Negative for chest tightness and shortness of breath.    Cardiovascular:  Negative for chest pain and palpitations.   Gastrointestinal:  Negative for constipation and diarrhea.   Endocrine: Negative for cold intolerance and heat intolerance.   Genitourinary:  Negative for frequency and urgency.   Musculoskeletal:  Negative for arthralgias and back pain.   Skin:  Negative for color change and rash.   Allergic/Immunologic: Negative for environmental allergies and food allergies.   Neurological:  Negative for dizziness and light-headedness.   Hematological:  Negative for adenopathy. Does not bruise/bleed easily.   Psychiatric/Behavioral:  Negative for dysphoric mood and sleep disturbance.      Allergies   Allergen Reactions    Maxalt [Rizatriptan Benzoate] Shortness Of Breath    Imitrex [Sumatriptan] Other (See Comments)     Felt like throat was closing      Penicillins Rash         Prior to Visit Medications    Medication Sig Taking? Authorizing Provider   hydrOXYzine pamoate (VISTARIL) 25 MG capsule TAKE 1 CAPSULE FOUR TIMES A DAY AS NEEDED FOR ANXIETY Yes Candelario Cabello MD   Amphetamine Sulfate 10 MG TABS 3 tablets/day Yes Historical Provider, MD   Rimegepant Sulfate (NURTEC) 75 MG TBDP Take by mouth as needed Yes Historical Provider, MD   valACYclovir (VALTREX) 1 g tablet TAKE TWO TABLETS BY  MOUTH EVERY 12 HOURS FOR 1 DAY Yes Jeanne Gayle MD   fluticasone (FLONASE) 50 MCG/ACT nasal spray USE 1 SPRAY NASALLY DAILY Yes BRIDGER Sargent - CNP   omeprazole (PRILOSEC) 20 MG delayed release capsule Take 20 mg by mouth daily Yes Historical Provider, MD   Multiple Vitamin (MULTI-VITAMIN PO) Take 1 tablet by mouth daily. Yes Historical Provider, MD   loratadine-pseudoephedrine (CLARITIN-D 24 HOUR)  MG per tablet Take 1 tablet by mouth daily  Patient not taking: Reported on 1/31/2023  Jeanne Gayle MD         Past Medical History:   Diagnosis Date    Anxiety     BRCA1 negative     BRCA2 negative     Depression     DUB (dysfunctional uterine bleeding)     Eczema     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 was good, repeat recommended yearly at that point.     HNP (herniated nucleus pulposus), cervical     Medial meniscus tear     left knee    Menorrhagia     Migraine     TMJ (dislocation of temporomandibular joint)     Tobacco use     still smoking       Past Surgical History:   Procedure Laterality Date    BREAST BIOPSY      BREAST SURGERY Left 07/05/2022    LEFT BREAST EXCISIONAL BIOPSY performed by Maksim Steen MD at [address] [place] [address] DISCECTOMY      revision with bone graft    CERVICAL SPINE SURGERY  04/27/2018    2 discs removed, cadaver put in and fused x2    ENDOMETRIAL ABLATION  04/19/2013    Jose D&C, Diagnostic Hysteroscopy    KNEE ARTHROSCOPY Right 11/19/2013    KNEE ARTHROSCOPY Right     KNEE SURGERY Left     KNEE SURGERY Right 02/26/2013    LAPAROSCOPY      LEEP      OTHER SURGICAL HISTORY  05/22/2013    cystoscopy, trans vaginal taping    OTHER SURGICAL HISTORY  10/16/2013    Transvaginal taping take down    SHOULDER SURGERY Right 2019    UPPER GASTROINTESTINAL ENDOSCOPY N/A 03/27/2019    EGD DILATION SAVORY performed by Deya Brennan MD at [facility]  03/27/2019    EGD BIOPSY performed by Lauren Betancourt MD at [facility] 132 EXTRACTION           Family History   Problem Relation Age of Onset    Hypertension Father     Hypertension Maternal Grandmother     Breast Cancer Maternal Grandmother     Cancer Maternal Grandmother     Mental Illness Mother     Breast Cancer Mother         mets to bone and other     Cancer Mother     Hypertension Maternal Grandfather     Cancer Maternal Aunt        Social History     Tobacco Use    Smoking status: Former     Packs/day: 0.50     Years: 15.00     Pack years: 7.50     Types: Cigarettes     Start date: 7/1/2012    Smokeless tobacco: Never    Tobacco comments:     pack week    Vaping Use    Vaping Use: Never used   Substance Use Topics    Alcohol use: Never    Drug use: No       Objective   /78   Pulse 75   Ht 5' 3\" (1.6 m)   Wt 140 lb (63.5 kg)   SpO2 98%   BMI 24.80 kg/m²   Wt Readings from Last 3 Encounters:   01/31/23 140 lb (63.5 kg)   10/27/22 134 lb (60.8 kg)   07/05/22 129 lb (58.5 kg)     There were no vitals filed for this visit. Physical Exam  Vitals and nursing note reviewed. Constitutional:       Appearance: Normal appearance. She is well-developed. HENT:      Head: Normocephalic and atraumatic. Right Ear: External ear normal.      Left Ear: External ear normal.      Nose: Nose normal.   Eyes:      Conjunctiva/sclera: Conjunctivae normal.      Pupils: Pupils are equal, round, and reactive to light. Cardiovascular:      Rate and Rhythm: Normal rate and regular rhythm. Heart sounds: Normal heart sounds. Pulmonary:      Effort: Pulmonary effort is normal.      Breath sounds: Normal breath sounds. Abdominal:      General: Bowel sounds are normal.      Palpations: Abdomen is soft. Musculoskeletal:         General: Normal range of motion. Cervical back: Normal range of motion and neck supple. Skin:     General: Skin is dry.    Neurological:      Mental Status: She is alert and oriented to person, place, and time.      Deep Tendon Reflexes: Reflexes are normal and symmetric.   Psychiatric:         Behavior: Behavior normal.         Thought Content: Thought content normal.         Judgment: Judgment normal.         Assessment   Plan   1. Encounter for well adult exam without abnormal findings  -     Comprehensive Metabolic Panel; Future  2. Cold intolerance  -     CBC with Auto Differential; Future  -     TSH with Reflex; Future  3. Screening for diabetes mellitus  -     Hemoglobin A1C; Future  4. Screening for lipid disorders  -     Lipid, Fasting; Future       Personalized Preventive Plan   Current Health Maintenance Status  Immunization History   Administered Date(s) Administered    COVID-19, MODERNA BLUE border, Primary or Immunocompromised, (age 12y+), IM, 100 mcg/0.5mL 03/30/2021, 04/27/2021    DT (pediatric) 04/01/2009    HPV 9-valent (Gardasil9) 01/29/2021, 02/26/2021, 07/29/2021    Influenza, FLUBLOK, (age 18 y+), PF, 0.5mL 10/13/2020    Tdap (Boostrix, Adacel) 07/21/2016        Health Maintenance   Topic Date Due    Varicella vaccine (1 of 2 - 2-dose childhood series) Never done    HIV screen  Never done    Hepatitis C screen  Never done    Cervical cancer screen  03/28/2018    COVID-19 Vaccine (3 - Booster for Moderna series) 06/22/2021    Flu vaccine (1) 08/01/2022    Depression Monitoring  06/23/2023    Breast cancer screen  09/21/2023    DTaP/Tdap/Td vaccine (3 - Td or Tdap) 07/21/2026    Lipids  11/09/2026    Hepatitis A vaccine  Aged Out    Hib vaccine  Aged Out    Meningococcal (ACWY) vaccine  Aged Out    Pneumococcal 0-64 years Vaccine  Aged Out     Recommendations for Preventive Services Due: see orders and patient instructions/AVS.    No follow-ups on file.

## 2023-02-01 LAB
ESTIMATED AVERAGE GLUCOSE: 102.5 MG/DL
HBA1C MFR BLD: 5.2 %

## 2023-03-23 DIAGNOSIS — J01.00 ACUTE NON-RECURRENT MAXILLARY SINUSITIS: ICD-10-CM

## 2023-03-23 DIAGNOSIS — F41.9 ANXIETY: ICD-10-CM

## 2023-03-24 RX ORDER — HYDROXYZINE PAMOATE 25 MG/1
CAPSULE ORAL
Qty: 60 CAPSULE | Refills: 23 | Status: SHIPPED | OUTPATIENT
Start: 2023-03-24

## 2023-03-24 RX ORDER — FLUTICASONE PROPIONATE 50 MCG
SPRAY, SUSPENSION (ML) NASAL
Qty: 48 G | Refills: 5 | Status: SHIPPED | OUTPATIENT
Start: 2023-03-24

## 2023-09-14 ENCOUNTER — HOSPITAL ENCOUNTER (OUTPATIENT)
Dept: WOMENS IMAGING | Age: 43
Discharge: HOME | End: 2023-09-14
Payer: OTHER GOVERNMENT

## 2023-09-14 VITALS — HEIGHT: 63 IN | WEIGHT: 135 LBS | BODY MASS INDEX: 23.92 KG/M2

## 2023-09-14 DIAGNOSIS — Z12.31 ENCOUNTER FOR SCREENING MAMMOGRAM FOR MALIGNANT NEOPLASM OF BREAST: ICD-10-CM

## 2023-09-14 PROCEDURE — 77063 BREAST TOMOSYNTHESIS BI: CPT

## 2023-09-18 ENCOUNTER — TRANSCRIBE ORDERS (OUTPATIENT)
Dept: ADMINISTRATIVE | Age: 43
End: 2023-09-18

## 2023-09-18 DIAGNOSIS — R92.2 INCONCLUSIVE MAMMOGRAPHY DUE TO DENSE BREASTS: Primary | ICD-10-CM

## 2023-10-24 ENCOUNTER — TELEPHONE (OUTPATIENT)
Dept: FAMILY MEDICINE CLINIC | Age: 43
End: 2023-10-24

## 2023-10-24 DIAGNOSIS — Z00.00 WELL ADULT EXAM: ICD-10-CM

## 2023-10-24 DIAGNOSIS — F90.9 ATTENTION DEFICIT HYPERACTIVITY DISORDER (ADHD), UNSPECIFIED ADHD TYPE: Primary | ICD-10-CM

## 2023-10-24 DIAGNOSIS — Z13.1 SCREENING FOR DIABETES MELLITUS: ICD-10-CM

## 2023-10-24 DIAGNOSIS — Z13.220 SCREENING, LIPID: Primary | ICD-10-CM

## 2023-10-24 RX ORDER — AMPHETAMINE SULFATE 10 MG/1
30 TABLET ORAL DAILY
Qty: 90 TABLET | Refills: 0 | Status: SHIPPED | OUTPATIENT
Start: 2023-10-24 | End: 2023-11-23

## 2023-10-24 NOTE — TELEPHONE ENCOUNTER
----- Message from Femi Saavedra sent at 10/24/2023  4:06 PM EDT -----  Subject: Referral Request    Reason for referral request? Routine blood work prior to physical  Provider patient wants to be referred to(if known):     Provider Phone Number(if known): Additional Information for Provider?  Please call when order is ready  ---------------------------------------------------------------------------  --------------  [address]    [phone]; OK to leave message on voicemail  ---------------------------------------------------------------------------  --------------

## 2023-10-24 NOTE — TELEPHONE ENCOUNTER
----- Message from Cele Narciso sent at 10/24/2023  4:03 PM EDT -----  Subject: Refill Request    QUESTIONS  Name of Medication? Amphetamine Sulfate (EVEKEO) 10 MG TABS  Patient-reported dosage and instructions? 10mg 3 tabs a day  How many days do you have left? 0  Preferred Pharmacy? [facility] phone number (if available)? [phone]  ---------------------------------------------------------------------------  --------------  CALL BACK INFO  What is the best way for the office to contact you? OK to leave message on   voicemail  Preferred Call Back Phone Number? [phone]  ---------------------------------------------------------------------------  --------------  SCRIPT ANSWERS  Relationship to Patient?  Self

## 2023-10-24 NOTE — TELEPHONE ENCOUNTER
Last Office Visit  -  04/21/23  Next Office Visit  -  02/01/24    Last Filled  -  04/13/23  Last UDS -  10/16/19  Contract -  10/17/19

## 2023-12-11 ENCOUNTER — HOSPITAL ENCOUNTER (OUTPATIENT)
Dept: MRI IMAGING | Age: 43
Discharge: HOME | End: 2023-12-11
Attending: SURGERY
Payer: OTHER GOVERNMENT

## 2023-12-11 DIAGNOSIS — R92.30 INCONCLUSIVE MAMMOGRAPHY DUE TO DENSE BREASTS: ICD-10-CM

## 2023-12-11 DIAGNOSIS — R92.2 INCONCLUSIVE MAMMOGRAPHY DUE TO DENSE BREASTS: ICD-10-CM

## 2023-12-11 PROCEDURE — 6360000004 HC RX CONTRAST MEDICATION: Performed by: SURGERY

## 2023-12-11 PROCEDURE — C8908 MRI W/O FOL W/CONT, BREAST,: HCPCS

## 2023-12-11 PROCEDURE — A9579 GAD-BASE MR CONTRAST NOS,1ML: HCPCS | Performed by: SURGERY

## 2023-12-11 RX ADMIN — GADOTERIDOL 12 ML: 279.3 INJECTION, SOLUTION INTRAVENOUS at 09:51

## 2024-01-04 DIAGNOSIS — B00.1 RECURRENT COLD SORES: ICD-10-CM

## 2024-01-04 DIAGNOSIS — F90.9 ATTENTION DEFICIT HYPERACTIVITY DISORDER (ADHD), UNSPECIFIED ADHD TYPE: ICD-10-CM

## 2024-01-04 RX ORDER — OMEPRAZOLE 20 MG/1
20 CAPSULE, DELAYED RELEASE ORAL DAILY
Qty: 30 CAPSULE | Status: CANCELLED | OUTPATIENT
Start: 2024-01-04

## 2024-01-04 RX ORDER — VALACYCLOVIR HYDROCHLORIDE 1 G/1
TABLET, FILM COATED ORAL
Qty: 4 TABLET | Refills: 1 | Status: SHIPPED | OUTPATIENT
Start: 2024-01-04

## 2024-01-04 RX ORDER — AMPHETAMINE SULFATE 10 MG/1
30 TABLET ORAL DAILY
Qty: 90 TABLET | Refills: 0 | Status: SHIPPED | OUTPATIENT
Start: 2024-01-04 | End: 2024-01-05 | Stop reason: SDUPTHER

## 2024-01-04 RX ORDER — OMEPRAZOLE 20 MG/1
20 CAPSULE, DELAYED RELEASE ORAL DAILY
Qty: 90 CAPSULE | Refills: 3 | Status: SHIPPED | OUTPATIENT
Start: 2024-01-04

## 2024-01-04 NOTE — TELEPHONE ENCOUNTER
Last Office Visit  -  1/31/2023  Next Office Visit  -  02/05/24    Last Filled  -  11/23/23  Last UDS -    Contract -      [facility] to [facility], pended

## 2024-01-04 NOTE — TELEPHONE ENCOUNTER
Last Office Visit  -  1/31/2023  Next Office Visit  -  02/05/24    Last Filled  -  unavailable, historical provider  Last UDS -    Contract -

## 2024-01-05 RX ORDER — AMPHETAMINE SULFATE 10 MG/1
30 TABLET ORAL DAILY
Qty: 90 TABLET | Refills: 0 | Status: SHIPPED | OUTPATIENT
Start: 2024-01-05 | End: 2024-02-04

## 2024-01-05 NOTE — TELEPHONE ENCOUNTER
Patient called in stating rx is out of stock but it is in stock at [facility] in [place]. Can this be sent there please?    Amphetamine Sulfate      Advise

## 2024-01-17 ENCOUNTER — TELEPHONE (OUTPATIENT)
Dept: FAMILY MEDICINE CLINIC | Age: 44
End: 2024-01-17

## 2024-01-17 NOTE — TELEPHONE ENCOUNTER
Patient requesting an Mri of the back. Pt states she hasn't seen provider for this issue in over a yr I did tell her this is something she will need to be seen for to discuss the matter more, she insisted I just send a note back to request it since she has been seen for this in the past.

## 2024-01-22 ENCOUNTER — OFFICE VISIT (OUTPATIENT)
Dept: FAMILY MEDICINE CLINIC | Age: 44
End: 2024-01-22
Payer: OTHER GOVERNMENT

## 2024-01-22 VITALS
SYSTOLIC BLOOD PRESSURE: 122 MMHG | OXYGEN SATURATION: 98 % | HEART RATE: 105 BPM | WEIGHT: 142 LBS | DIASTOLIC BLOOD PRESSURE: 80 MMHG | BODY MASS INDEX: 25.16 KG/M2 | HEIGHT: 63 IN

## 2024-01-22 DIAGNOSIS — F17.210 CIGARETTE NICOTINE DEPENDENCE WITHOUT COMPLICATION: Primary | ICD-10-CM

## 2024-01-22 DIAGNOSIS — M54.50 LOW BACK PAIN, UNSPECIFIED BACK PAIN LATERALITY, UNSPECIFIED CHRONICITY, UNSPECIFIED WHETHER SCIATICA PRESENT: ICD-10-CM

## 2024-01-22 PROCEDURE — 99214 OFFICE O/P EST MOD 30 MIN: CPT | Performed by: FAMILY MEDICINE

## 2024-01-22 RX ORDER — VARENICLINE TARTRATE 0.5 (11)-1
KIT ORAL
Qty: 53 EACH | Refills: 0 | Status: SHIPPED | OUTPATIENT
Start: 2024-01-22

## 2024-01-22 RX ORDER — OXYCODONE HYDROCHLORIDE AND ACETAMINOPHEN 5; 325 MG/1; MG/1
TABLET ORAL
COMMUNITY
Start: 2024-01-10

## 2024-01-22 SDOH — ECONOMIC STABILITY: HOUSING INSECURITY
IN THE LAST 12 MONTHS, WAS THERE A TIME WHEN YOU DID NOT HAVE A STEADY PLACE TO SLEEP OR SLEPT IN A SHELTER (INCLUDING NOW)?: NO

## 2024-01-22 SDOH — ECONOMIC STABILITY: FOOD INSECURITY: WITHIN THE PAST 12 MONTHS, YOU WORRIED THAT YOUR FOOD WOULD RUN OUT BEFORE YOU GOT MONEY TO BUY MORE.: NEVER TRUE

## 2024-01-22 SDOH — ECONOMIC STABILITY: INCOME INSECURITY: HOW HARD IS IT FOR YOU TO PAY FOR THE VERY BASICS LIKE FOOD, HOUSING, MEDICAL CARE, AND HEATING?: NOT HARD AT ALL

## 2024-01-22 SDOH — ECONOMIC STABILITY: TRANSPORTATION INSECURITY
IN THE PAST 12 MONTHS, HAS LACK OF TRANSPORTATION KEPT YOU FROM MEETINGS, WORK, OR FROM GETTING THINGS NEEDED FOR DAILY LIVING?: NO

## 2024-01-22 SDOH — ECONOMIC STABILITY: FOOD INSECURITY: WITHIN THE PAST 12 MONTHS, THE FOOD YOU BOUGHT JUST DIDN'T LAST AND YOU DIDN'T HAVE MONEY TO GET MORE.: NEVER TRUE

## 2024-01-22 ASSESSMENT — PATIENT HEALTH QUESTIONNAIRE - PHQ9
7. TROUBLE CONCENTRATING ON THINGS, SUCH AS READING THE NEWSPAPER OR WATCHING TELEVISION: 1
SUM OF ALL RESPONSES TO PHQ QUESTIONS 1-9: 6
1. LITTLE INTEREST OR PLEASURE IN DOING THINGS: 0
4. FEELING TIRED OR HAVING LITTLE ENERGY: 1
7. TROUBLE CONCENTRATING ON THINGS, SUCH AS READING THE NEWSPAPER OR WATCHING TELEVISION: SEVERAL DAYS
5. POOR APPETITE OR OVEREATING: 2
6. FEELING BAD ABOUT YOURSELF - OR THAT YOU ARE A FAILURE OR HAVE LET YOURSELF OR YOUR FAMILY DOWN: NOT AT ALL
SUM OF ALL RESPONSES TO PHQ QUESTIONS 1-9: 6
9. THOUGHTS THAT YOU WOULD BE BETTER OFF DEAD, OR OF HURTING YOURSELF: NOT AT ALL
3. TROUBLE FALLING OR STAYING ASLEEP: 2
1. LITTLE INTEREST OR PLEASURE IN DOING THINGS: NOT AT ALL
8. MOVING OR SPEAKING SO SLOWLY THAT OTHER PEOPLE COULD HAVE NOTICED. OR THE OPPOSITE - BEING SO FIDGETY OR RESTLESS THAT YOU HAVE BEEN MOVING AROUND A LOT MORE THAN USUAL: NOT AT ALL
5. POOR APPETITE OR OVEREATING: MORE THAN HALF THE DAYS
2. FEELING DOWN, DEPRESSED OR HOPELESS: NOT AT ALL
2. FEELING DOWN, DEPRESSED OR HOPELESS: 0
4. FEELING TIRED OR HAVING LITTLE ENERGY: SEVERAL DAYS
10. IF YOU CHECKED OFF ANY PROBLEMS, HOW DIFFICULT HAVE THESE PROBLEMS MADE IT FOR YOU TO DO YOUR WORK, TAKE CARE OF THINGS AT HOME, OR GET ALONG WITH OTHER PEOPLE: 1
9. THOUGHTS THAT YOU WOULD BE BETTER OFF DEAD, OR OF HURTING YOURSELF: 0
SUM OF ALL RESPONSES TO PHQ9 QUESTIONS 1 & 2: 0
8. MOVING OR SPEAKING SO SLOWLY THAT OTHER PEOPLE COULD HAVE NOTICED. OR THE OPPOSITE, BEING SO FIGETY OR RESTLESS THAT YOU HAVE BEEN MOVING AROUND A LOT MORE THAN USUAL: 0
3. TROUBLE FALLING OR STAYING ASLEEP: MORE THAN HALF THE DAYS
6. FEELING BAD ABOUT YOURSELF - OR THAT YOU ARE A FAILURE OR HAVE LET YOURSELF OR YOUR FAMILY DOWN: 0
10. IF YOU CHECKED OFF ANY PROBLEMS, HOW DIFFICULT HAVE THESE PROBLEMS MADE IT FOR YOU TO DO YOUR WORK, TAKE CARE OF THINGS AT HOME, OR GET ALONG WITH OTHER PEOPLE: SOMEWHAT DIFFICULT
SUM OF ALL RESPONSES TO PHQ QUESTIONS 1-9: 6

## 2024-01-22 NOTE — PROGRESS NOTES
Kaylin Sanchez (:  1980) is a 43 y.o. female,Established patient, here for evaluation of the following chief complaint(s):  Back Pain (Requesting MRI )         ASSESSMENT/PLAN:  1. Cigarette nicotine dependence without complication  -     Varenicline Tartrate, Starter, 0.5 MG X 11 & 1 MG X 42 TBPK; As directed on nicolas., Disp-53 each, R-0Normal  2. Low back pain, unspecified back pain laterality, unspecified chronicity, unspecified whether sciatica present  Currently improved continue to monitor for getting things worse.    No follow-ups on file.         Subjective   SUBJECTIVE/OBJECTIVE:  Kaylin Sanchez is a 43 y.o. female. Patient presents with:  Back Pain: Requesting MRI       BACK pain:  Hurts from back to both legs.  Patient taking percocet and ibuprofen, stretches and pain no better.  Patient was not sleeping for one week.  Patient woke up and pain was gone.    Patient also would like to consider smoking cessation.  She has been trying to cut down but having some difficulty.  She notes that she uses as a stress reduction strategy but notes that it has not significantly been getting better.  The patients PMH, surgical history, family history, medications, allergies were all reviewed and updated as appropriate today.      Back Pain        Review of Systems   Musculoskeletal:  Positive for back pain.          Objective   Physical Exam  Vitals and nursing note reviewed.   Constitutional:       Appearance: Normal appearance. She is well-developed.   HENT:      Head: Normocephalic and atraumatic.      Right Ear: External ear normal.      Left Ear: External ear normal.      Nose: Nose normal.   Eyes:      Conjunctiva/sclera: Conjunctivae normal.      Pupils: Pupils are equal, round, and reactive to light.   Cardiovascular:      Rate and Rhythm: Normal rate and regular rhythm.      Heart sounds: Normal heart sounds.   Pulmonary:      Effort: Pulmonary effort is normal.      Breath sounds: Normal breath sounds.

## 2024-01-26 ENCOUNTER — TELEPHONE (OUTPATIENT)
Dept: FAMILY MEDICINE CLINIC | Age: 44
End: 2024-01-26

## 2024-01-26 NOTE — TELEPHONE ENCOUNTER
Pt has restrictions due to a [facility] case and has received a Jury Duty summons and is not able to participate due to these restrictions. Will you be able to write the letter? She would have contacted the [facility] doctor but she hasn't seen them in a long time. Please advise.

## 2024-01-29 ENCOUNTER — OFFICE VISIT (OUTPATIENT)
Dept: SURGERY | Age: 44
End: 2024-01-29
Payer: OTHER GOVERNMENT

## 2024-01-29 VITALS
TEMPERATURE: 98.2 F | OXYGEN SATURATION: 100 % | SYSTOLIC BLOOD PRESSURE: 124 MMHG | DIASTOLIC BLOOD PRESSURE: 87 MMHG | BODY MASS INDEX: 25.34 KG/M2 | HEIGHT: 63 IN | HEART RATE: 102 BPM | RESPIRATION RATE: 16 BRPM | WEIGHT: 143 LBS

## 2024-01-29 DIAGNOSIS — F17.200 SMOKING: ICD-10-CM

## 2024-01-29 DIAGNOSIS — Z91.89 AT HIGH RISK FOR BREAST CANCER: Primary | ICD-10-CM

## 2024-01-29 PROCEDURE — 99205 OFFICE O/P NEW HI 60 MIN: CPT | Performed by: SURGERY

## 2024-01-29 NOTE — PROGRESS NOTES
[facility]    CC: High Risk for Development of Breast CA     Referring physician: Debi Gee MD    HPI: This is a 43 y.o. female with a PMHx as delineated below who presents in consultation for breast reconstruction. She was found to have high risk for development of breast cancer and desires to proceed with bilateral mastectomy with reconstruction.  Plastic surgery was consulted for evaluation and treatment.The specifics of her breast cancer workup / treatment is as follows:     Diagnosis: High Risk for 11 years  Breast Involved:NA  Surgery: Left lumpectomy  Date of Surgery: 2022  Tumor Size & Grade: NA  Stage: NA  Ave status: NA  ER:  NA  AL:  NA  HER2:  NA    Post Op Plan:  Oncologist: NA  Radiation: NA    Chemo/Meds: NA  Pregnancy/Miscarriages: 2 / 0    PMHx:   Past Medical History:   Diagnosis Date    Anxiety     BRCA1 negative     BRCA2 negative     Depression     DUB (dysfunctional uterine bleeding)     Eczema     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  was good, repeat recommended yearly at that point.    HNP (herniated nucleus pulposus), cervical     Medial meniscus tear     left knee    Menorrhagia     Migraine     TMJ (dislocation of temporomandibular joint)     Tobacco use     still smoking   Migraines (Nurtec)    PSHx:   Past Surgical History:   Procedure Laterality Date    BREAST BIOPSY      BREAST SURGERY Left 07/05/2022    LEFT BREAST EXCISIONAL BIOPSY performed by Haritha Duckworth MD at [facility] OR    CERVICAL DISCECTOMY      revision with bone graft    CERVICAL SPINE SURGERY  04/27/2018    2 discs removed, cadaver put in and fused x2    ENDOMETRIAL ABLATION  04/19/2013    Novasure, D&C, Diagnostic Hysteroscopy    KNEE ARTHROSCOPY Right 11/19/2013    KNEE ARTHROSCOPY Right     KNEE SURGERY Left     KNEE SURGERY Right 02/26/2013    LAPAROSCOPY      LEEP      OTHER SURGICAL HISTORY  05/22/2013    cystoscopy, trans vaginal taping

## 2024-01-29 NOTE — TELEPHONE ENCOUNTER
Pt wants to know if you will write the letter without using the [facility] restrictions?

## 2024-01-29 NOTE — TELEPHONE ENCOUNTER
Because of the nature of the restriction coming from [employer], she should get this through them.

## 2024-02-05 ENCOUNTER — OFFICE VISIT (OUTPATIENT)
Dept: FAMILY MEDICINE CLINIC | Age: 44
End: 2024-02-05
Payer: OTHER GOVERNMENT

## 2024-02-05 VITALS
HEIGHT: 63 IN | SYSTOLIC BLOOD PRESSURE: 138 MMHG | DIASTOLIC BLOOD PRESSURE: 78 MMHG | BODY MASS INDEX: 25.16 KG/M2 | WEIGHT: 142 LBS | HEART RATE: 90 BPM | OXYGEN SATURATION: 98 %

## 2024-02-05 DIAGNOSIS — E66.3 OVERWEIGHT: Primary | ICD-10-CM

## 2024-02-05 DIAGNOSIS — Z71.89 ACP (ADVANCE CARE PLANNING): ICD-10-CM

## 2024-02-05 DIAGNOSIS — Z00.00 ENCOUNTER FOR WELL ADULT EXAM WITHOUT ABNORMAL FINDINGS: ICD-10-CM

## 2024-02-05 PROCEDURE — 99396 PREV VISIT EST AGE 40-64: CPT | Performed by: FAMILY MEDICINE

## 2024-02-05 RX ORDER — PHENTERMINE HYDROCHLORIDE 37.5 MG/1
37.5 TABLET ORAL
Qty: 30 TABLET | Refills: 0 | Status: SHIPPED | OUTPATIENT
Start: 2024-02-05 | End: 2024-03-06

## 2024-02-05 NOTE — PROGRESS NOTES
Grandmother     Cancer Maternal Grandmother     Mental Illness Mother     Breast Cancer Mother         mets to bone and other     Cancer Mother     Hypertension Maternal Grandfather     Cancer Maternal Aunt        Social History     Tobacco Use    Smoking status: Every Day     Current packs/day: 0.50     Average packs/day: 0.5 packs/day for 15.0 years (7.5 ttl pk-yrs)     Types: Cigarettes     Start date: 7/1/2012    Smokeless tobacco: Never    Tobacco comments:     pack week    Vaping Use    Vaping Use: Never used   Substance Use Topics    Alcohol use: Never    Drug use: No       Objective     Vital Signs  /78   Pulse 90   Ht 1.6 m (5' 3\")   Wt 64.4 kg (142 lb)   SpO2 98%   BMI 25.15 kg/m²   Wt Readings from Last 3 Encounters:   02/05/24 64.4 kg (142 lb)   01/29/24 64.9 kg (143 lb)   01/22/24 64.4 kg (142 lb)       Waist Circumference  There were no vitals filed for this visit.    Physical Exam    Assessment   Plan   1. Overweight  -     phentermine (ADIPEX-P) 37.5 MG tablet; Take 1 tablet by mouth every morning (before breakfast) for 30 days. Max Daily Amount: 37.5 mg, Disp-30 tablet, R-0Normal  2. ACP (advance care planning)  3. Encounter for well adult exam without abnormal findings         Personalized Preventive Plan   Current Health Maintenance Status  Immunization History   Administered Date(s) Administered    COVID-19, MODERNA BLUE border, Primary or Immunocompromised, (age 12y+), IM, 100 mcg/0.5mL 03/30/2021, 04/27/2021    DT (pediatric) 04/01/2009    HPV, GARDASIL 9, (age 9y-45y), IM, 0.5mL 01/29/2021, 02/26/2021, 07/29/2021    Influenza, FLUBLOK, (age 18 y+), PF, 0.5mL 10/13/2020    TDaP, ADACEL (age 10y-64y), BOOSTRIX (age 10y+), IM, 0.5mL 07/21/2016        Health Maintenance   Topic Date Due    Hepatitis B vaccine (1 of 3 - 3-dose series) Never done    Varicella vaccine (1 of 2 - 2-dose childhood series) Never done    Pneumococcal 0-64 years Vaccine (1 - PCV) Never done    HIV screen  Never

## 2024-02-14 NOTE — PATIENT INSTRUCTIONS

## 2024-02-19 DIAGNOSIS — B00.1 RECURRENT COLD SORES: ICD-10-CM

## 2024-02-19 DIAGNOSIS — F90.9 ATTENTION DEFICIT HYPERACTIVITY DISORDER (ADHD), UNSPECIFIED ADHD TYPE: ICD-10-CM

## 2024-02-19 RX ORDER — DEXTROAMPHETAMINE SACCHARATE, AMPHETAMINE ASPARTATE, DEXTROAMPHETAMINE SULFATE AND AMPHETAMINE SULFATE 5; 5; 5; 5 MG/1; MG/1; MG/1; MG/1
30 TABLET ORAL DAILY
Qty: 45 TABLET | Refills: 0 | Status: SHIPPED | OUTPATIENT
Start: 2024-02-19 | End: 2024-03-20

## 2024-02-19 RX ORDER — VALACYCLOVIR HYDROCHLORIDE 1 G/1
TABLET, FILM COATED ORAL
Qty: 4 TABLET | Refills: 1 | Status: SHIPPED | OUTPATIENT
Start: 2024-02-19

## 2024-02-19 NOTE — TELEPHONE ENCOUNTER
Last Office Visit  -  2/5/24  Next Office Visit  -  3/1/24    Last Filled  -  1/8/24  Last UDS -  10/16/19  Contract -  n/a

## 2024-02-26 DIAGNOSIS — F17.210 CIGARETTE NICOTINE DEPENDENCE WITHOUT COMPLICATION: ICD-10-CM

## 2024-02-27 RX ORDER — VARENICLINE TARTRATE 0.5 (11)-1
KIT ORAL
Qty: 53 EACH | Refills: 0 | Status: SHIPPED | OUTPATIENT
Start: 2024-02-27

## 2024-03-01 ENCOUNTER — TELEMEDICINE (OUTPATIENT)
Dept: FAMILY MEDICINE CLINIC | Age: 44
End: 2024-03-01
Payer: OTHER GOVERNMENT

## 2024-03-01 DIAGNOSIS — E66.3 OVERWEIGHT: ICD-10-CM

## 2024-03-01 PROCEDURE — 99213 OFFICE O/P EST LOW 20 MIN: CPT | Performed by: FAMILY MEDICINE

## 2024-03-01 RX ORDER — PHENTERMINE HYDROCHLORIDE 37.5 MG/1
37.5 TABLET ORAL
Qty: 30 TABLET | Refills: 0 | Status: SHIPPED | OUTPATIENT
Start: 2024-03-01 | End: 2024-03-31

## 2024-03-12 NOTE — PROGRESS NOTES
well-nourished [x] No apparent distress      [] Abnormal -     Mental status: [x] Alert and awake  [x] Oriented to person/place/time [x] Able to follow commands    [] Abnormal -     Eyes:   EOM    [x]  Normal    [] Abnormal -   Sclera  [x]  Normal    [] Abnormal -          Discharge [x]  None visible   [] Abnormal -     HENT: [x] Normocephalic, atraumatic  [] Abnormal -   [x] Mouth/Throat: Mucous membranes are moist    External Ears [x] Normal  [] Abnormal -    Neck: [x] No visualized mass [] Abnormal -     Pulmonary/Chest: [x] Respiratory effort normal   [x] No visualized signs of difficulty breathing or respiratory distress        [] Abnormal -      Musculoskeletal:   [x] Normal gait with no signs of ataxia         [x] Normal range of motion of neck        [] Abnormal -     Neurological:        [x] No Facial Asymmetry (Cranial nerve 7 motor function) (limited exam due to video visit)          [x] No gaze palsy        [] Abnormal -          Skin:        [x] No significant exanthematous lesions or discoloration noted on facial skin         [] Abnormal -            Psychiatric:       [x] Normal Affect [] Abnormal -        [x] No Hallucinations    Other pertinent observable physical exam findings:-             --Candelario Cabello MD

## 2024-03-13 DIAGNOSIS — E66.3 OVERWEIGHT: ICD-10-CM

## 2024-03-13 RX ORDER — PHENTERMINE HYDROCHLORIDE 37.5 MG/1
37.5 TABLET ORAL
Qty: 30 TABLET | Refills: 0 | OUTPATIENT
Start: 2024-03-13 | End: 2024-04-12

## 2024-03-13 NOTE — TELEPHONE ENCOUNTER
Last Office Visit  -  3/1/24  Next Office Visit  -  n/a    Last Filled  -  3/1/24  Last UDS -  10/16/19  Contract -  n/a

## 2024-03-22 DIAGNOSIS — F90.9 ATTENTION DEFICIT HYPERACTIVITY DISORDER (ADHD), UNSPECIFIED ADHD TYPE: ICD-10-CM

## 2024-03-22 DIAGNOSIS — F17.210 CIGARETTE NICOTINE DEPENDENCE WITHOUT COMPLICATION: ICD-10-CM

## 2024-03-22 RX ORDER — DEXTROAMPHETAMINE SACCHARATE, AMPHETAMINE ASPARTATE, DEXTROAMPHETAMINE SULFATE AND AMPHETAMINE SULFATE 5; 5; 5; 5 MG/1; MG/1; MG/1; MG/1
30 TABLET ORAL DAILY
Qty: 45 TABLET | Refills: 0 | Status: SHIPPED | OUTPATIENT
Start: 2024-03-22 | End: 2024-04-09 | Stop reason: SDUPTHER

## 2024-03-22 RX ORDER — VARENICLINE TARTRATE 0.5 (11)-1
KIT ORAL
Qty: 53 EACH | Refills: 0 | Status: SHIPPED | OUTPATIENT
Start: 2024-03-22 | End: 2024-03-26 | Stop reason: ALTCHOICE

## 2024-03-22 NOTE — TELEPHONE ENCOUNTER
Last Office Visit  -  3/1/24  Next Office Visit  -  n/a    Last Filled  -  2/27/24  Last UDS -    Contract -

## 2024-03-22 NOTE — TELEPHONE ENCOUNTER
Last Office Visit  -  3/1/24  Next Office Visit  -  n/a    Last Filled  -  2/19/24  Last UDS -  10/16/19  Contract -

## 2024-03-26 ENCOUNTER — TELEPHONE (OUTPATIENT)
Dept: FAMILY MEDICINE CLINIC | Age: 44
End: 2024-03-26

## 2024-03-26 RX ORDER — VARENICLINE TARTRATE 1 MG/1
1 TABLET, FILM COATED ORAL 2 TIMES DAILY
Qty: 60 TABLET | Refills: 5 | Status: SHIPPED | OUTPATIENT
Start: 2024-03-26

## 2024-03-26 NOTE — TELEPHONE ENCOUNTER
[facility] contacted the office in regards to patients RX for Varenicline Start Month pack, please clarify the directions, please advise XYDO [phone]

## 2024-03-29 ENCOUNTER — TELEPHONE (OUTPATIENT)
Dept: SURGERY | Age: 44
End: 2024-03-29

## 2024-04-01 ENCOUNTER — PREP FOR PROCEDURE (OUTPATIENT)
Dept: SURGERY | Age: 44
End: 2024-04-01

## 2024-04-01 DIAGNOSIS — Z91.89 AT HIGH RISK FOR BREAST CANCER: ICD-10-CM

## 2024-04-01 RX ORDER — SODIUM CHLORIDE 9 MG/ML
INJECTION, SOLUTION INTRAVENOUS CONTINUOUS
Status: CANCELLED | OUTPATIENT
Start: 2024-04-25

## 2024-04-01 RX ORDER — SODIUM CHLORIDE 0.9 % (FLUSH) 0.9 %
5-40 SYRINGE (ML) INJECTION EVERY 12 HOURS SCHEDULED
Status: CANCELLED | OUTPATIENT
Start: 2024-04-25

## 2024-04-01 RX ORDER — SODIUM CHLORIDE 9 MG/ML
INJECTION, SOLUTION INTRAVENOUS PRN
Status: CANCELLED | OUTPATIENT
Start: 2024-04-25

## 2024-04-01 RX ORDER — SODIUM CHLORIDE 0.9 % (FLUSH) 0.9 %
5-40 SYRINGE (ML) INJECTION PRN
Status: CANCELLED | OUTPATIENT
Start: 2024-04-25

## 2024-04-05 PROBLEM — Z91.89 AT HIGH RISK FOR BREAST CANCER: Status: ACTIVE | Noted: 2024-04-01

## 2024-04-05 NOTE — TELEPHONE ENCOUNTER
I received a fax from [employer] dated 4-2-2024. I will scan the fax into Epic under the media tab.     APPROVED  Date Range 3- to 9-  Auth/Order # [id number]  [facility]  Tissue Expander Placement Breast Reconst, Insertion Breast Implant Same Day Of Mas, Acellular Derm Matrix Implt       I spoke with the patient at the home number listed to provide an insurance update.      I sent an email to Dr. Main to discuss OR time.      I will leave this phone note open.      
I received a surgery letter from Dr. Whitehead.     I faxed a [facility] Patient Referral Authorization Form and clinicals today. I will scan the information that I faxed and the fax success into Epic under the media tab.    I spoke with the patient at the home number listed to provide an insurance update.     I sent an email to Dr. Main to discuss OR time.     I will leave this phone note open.   
I spoke with the patient at the home number listed. The patient is now scheduled for surgery with  on 4-.     The patient is aware of H&P.    The patient is scheduled for her post op appointment 5-2-2024.     I will submit the case request to [employer] today.     The patient declined education.     I will fax the [employer] order to [employer] today. I will scan the order and the fax success into Epic under the media tab.     I will email the surgery information and instructions to the patient today at [email or web address].     I will close this phone note.     
The patient was in the office to see Dr. Whitehead 1-.     PLAN: Discussed options with Kaylin. Would benefit from immediate B DTI reconstruction with NSM IMF approach. However, she needs complete nicotine abstinence. Once completed, will work with Dr. Main and schedule.     The patient had a nicotine test on 3-. The results are in Epic. Please review and provide a surgery letter if appropriate.     I will route to MD. [place] primary and only.  subscriber. As soon as possible.     
(2) more than 100 beats/min

## 2024-04-09 ENCOUNTER — OFFICE VISIT (OUTPATIENT)
Dept: FAMILY MEDICINE CLINIC | Age: 44
End: 2024-04-09
Payer: OTHER GOVERNMENT

## 2024-04-09 VITALS
SYSTOLIC BLOOD PRESSURE: 116 MMHG | HEART RATE: 88 BPM | DIASTOLIC BLOOD PRESSURE: 84 MMHG | BODY MASS INDEX: 25.51 KG/M2 | WEIGHT: 144 LBS | OXYGEN SATURATION: 98 %

## 2024-04-09 DIAGNOSIS — Z01.818 PRE-OP EXAM: ICD-10-CM

## 2024-04-09 DIAGNOSIS — Z91.89 AT HIGH RISK FOR BREAST CANCER: ICD-10-CM

## 2024-04-09 DIAGNOSIS — F90.9 ATTENTION DEFICIT HYPERACTIVITY DISORDER (ADHD), UNSPECIFIED ADHD TYPE: Primary | ICD-10-CM

## 2024-04-09 PROCEDURE — 93000 ELECTROCARDIOGRAM COMPLETE: CPT | Performed by: NURSE PRACTITIONER

## 2024-04-09 PROCEDURE — 99214 OFFICE O/P EST MOD 30 MIN: CPT | Performed by: NURSE PRACTITIONER

## 2024-04-09 RX ORDER — DEXTROAMPHETAMINE SACCHARATE, AMPHETAMINE ASPARTATE, DEXTROAMPHETAMINE SULFATE AND AMPHETAMINE SULFATE 5; 5; 5; 5 MG/1; MG/1; MG/1; MG/1
30 TABLET ORAL DAILY
Qty: 45 TABLET | Refills: 0 | Status: SHIPPED | OUTPATIENT
Start: 2024-04-21 | End: 2024-05-21

## 2024-04-09 NOTE — H&P (VIEW-ONLY)
Patient: Kaylin Sanchez is a 43 y.o. female who presents today with the following Chief Complaint(s):  Chief Complaint   Patient presents with    Pre-op Exam     Bilateral breast reconstruction with direct to implant reconstruction, Dr. Whitehead, 4/25/24, [facility]          HPI  ADHD: adderall 20mg tablet- takes 1.5 tablets. States the ones she got from [facility] don't make her feel right- she felt like it helped [facility]. It is not time to fill so she will try taking just 1/2 tablet of the 20mg she has right now.     Current Outpatient Medications   Medication Sig Dispense Refill    [START ON 4/21/2024] amphetamine-dextroamphetamine (ADDERALL, 20MG,) 20 MG tablet Take 1.5 tablets by mouth daily for 30 days. Max Daily Amount: 30 mg 45 tablet 0    varenicline (CHANTIX) 1 MG tablet Take 1 tablet by mouth 2 times daily 60 tablet 5    valACYclovir (VALTREX) 1 g tablet TAKE TWO TABLETS BY MOUTH EVERY 12 HOURS FOR 1 DAY 4 tablet 1    oxyCODONE-acetaminophen (PERCOCET) 5-325 MG per tablet TAKE 1 TABLET BY MOUTH ONCE DAILY AS NEEDED      omeprazole (PRILOSEC) 20 MG delayed release capsule Take 1 capsule by mouth daily 90 capsule 3    hydrOXYzine pamoate (VISTARIL) 25 MG capsule TAKE 1 CAPSULE FOUR TIMES A DAY AS NEEDED FOR ANXIETY 60 capsule 23    fluticasone (FLONASE) 50 MCG/ACT nasal spray USE 1 SPRAY NASALLY DAILY 48 g 5    loratadine-pseudoephedrine (CLARITIN-D 24 HOUR)  MG per tablet Take 1 tablet by mouth daily 90 tablet 1    Multiple Vitamin (MULTI-VITAMIN PO) Take 1 tablet by mouth daily.        Rimegepant Sulfate (NURTEC) 75 MG TBDP Take by mouth as needed       No current facility-administered medications for this visit.       Patient's past medical history, surgical history, family history, medications,  andallergies  were all reviewed and updated as appropriate today.      Review of Systems   Psychiatric/Behavioral:  Positive for decreased concentration.          Physical Exam  Vitals

## 2024-04-09 NOTE — PROGRESS NOTES
[facility]  [phone]  Fax: [phone]   Pre-operative History and Physical      DIAGNOSIS:  high risk for breast cancer    PROCEDURE: bilateral mastectomy,  Bilateral breast reconstruction with direct to implant reconstruction with Alloderm Possible stage 1 tissue expander placement with Alloderm       History Obtained From:  patient, electronic medical record    HISTORY OF PRESENT ILLNESS:    The patient is a 43 y.o. female with significant past medical history of high risk for breast cancer. I am seeing this patient for preop consultation for Dr. Ky Whitehead and Dr. 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  was good, repeat recommended yearly at that point.    HNP (herniated nucleus pulposus), cervical     Medial meniscus tear     left knee    Menorrhagia     Migraine     TMJ (dislocation of temporomandibular joint)     Tobacco use     still smoking     Past Surgical History:   Procedure Laterality Date    BREAST BIOPSY      BREAST SURGERY Left 07/05/2022    LEFT BREAST EXCISIONAL BIOPSY performed by Haritha Duckworth MD at [facility] OR    CERVICAL DISCECTOMY      revision with bone graft    CERVICAL SPINE SURGERY  04/27/2018    2 discs removed, cadaver put in and fused x2    ENDOMETRIAL ABLATION  04/19/2013    Novasure, D&C, Diagnostic Hysteroscopy    KNEE ARTHROSCOPY Right 11/19/2013    KNEE ARTHROSCOPY Right     KNEE SURGERY Left     KNEE SURGERY Right 02/26/2013    LAPAROSCOPY      LEEP      OTHER SURGICAL HISTORY  05/22/2013    cystoscopy, trans vaginal taping    OTHER SURGICAL HISTORY  10/16/2013    Transvaginal taping take down    SHOULDER SURGERY Right 2019    UPPER GASTROINTESTINAL ENDOSCOPY N/A 03/27/2019    EGD DILATION SAVORY performed by Jeff Velázquez MD at [facility]

## 2024-04-09 NOTE — PROGRESS NOTES
Patient: Kaylin Sanchez is a 43 y.o. female who presents today with the following Chief Complaint(s):  Chief Complaint   Patient presents with    Pre-op Exam     Bilateral breast reconstruction with direct to implant reconstruction, Dr. Whitehead, 4/25/24, [facility]          HPI  ADHD: adderall 20mg tablet- takes 1.5 tablets. States the ones she got from [facility] don't make her feel right- she felt like it helped when she took the [facility]. It is not time to fill so she will try taking just 1/2 tablet of the 20mg she has right now.     Current Outpatient Medications   Medication Sig Dispense Refill    [START ON 4/21/2024] amphetamine-dextroamphetamine (ADDERALL, 20MG,) 20 MG tablet Take 1.5 tablets by mouth daily for 30 days. Max Daily Amount: 30 mg 45 tablet 0    varenicline (CHANTIX) 1 MG tablet Take 1 tablet by mouth 2 times daily 60 tablet 5    valACYclovir (VALTREX) 1 g tablet TAKE TWO TABLETS BY MOUTH EVERY 12 HOURS FOR 1 DAY 4 tablet 1    oxyCODONE-acetaminophen (PERCOCET) 5-325 MG per tablet TAKE 1 TABLET BY MOUTH ONCE DAILY AS NEEDED      omeprazole (PRILOSEC) 20 MG delayed release capsule Take 1 capsule by mouth daily 90 capsule 3    hydrOXYzine pamoate (VISTARIL) 25 MG capsule TAKE 1 CAPSULE FOUR TIMES A DAY AS NEEDED FOR ANXIETY 60 capsule 23    fluticasone (FLONASE) 50 MCG/ACT nasal spray USE 1 SPRAY NASALLY DAILY 48 g 5    loratadine-pseudoephedrine (CLARITIN-D 24 HOUR)  MG per tablet Take 1 tablet by mouth daily 90 tablet 1    Multiple Vitamin (MULTI-VITAMIN PO) Take 1 tablet by mouth daily.        Rimegepant Sulfate (NURTEC) 75 MG TBDP Take by mouth as needed       No current facility-administered medications for this visit.       Patient's past medical history, surgical history, family history, medications,  andallergies  were all reviewed and updated as appropriate today.      Review of Systems   Psychiatric/Behavioral:  Positive for decreased concentration.          Physical Exam  Vitals

## 2024-04-15 ENCOUNTER — TELEPHONE (OUTPATIENT)
Dept: SURGERY | Age: 44
End: 2024-04-15

## 2024-04-15 NOTE — TELEPHONE ENCOUNTER
Patient scheduled for Bilateral breast reconstruction with direct to implant reconstruction with Alloderm Bilateral -General Possible stage 1 tissue expander placement with Alloderm to be completed on 04/25/2024.     Patient states her employer informed her the leave of absence paperwork was not received. Patient is calling to check on the status of the paperwork.     Patient can be reached at [phone].

## 2024-04-15 NOTE — TELEPHONE ENCOUNTER
Spoke with patient and let her know we just received her paperwork this morning 04/15 and that I would be doing it later this after or tomorrow morning and faxing it she understood

## 2024-04-18 NOTE — PROGRESS NOTES
[facility] PRE-SURGICAL TESTING INSTRUCTIONS                      PRIOR TO PROCEDURE DATE:    1. PLEASE FOLLOW ANY INSTRUCTIONS GIVEN TO YOU PER YOUR SURGEON.      2. Arrange for someone to drive you home and be with you for the first 24 hours after discharge for your safety after your procedure for which you received sedation. Ensure it is someone we can share information with regarding your discharge.     NOTE: At this time ONLY 2 ADULTS may accompany you   One person ENCOURAGED to stay at hospital entire time if outpatient surgery      3. You must contact your surgeon for instructions IF:  You are taking any blood thinners, aspirin, anti-inflammatory or vitamins.  There is a change in your physical condition such as a cold, fever, rash, cuts, sores, or any other infection, especially near your surgical site.    4. Do not drink alcohol the day before or day of your procedure.  Do not use any recreational marijuana at least 24 hours or street drugs (heroin, cocaine) at minimum 5 days prior to your procedure.     5. A Pre-Surgical History and Physical MUST be completed WITHIN 30 DAYS OR LESS prior to your procedure.by your Physician or an Urgent Care        THE DAY OF YOUR PROCEDURE:  1.  Follow instructions for ARRIVAL TIME as DIRECTED BY YOUR SURGEON.     2. Enter the MAIN entrance from [facility] and follow the signs to the free Parking Garage or  Parking (offered free of charge 7 am-5pm).      3. Enter the Main Entrance of the hospital (do not enter from the lower level of the parking garage). Upon entrance, check in with the  at the surgical information desk on your LEFT.   Bring your insurance card and photo ID to register      4. DO NOT EAT ANYTHING 8 hours prior to arrival for surgery.  You may have up to 8 ounces of water 4 hours prior to your arrival for surgery.   NOTE: ALL Gastric, Bariatric & Bowel surgery patients - you MUST follow your surgeon's instructions regarding  in surgery.      9. If you use a CPAP, please bring it with you on the day of your procedure.    10. If you use oxygen at home, please bring your oxygen tank with you to hospital..     11. We recommend that valuable personal belongings such as cash, cell phones, e-tablets, or jewelry, be left at home during your stay. The hospital will not be responsible for valuables that are not secured in the hospital safe. However, if your insurance requires a co-pay, you may want to bring a method of payment, i.e., Check or credit card, if you wish to pay your co-pay the day of surgery.      12. If you are to stay overnight, you may bring a bag with personal items. Please have any large items you may need brought in by your family after your arrival to your hospital room.    13. If you have a Living Will or Durable Power of , please bring a copy on the day of your procedure.     How we keep you safe and work to prevent surgical site infections:   1. Health care workers should always check your ID bracelet to verify your name and birth date. You will be asked many times to state your name, date of birth, and allergies.    2. Health care workers should always clean their hands with soap or alcohol gel before providing care to you. It is okay to ask anyone if they cleaned their hands before they touch you.    3. You will be actively involved in verifying the type of procedure you are having and ensuring the correct surgical site. This will be confirmed multiple times prior to your procedure. Do NOT marti your surgery site UNLESS instructed to by your surgeon.     4. When you are in the operating room, your surgical site will be cleansed with a special soap, and in most cases, you will be given an antibiotic before the surgery begins.      What to expect AFTER your procedure?  1. Immediately following your procedure, your will be taken to the PACU for the first phase of your recovery.  Your nurse will help you recover from  History of appendectomy  1972  Surgery, elective  right breast biopsy- 2004 benign H/O breast surgery  left lumpectomy  History of appendectomy  1972  Surgery, elective  right breast biopsy- 2004 benign

## 2024-04-25 ENCOUNTER — ANESTHESIA (OUTPATIENT)
Dept: OPERATING ROOM | Age: 44
End: 2024-04-25
Payer: OTHER GOVERNMENT

## 2024-04-25 ENCOUNTER — ANESTHESIA EVENT (OUTPATIENT)
Dept: OPERATING ROOM | Age: 44
End: 2024-04-25
Payer: OTHER GOVERNMENT

## 2024-04-25 ENCOUNTER — HOSPITAL ENCOUNTER (OUTPATIENT)
Age: 44
Setting detail: OUTPATIENT SURGERY
Discharge: HOME | End: 2024-04-25
Attending: SURGERY | Admitting: SURGERY
Payer: OTHER GOVERNMENT

## 2024-04-25 VITALS
HEIGHT: 63 IN | HEART RATE: 78 BPM | SYSTOLIC BLOOD PRESSURE: 121 MMHG | DIASTOLIC BLOOD PRESSURE: 76 MMHG | WEIGHT: 144 LBS | TEMPERATURE: 97.7 F | BODY MASS INDEX: 25.52 KG/M2 | RESPIRATION RATE: 13 BRPM | OXYGEN SATURATION: 98 %

## 2024-04-25 DIAGNOSIS — G89.18 POSTOPERATIVE PAIN: Primary | ICD-10-CM

## 2024-04-25 DIAGNOSIS — Z91.89 AT HIGH RISK FOR BREAST CANCER: ICD-10-CM

## 2024-04-25 LAB
HCG UR QL: NEGATIVE
HCG UR QL: NEGATIVE

## 2024-04-25 PROCEDURE — 2709999900 HC NON-CHARGEABLE SUPPLY: Performed by: SURGERY

## 2024-04-25 PROCEDURE — 6360000002 HC RX W HCPCS: Performed by: NURSE ANESTHETIST, CERTIFIED REGISTERED

## 2024-04-25 PROCEDURE — 6370000000 HC RX 637 (ALT 250 FOR IP): Performed by: ANESTHESIOLOGY

## 2024-04-25 PROCEDURE — 7100000010 HC PHASE II RECOVERY - FIRST 15 MIN: Performed by: SURGERY

## 2024-04-25 PROCEDURE — 97605 NEG PRS WND THER DME<=50SQCM: CPT | Performed by: SURGERY

## 2024-04-25 PROCEDURE — A4217 STERILE WATER/SALINE, 500 ML: HCPCS | Performed by: SURGERY

## 2024-04-25 PROCEDURE — C9290 INJ, BUPIVACAINE LIPOSOME: HCPCS | Performed by: SURGERY

## 2024-04-25 PROCEDURE — 2580000003 HC RX 258: Performed by: NURSE ANESTHETIST, CERTIFIED REGISTERED

## 2024-04-25 PROCEDURE — C1729 CATH, DRAINAGE: HCPCS | Performed by: SURGERY

## 2024-04-25 PROCEDURE — 88307 TISSUE EXAM BY PATHOLOGIST: CPT

## 2024-04-25 PROCEDURE — 2500000003 HC RX 250 WO HCPCS: Performed by: NURSE ANESTHETIST, CERTIFIED REGISTERED

## 2024-04-25 PROCEDURE — 3600000004 HC SURGERY LEVEL 4 BASE: Performed by: SURGERY

## 2024-04-25 PROCEDURE — 2580000003 HC RX 258: Performed by: ANESTHESIOLOGY

## 2024-04-25 PROCEDURE — 7100000011 HC PHASE II RECOVERY - ADDTL 15 MIN: Performed by: SURGERY

## 2024-04-25 PROCEDURE — 84703 CHORIONIC GONADOTROPIN ASSAY: CPT

## 2024-04-25 PROCEDURE — 2580000003 HC RX 258: Performed by: SURGERY

## 2024-04-25 PROCEDURE — 2720000010 HC SURG SUPPLY STERILE: Performed by: SURGERY

## 2024-04-25 PROCEDURE — 15777 ACELLULAR DERM MATRIX IMPLT: CPT | Performed by: SURGERY

## 2024-04-25 PROCEDURE — 15860 IV NJX TST VASC FLO FLAP/GRF: CPT | Performed by: SURGERY

## 2024-04-25 PROCEDURE — 6360000002 HC RX W HCPCS: Performed by: SURGERY

## 2024-04-25 PROCEDURE — 19340 INSJ BREAST IMPLT SM D MAST: CPT | Performed by: SURGERY

## 2024-04-25 PROCEDURE — 6360000002 HC RX W HCPCS: Performed by: ANESTHESIOLOGY

## 2024-04-25 PROCEDURE — 3700000001 HC ADD 15 MINUTES (ANESTHESIA): Performed by: SURGERY

## 2024-04-25 PROCEDURE — 7100000000 HC PACU RECOVERY - FIRST 15 MIN: Performed by: SURGERY

## 2024-04-25 PROCEDURE — L8000 MASTECTOMY BRA: HCPCS | Performed by: SURGERY

## 2024-04-25 PROCEDURE — C1789 PROSTHESIS, BREAST, IMP: HCPCS | Performed by: SURGERY

## 2024-04-25 PROCEDURE — 3700000000 HC ANESTHESIA ATTENDED CARE: Performed by: SURGERY

## 2024-04-25 PROCEDURE — 2500000003 HC RX 250 WO HCPCS: Performed by: SURGERY

## 2024-04-25 PROCEDURE — 3600000014 HC SURGERY LEVEL 4 ADDTL 15MIN: Performed by: SURGERY

## 2024-04-25 PROCEDURE — 7100000001 HC PACU RECOVERY - ADDTL 15 MIN: Performed by: SURGERY

## 2024-04-25 DEVICE — GRAFT HUM TISS THK2-2.8MM 164 SQ CM THCK L PERF CNTOUR ACELLULAR DERM: Type: IMPLANTABLE DEVICE | Site: BREAST | Status: FUNCTIONAL

## 2024-04-25 DEVICE — SMOOTH MODERATE PLUS PROFILE, 340CC  SMOOTH ROUND SILICONE
Type: IMPLANTABLE DEVICE | Site: BREAST | Status: FUNCTIONAL
Brand: MENTOR MEMORYGEL BOOST BREAST IMPLANT

## 2024-04-25 RX ORDER — DOCUSATE SODIUM 100 MG/1
100 CAPSULE, LIQUID FILLED ORAL 2 TIMES DAILY
Qty: 30 CAPSULE | Refills: 0 | Status: SHIPPED | OUTPATIENT
Start: 2024-04-25 | End: 2024-05-09

## 2024-04-25 RX ORDER — SODIUM CHLORIDE 9 MG/ML
INJECTION, SOLUTION INTRAVENOUS PRN
Status: DISCONTINUED | OUTPATIENT
Start: 2024-04-25 | End: 2024-04-25 | Stop reason: HOSPADM

## 2024-04-25 RX ORDER — CLINDAMYCIN HYDROCHLORIDE 300 MG/1
300 CAPSULE ORAL 4 TIMES DAILY
Qty: 56 CAPSULE | Refills: 0 | Status: SHIPPED | OUTPATIENT
Start: 2024-04-25 | End: 2024-05-09

## 2024-04-25 RX ORDER — SODIUM CHLORIDE 0.9 % (FLUSH) 0.9 %
5-40 SYRINGE (ML) INJECTION PRN
Status: DISCONTINUED | OUTPATIENT
Start: 2024-04-25 | End: 2024-04-25 | Stop reason: HOSPADM

## 2024-04-25 RX ORDER — ROCURONIUM BROMIDE 10 MG/ML
INJECTION, SOLUTION INTRAVENOUS PRN
Status: DISCONTINUED | OUTPATIENT
Start: 2024-04-25 | End: 2024-04-25 | Stop reason: SDUPTHER

## 2024-04-25 RX ORDER — MIDAZOLAM HYDROCHLORIDE 1 MG/ML
INJECTION INTRAMUSCULAR; INTRAVENOUS PRN
Status: DISCONTINUED | OUTPATIENT
Start: 2024-04-25 | End: 2024-04-25 | Stop reason: SDUPTHER

## 2024-04-25 RX ORDER — METHOCARBAMOL 100 MG/ML
INJECTION, SOLUTION INTRAMUSCULAR; INTRAVENOUS PRN
Status: DISCONTINUED | OUTPATIENT
Start: 2024-04-25 | End: 2024-04-25 | Stop reason: SDUPTHER

## 2024-04-25 RX ORDER — ONDANSETRON 2 MG/ML
4 INJECTION INTRAMUSCULAR; INTRAVENOUS
Status: COMPLETED | OUTPATIENT
Start: 2024-04-25 | End: 2024-04-25

## 2024-04-25 RX ORDER — SODIUM CHLORIDE 0.9 % (FLUSH) 0.9 %
5-40 SYRINGE (ML) INJECTION EVERY 12 HOURS SCHEDULED
Status: DISCONTINUED | OUTPATIENT
Start: 2024-04-25 | End: 2024-04-25 | Stop reason: HOSPADM

## 2024-04-25 RX ORDER — HYDRALAZINE HYDROCHLORIDE 20 MG/ML
10 INJECTION INTRAMUSCULAR; INTRAVENOUS
Status: DISCONTINUED | OUTPATIENT
Start: 2024-04-25 | End: 2024-04-25 | Stop reason: HOSPADM

## 2024-04-25 RX ORDER — PROPOFOL 10 MG/ML
INJECTION, EMULSION INTRAVENOUS PRN
Status: DISCONTINUED | OUTPATIENT
Start: 2024-04-25 | End: 2024-04-25 | Stop reason: SDUPTHER

## 2024-04-25 RX ORDER — FENTANYL CITRATE 50 UG/ML
25 INJECTION, SOLUTION INTRAMUSCULAR; INTRAVENOUS EVERY 5 MIN PRN
Status: DISCONTINUED | OUTPATIENT
Start: 2024-04-25 | End: 2024-04-25 | Stop reason: HOSPADM

## 2024-04-25 RX ORDER — GLYCOPYRROLATE 0.2 MG/ML
INJECTION INTRAMUSCULAR; INTRAVENOUS PRN
Status: DISCONTINUED | OUTPATIENT
Start: 2024-04-25 | End: 2024-04-25 | Stop reason: SDUPTHER

## 2024-04-25 RX ORDER — CALCIUM CHLORIDE 100 MG/ML
INJECTION INTRAVENOUS; INTRAVENTRICULAR PRN
Status: DISCONTINUED | OUTPATIENT
Start: 2024-04-25 | End: 2024-04-25 | Stop reason: SDUPTHER

## 2024-04-25 RX ORDER — LABETALOL HYDROCHLORIDE 5 MG/ML
10 INJECTION, SOLUTION INTRAVENOUS
Status: DISCONTINUED | OUTPATIENT
Start: 2024-04-25 | End: 2024-04-25 | Stop reason: HOSPADM

## 2024-04-25 RX ORDER — OXYCODONE HYDROCHLORIDE AND ACETAMINOPHEN 5; 325 MG/1; MG/1
1 TABLET ORAL EVERY 6 HOURS PRN
Qty: 28 TABLET | Refills: 0 | Status: SHIPPED | OUTPATIENT
Start: 2024-04-25 | End: 2024-05-02

## 2024-04-25 RX ORDER — FENTANYL CITRATE 50 UG/ML
INJECTION, SOLUTION INTRAMUSCULAR; INTRAVENOUS PRN
Status: DISCONTINUED | OUTPATIENT
Start: 2024-04-25 | End: 2024-04-25 | Stop reason: SDUPTHER

## 2024-04-25 RX ORDER — MEPERIDINE HYDROCHLORIDE 25 MG/ML
12.5 INJECTION INTRAMUSCULAR; INTRAVENOUS; SUBCUTANEOUS EVERY 5 MIN PRN
Status: DISCONTINUED | OUTPATIENT
Start: 2024-04-25 | End: 2024-04-25 | Stop reason: HOSPADM

## 2024-04-25 RX ORDER — SODIUM CHLORIDE 9 MG/ML
INJECTION, SOLUTION INTRAVENOUS CONTINUOUS
Status: DISCONTINUED | OUTPATIENT
Start: 2024-04-25 | End: 2024-04-25 | Stop reason: HOSPADM

## 2024-04-25 RX ORDER — HYDROMORPHONE HYDROCHLORIDE 1 MG/ML
0.5 INJECTION, SOLUTION INTRAMUSCULAR; INTRAVENOUS; SUBCUTANEOUS EVERY 5 MIN PRN
Status: DISCONTINUED | OUTPATIENT
Start: 2024-04-25 | End: 2024-04-25 | Stop reason: HOSPADM

## 2024-04-25 RX ORDER — SODIUM CHLORIDE, SODIUM LACTATE, POTASSIUM CHLORIDE, CALCIUM CHLORIDE 600; 310; 30; 20 MG/100ML; MG/100ML; MG/100ML; MG/100ML
INJECTION, SOLUTION INTRAVENOUS CONTINUOUS
Status: DISCONTINUED | OUTPATIENT
Start: 2024-04-25 | End: 2024-04-25 | Stop reason: HOSPADM

## 2024-04-25 RX ORDER — LIDOCAINE HYDROCHLORIDE 20 MG/ML
INJECTION, SOLUTION INFILTRATION; PERINEURAL PRN
Status: DISCONTINUED | OUTPATIENT
Start: 2024-04-25 | End: 2024-04-25 | Stop reason: SDUPTHER

## 2024-04-25 RX ORDER — OXYCODONE HYDROCHLORIDE 5 MG/1
5 TABLET ORAL PRN
Status: COMPLETED | OUTPATIENT
Start: 2024-04-25 | End: 2024-04-25

## 2024-04-25 RX ORDER — ACETAMINOPHEN 325 MG/1
650 TABLET ORAL
Status: DISCONTINUED | OUTPATIENT
Start: 2024-04-25 | End: 2024-04-25 | Stop reason: HOSPADM

## 2024-04-25 RX ORDER — IPRATROPIUM BROMIDE AND ALBUTEROL SULFATE 2.5; .5 MG/3ML; MG/3ML
1 SOLUTION RESPIRATORY (INHALATION)
Status: DISCONTINUED | OUTPATIENT
Start: 2024-04-25 | End: 2024-04-25 | Stop reason: HOSPADM

## 2024-04-25 RX ORDER — PROCHLORPERAZINE EDISYLATE 5 MG/ML
5 INJECTION INTRAMUSCULAR; INTRAVENOUS
Status: DISCONTINUED | OUTPATIENT
Start: 2024-04-25 | End: 2024-04-25 | Stop reason: HOSPADM

## 2024-04-25 RX ORDER — OXYCODONE HYDROCHLORIDE 5 MG/1
10 TABLET ORAL PRN
Status: COMPLETED | OUTPATIENT
Start: 2024-04-25 | End: 2024-04-25

## 2024-04-25 RX ORDER — ONDANSETRON 2 MG/ML
INJECTION INTRAMUSCULAR; INTRAVENOUS PRN
Status: DISCONTINUED | OUTPATIENT
Start: 2024-04-25 | End: 2024-04-25 | Stop reason: SDUPTHER

## 2024-04-25 RX ORDER — SODIUM CHLORIDE, SODIUM LACTATE, POTASSIUM CHLORIDE, CALCIUM CHLORIDE 600; 310; 30; 20 MG/100ML; MG/100ML; MG/100ML; MG/100ML
INJECTION, SOLUTION INTRAVENOUS CONTINUOUS PRN
Status: DISCONTINUED | OUTPATIENT
Start: 2024-04-25 | End: 2024-04-25 | Stop reason: SDUPTHER

## 2024-04-25 RX ORDER — DEXMEDETOMIDINE HYDROCHLORIDE 100 UG/ML
INJECTION, SOLUTION INTRAVENOUS PRN
Status: DISCONTINUED | OUTPATIENT
Start: 2024-04-25 | End: 2024-04-25 | Stop reason: SDUPTHER

## 2024-04-25 RX ORDER — HYDROMORPHONE HYDROCHLORIDE 2 MG/ML
INJECTION, SOLUTION INTRAMUSCULAR; INTRAVENOUS; SUBCUTANEOUS PRN
Status: DISCONTINUED | OUTPATIENT
Start: 2024-04-25 | End: 2024-04-25 | Stop reason: SDUPTHER

## 2024-04-25 RX ORDER — VANCOMYCIN HYDROCHLORIDE 1 G/20ML
INJECTION, POWDER, LYOPHILIZED, FOR SOLUTION INTRAVENOUS PRN
Status: DISCONTINUED | OUTPATIENT
Start: 2024-04-25 | End: 2024-04-25 | Stop reason: SDUPTHER

## 2024-04-25 RX ORDER — NALOXONE HYDROCHLORIDE 0.4 MG/ML
INJECTION, SOLUTION INTRAMUSCULAR; INTRAVENOUS; SUBCUTANEOUS PRN
Status: DISCONTINUED | OUTPATIENT
Start: 2024-04-25 | End: 2024-04-25 | Stop reason: HOSPADM

## 2024-04-25 RX ADMIN — DEXMEDETOMIDINE HYDROCHLORIDE 4 MCG: 100 INJECTION, SOLUTION INTRAVENOUS at 07:27

## 2024-04-25 RX ADMIN — SODIUM CHLORIDE, SODIUM LACTATE, POTASSIUM CHLORIDE, AND CALCIUM CHLORIDE: .6; .31; .03; .02 INJECTION, SOLUTION INTRAVENOUS at 07:55

## 2024-04-25 RX ADMIN — SODIUM CHLORIDE, SODIUM LACTATE, POTASSIUM CHLORIDE, AND CALCIUM CHLORIDE: .6; .31; .03; .02 INJECTION, SOLUTION INTRAVENOUS at 07:22

## 2024-04-25 RX ADMIN — ONDANSETRON 4 MG: 2 INJECTION INTRAMUSCULAR; INTRAVENOUS at 12:58

## 2024-04-25 RX ADMIN — TRANEXAMIC ACID 1000 MG: 100 INJECTION, SOLUTION INTRAVENOUS at 07:48

## 2024-04-25 RX ADMIN — GLYCOPYRROLATE 0.2 MG: 0.2 INJECTION INTRAMUSCULAR; INTRAVENOUS at 09:31

## 2024-04-25 RX ADMIN — FENTANYL CITRATE 50 MCG: 50 INJECTION, SOLUTION INTRAMUSCULAR; INTRAVENOUS at 07:27

## 2024-04-25 RX ADMIN — METHOCARBAMOL 1000 MG: 100 INJECTION, SOLUTION INTRAMUSCULAR; INTRAVENOUS at 10:27

## 2024-04-25 RX ADMIN — CALCIUM CHLORIDE 1 G: 100 INJECTION, SOLUTION INTRAVENOUS at 09:02

## 2024-04-25 RX ADMIN — CALCIUM CHLORIDE 0.2 G: 100 INJECTION, SOLUTION INTRAVENOUS at 08:05

## 2024-04-25 RX ADMIN — DEXMEDETOMIDINE HYDROCHLORIDE 6 MCG: 100 INJECTION, SOLUTION INTRAVENOUS at 07:54

## 2024-04-25 RX ADMIN — SODIUM CHLORIDE, POTASSIUM CHLORIDE, SODIUM LACTATE AND CALCIUM CHLORIDE: 600; 310; 30; 20 INJECTION, SOLUTION INTRAVENOUS at 06:27

## 2024-04-25 RX ADMIN — CALCIUM CHLORIDE 0.2 G: 100 INJECTION, SOLUTION INTRAVENOUS at 08:09

## 2024-04-25 RX ADMIN — FENTANYL CITRATE 50 MCG: 50 INJECTION, SOLUTION INTRAMUSCULAR; INTRAVENOUS at 07:35

## 2024-04-25 RX ADMIN — HYDROMORPHONE HYDROCHLORIDE 0.5 MG: 2 INJECTION, SOLUTION INTRAMUSCULAR; INTRAVENOUS; SUBCUTANEOUS at 08:23

## 2024-04-25 RX ADMIN — HYDROMORPHONE HYDROCHLORIDE 0.5 MG: 2 INJECTION, SOLUTION INTRAMUSCULAR; INTRAVENOUS; SUBCUTANEOUS at 09:39

## 2024-04-25 RX ADMIN — ROCURONIUM BROMIDE 70 MG: 10 INJECTION, SOLUTION INTRAVENOUS at 07:35

## 2024-04-25 RX ADMIN — PROPOFOL 150 MG: 10 INJECTION, EMULSION INTRAVENOUS at 07:35

## 2024-04-25 RX ADMIN — VANCOMYCIN HYDROCHLORIDE 1000 MG: 1 INJECTION, POWDER, LYOPHILIZED, FOR SOLUTION INTRAVENOUS at 07:31

## 2024-04-25 RX ADMIN — HYDROMORPHONE HYDROCHLORIDE 0.5 MG: 2 INJECTION, SOLUTION INTRAMUSCULAR; INTRAVENOUS; SUBCUTANEOUS at 10:34

## 2024-04-25 RX ADMIN — VANCOMYCIN HYDROCHLORIDE 1000 MG: 1 INJECTION, POWDER, LYOPHILIZED, FOR SOLUTION INTRAVENOUS at 07:48

## 2024-04-25 RX ADMIN — HYDROMORPHONE HYDROCHLORIDE 0.5 MG: 2 INJECTION, SOLUTION INTRAMUSCULAR; INTRAVENOUS; SUBCUTANEOUS at 07:59

## 2024-04-25 RX ADMIN — ROCURONIUM BROMIDE 20 MG: 10 INJECTION, SOLUTION INTRAVENOUS at 09:02

## 2024-04-25 RX ADMIN — ROCURONIUM BROMIDE 10 MG: 10 INJECTION, SOLUTION INTRAVENOUS at 08:09

## 2024-04-25 RX ADMIN — CALCIUM CHLORIDE 0.2 G: 100 INJECTION, SOLUTION INTRAVENOUS at 08:19

## 2024-04-25 RX ADMIN — ONDANSETRON 4 MG: 2 INJECTION INTRAMUSCULAR; INTRAVENOUS at 07:48

## 2024-04-25 RX ADMIN — OXYCODONE HYDROCHLORIDE 5 MG: 5 TABLET ORAL at 12:58

## 2024-04-25 RX ADMIN — LIDOCAINE HYDROCHLORIDE 100 MG: 20 INJECTION, SOLUTION INFILTRATION; PERINEURAL at 07:35

## 2024-04-25 RX ADMIN — MIDAZOLAM HYDROCHLORIDE 2 MG: 2 INJECTION, SOLUTION INTRAMUSCULAR; INTRAVENOUS at 07:27

## 2024-04-25 ASSESSMENT — PAIN DESCRIPTION - LOCATION: LOCATION: BREAST

## 2024-04-25 ASSESSMENT — PAIN - FUNCTIONAL ASSESSMENT
PAIN_FUNCTIONAL_ASSESSMENT: NONE - DENIES PAIN
PAIN_FUNCTIONAL_ASSESSMENT: 0-10
PAIN_FUNCTIONAL_ASSESSMENT: 0-10

## 2024-04-25 ASSESSMENT — PAIN SCALES - GENERAL
PAINLEVEL_OUTOF10: 1
PAINLEVEL_OUTOF10: 4

## 2024-04-25 ASSESSMENT — LIFESTYLE VARIABLES: SMOKING_STATUS: 0

## 2024-04-25 ASSESSMENT — PAIN DESCRIPTION - DESCRIPTORS
DESCRIPTORS: DISCOMFORT
DESCRIPTORS: ACHING;DISCOMFORT

## 2024-04-25 NOTE — ANESTHESIA POSTPROCEDURE EVALUATION
Department of Anesthesiology  Postprocedure Note    Patient: Kaylin Sanchez  MRN: [id number]  YOB: 1980  Date of evaluation: 4/25/2024    Procedure Summary       Date: 04/25/24 Room / Location: [address]    Anesthesia Start: 0730 Anesthesia Stop: 1047    Procedures:       BILATERAL NIPPLE SPARING MASTECTOMIES (Bilateral: Breast)      Bilateral breast reconstruction with direct to implant reconstruction with Alloderm, (Bilateral: Breast) Diagnosis:       At high risk for breast cancer      (At high risk for breast cancer [Z91.89])    Surgeons: Debi Main MD; Ky Whitehead MD Responsible Provider: Anderson Umanzor DO    Anesthesia Type: general ASA Status: 3            Anesthesia Type: No value filed.    Baldemar Phase I: Baldemar Score: 10    Baldemar Phase II:      Anesthesia Post Evaluation    Patient location during evaluation: PACU  Patient participation: complete - patient participated  Level of consciousness: awake and responsive to verbal stimuli  Pain score: 2  Airway patency: patent  Nausea & Vomiting: no nausea and no vomiting  Cardiovascular status: hemodynamically stable  Respiratory status: acceptable  Hydration status: stable  Multimodal analgesia pain management approach  Pain management: adequate    No notable events documented.

## 2024-04-25 NOTE — BRIEF OP NOTE
Brief Postoperative Note      Patient: Kaylin Sanchez  YOB: 1980  MRN: [id number]    Date of Procedure: 4/25/2024    Pre-Op Diagnosis Codes:     * At high risk for breast cancer [Z91.89]    Post-Op Diagnosis: Same       Procedure(s):  BILATERAL NIPPLE SPARING MASTECTOMIES  Bilateral breast reconstruction with direct to implant reconstruction with Alloderm,    Surgeon(s):  Debi Main MD Kundu, Neilendu, MD Kundu, Neilendu, MD    Assistant:  Surgical Assistant: Anel Roberts; Cindy Vogel  Resident: Kusum Davila MD    Anesthesia: General    Estimated Blood Loss (mL): 50    Complications: None    Specimens:   ID Type Source Tests Collected by Time Destination   A : Left Breast (Short Stitch Superior, Long Stitch Lateral, Double Stitch at Nipple) Tissue Tissue SURGICAL PATHOLOGY Debi Main MD 4/25/2024 0851    B : Right Breast (Short Stitch Superior, Long Stitch Lateral, Double Stitch at Nipple) Tissue Tissue SURGICAL PATHOLOGY Debi Main MD 4/25/2024 0956        Implants:  Implant Name Type Inv. Item Serial No.  Lot No. LRB No. Used Action   GRAFT HUM TISS THK2-2.8MM 164 SQ CM THCK L PERF CNTOUR ACELLULAR DERM - [id number]  GRAFT HUM TISS THK2-2.8MM 164 SQ CM THCK L PERF CNTOUR ACELLULAR DERM  PAYMILL [id number] Left 1 Implanted   GRAFT HUM TISS THK2-2.8MM 164 SQ CM THCK L PERF CNTOUR ACELLULAR DERM - [id number]  GRAFT HUM TISS THK2-2.8MM 164 SQ CM THCK L PERF CNTOUR ACELLULAR DERM  [employer] [id number] Left 1 Implanted   GRAFT HUM TISS THK2-2.8MM 164 SQ CM THCK L PERF CNTOUR ACELLULAR DERM - [id number]  GRAFT HUM TISS THK2-2.8MM 164 SQ CM THCK L PERF CNTOUR ACELLULAR DERM  PAYMILL [id number] Right 1 Implanted   GRAFT HUM TISS THK2-2.8MM 164 SQ CM THCK L PERF CNTOUR ACELLULAR DERM - [id number]  GRAFT HUM TISS THK2-2.8MM 164 SQ CM THCK L PERF CNTOUR ACELLULAR DERM  [employer] [id number] Right 1 Implanted

## 2024-04-25 NOTE — PROGRESS NOTES
Patient c/O of pain an d nausea on ambulation to bathroom    Oxycodone 5mg and zofran given per orders.

## 2024-04-25 NOTE — PROGRESS NOTES
Dr. Davila called, the pt will need a pain medication RX, her pain MD is aware that she will get pain rx here.  Resident will bring rx to pacu when finished with pt currently in OR

## 2024-04-25 NOTE — INTERVAL H&P NOTE
Update History & Physical    The patient's History and Physical of April 9, 2024 was reviewed with the patient and I examined the patient. There was no change. The surgical site was confirmed by the patient and me.     Plan: The risks, benefits, expected outcome, and alternative to the recommended procedure have been discussed with the patient. Patient understands and wants to proceed with the procedure.     Electronically signed by Kusum Davila MD on 4/25/2024 at 6:35 AM

## 2024-04-25 NOTE — OP NOTE
Operative Note    Kaylin Sanchez  YOB: 1980  MRN: [id number]    PREOPERATIVE DIAGNOSIS  Elevated risk for breast cancer     POSTOPERATIVE DIAGNOSIS  Elevated risk for breast cancer    PROCEDURE  1. bilateral risk-reducing skin-sparing, nipple-sparing mastectomy.    ANESTHESIA  General anesthesia.    SURGEON  Debi Main MD     ASSISTANT  Resident: Kusum Davila MD    ESTIMATED BLOOD LOSS  less than 50  ml    COMPLICATIONS  None apparent by completion of procedure    SPECIMENS REMOVED  1. The left breast which was marked with a short stitch superiorly, a long stitch laterally, and a double stitch at the nipple  2. The right breast which was marked with a short stitch superiorly, a long stitch laterally, and a double stitch at the nipple    FINDINGS  The patient underwent bilateral mastectomies through true skin-sparing and nipple-sparing inframammary fold incisions.   The tissue was unremarkable.     POST-OP CONDITION  Stable    DISPOSITION  To recovery room    INDICATION FOR PROCEDURE  Kaylin Sanchez is a 43 y.o. woman who was found to have an elevated risk for future breast cancer based on family history. Due to this, she presents to have bilateral skin-sparing, nipple-sparing mastectomies with immediate reconstruction.  She presents today for that purpose. The indications for the planned procedure, along with the potential benefits and risks which include but are not limited to the risk of anesthesia, bleeding, infection, seroma formation, skin or nipple necrosis, possible nipple loss, possible need for additional surgery pending final pathologic assessment, lymphedema, sensation changes, scars, and unappealing cosmetics were reviewed. All questions were answered and she agrees to proceed.    DESCRIPTION OF PROCEDURE   Kaylin Sanchez was brought to the operating room and placed supine on the operating room table with her arms extended on boards. She was appropriately positioned and  retractors were utilized for exposure due to the difficult dissection and the plasma blade was utilized to decrease heat at the tissue plane.  An inferior flap was created down to the inframammary fold which was taken down onto the rectus abdominis and serratus anterior muscles.  Care was taken not to enter the fascia of either of these two muscles.  A medial flap was created down to the sternal border and a lateral flap was created down to the latissimus dorsi.  Once all four flaps were created, the breast was removed from the posterior attachments using electrocautery.  Care was taken to include the pectoralis major muscle fascia en bloc with the breast.  The breast was then oriented and passed off the field.  Attention was then turned to the wound.  Aggressive hemostasis was obtained with electrocautery.  The wound was irrigated with copious amounts of sterile saline.  A warm wet sponge was then placed in the cavity.  The patient tolerated this portion of the procedure well and plastic surgery entered the room to complete the reconstruction.    Please also note that the nipple-sparing nature of this case increased the complexity of the procedure.  The complexity of the case was associated with removal of the breast via a small cosmetically appealing incision that allowed for preservation of the nipple-areolar complex while preserving the entire skin envelope.  Lighted retractors were necessary for complete visualization and exposure.  The procedure required additional time (increased by approximately 40-50% due to the incision location and size), skill, and risk due to the total skin-sparing and nipple-sparing approach.    Electronically signed by Debi Main MD on 4/25/24 at 10:49 AM EDT

## 2024-04-25 NOTE — PROGRESS NOTES
PACU Transfer to [facility]    Vitals:    04/25/24 1215   BP: 118/82   Pulse: 91   Resp: 15   Temp: 97.5 °F (36.4 °C)   SpO2: 99%         Intake/Output Summary (Last 24 hours) at 4/25/2024 1230  Last data filed at 4/25/2024 1228  Gross per 24 hour   Intake 2110 ml   Output 50 ml   Net 2060 ml       Pain assessment:    Pain Level: 1    Patient transferred to care of [facility] RN.    4/25/2024 12:30 PM  Tolerating ice chips

## 2024-04-25 NOTE — PROGRESS NOTES
Dr. Davila received message about pain meds, she escribed meds to pt pharmacy, new AVS printed, SD aware.

## 2024-04-25 NOTE — PROGRESS NOTES
Dr. Davila in the OR, perfect served to escribe pain rx when finished,  updated, pt agreeable to go home

## 2024-04-25 NOTE — OP NOTE
[facility] PLASTIC & RECONSTRUCTIVE SURGERY     OPERATIVE DICTATION    NAME: Kaylin Sanchez   MRN: [id number]  DATE: 4/25/2024    AGE: 43 y.o.    SURGEON: Ky Whitehead MD  ASSISTANT: Kusum Davila (PGY1), Cindy Vogel (SA)     BREAST SURGEON: Debi Main MD    PREOPERATIVE DIAGNOSIS: Acquired absence bilateral breast, status post mastectomy   POSTOPERATIVE DIAGNOSIS: Same     OPERATION: 1) Immediate bilateral direct to implant breast reconstruction  2) Insertion of Alloderm Acellular Dermal Matrix (328 cm^2))   3) Intraoperative SPY fluorescent angiography  4) Application of Gaby incisional wound vacuum device      ANESTHESIA: General     ESTIMATED BLOOD LOSS: <50 mL (from plastics)    OPERATIVE INDICATIONS: This is a 43 y.o. female who underwent mastectomy for high risk breast cancer with details listed in a separate operative dictation. Options of reconstruction were discussed with the patient and she opted for direct implant based options. The plan of surgery, risks, benefits, alternatives, indications, limitations, possible complications, and future surgeries were discussed with the patient and she agreed to proceed.     OPERATIVE PROCEDURE: The patient was marked in the standing position in the preoperative holding area.  She was then brought to the operating room and placed in the supine position on the operating table. After satisfactory induction with general endotracheal anesthesia, the patient was then prepped and draped in the usual sterile fashion. Breast surgery commenced by performing their mastectomy. Details of the procedure are listed in a separate operative dictation.     Plastic surgery then scrubbed and obtained hemostasis on the left breast initially with electrocautery. Prepectoral reconstruction was performed by measuring the breast size after hemostasis was obtained and the wound copiously irrigated. An Exparel breast block was performed and then and multiple breast sizers were

## 2024-04-25 NOTE — PROGRESS NOTES
Pt arrived form OR, s/p   General  BILATERAL NIPPLE SPARING MASTECTOMIES - Bilateral    Plastics/Reconstructive  Bilateral breast reconstruction with direct to implant reconstruction with Alloderm, - Bilateral  GeneralL NIPPLE SPARING MASTECTOMIES - Bilateral , report received form CRNA, OR RN and Dr. Davila.  Pt arrived in stable condition, awakens and goes back to sleep, denies pain at this time.  Bilat wound vac PREVENA PLUS in place and J-p X 2.  Pt received Exaprel, band on left arm

## 2024-04-25 NOTE — ANESTHESIA PRE PROCEDURE
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  was good, repeat recommended yearly at that point.   • HNP (herniated nucleus pulposus), cervical    • Medial meniscus tear     left knee   • Menorrhagia    • Migraine    • TMJ (dislocation of temporomandibular joint)    • Tobacco use     not smoking as 3/1/24       Past Surgical History:        Procedure Laterality Date   • BREAST BIOPSY     • BREAST SURGERY Left 07/05/2022    LEFT BREAST EXCISIONAL BIOPSY performed by Haritha Duckworth MD at [facility] OR   • CERVICAL DISCECTOMY      revision with bone graft   • CERVICAL SPINE SURGERY  04/27/2018    2 discs removed, cadaver put in and fused x2   • ENDOMETRIAL ABLATION  04/19/2013    Jose, D&C, Diagnostic Hysteroscopy   • KNEE ARTHROSCOPY Right 11/19/2013   • KNEE ARTHROSCOPY Right    • KNEE SURGERY Left    • KNEE SURGERY Right 02/26/2013   • LAPAROSCOPY     • LEEP     • OTHER SURGICAL HISTORY  05/22/2013    cystoscopy, trans vaginal taping   • OTHER SURGICAL HISTORY

## 2024-04-25 NOTE — DISCHARGE INSTRUCTIONS
[facility]    Job Whitehead MD  [address] ([address])  [place] [address] [phone]    Post-op Instructions  ___________________________________________    Direct to Implant Breast Reconstruction    The following instructions will help you know what to expect in the days following your surgery. Do not, however, hesitate to call if you have questions or concerns.    Activities    - Sleep in a flexed position with your head and shoulders elevated. Keep pillows under your knees for the first few days. You can resume your normal sleeping position when comfortable.   - Driving is prohibited for 1 to 2 weeks. Do not drive while taking pain medications.   - Avoid heavy lifting (no more than 5 pounds) and vigorous use of your arms for the first 3 weeks.   - Do not engage in sports, aerobics, or vacuuming.   - Start arm raising exercises gently within 1 week of surgery. This will be discussed at your follow-up appointment.         - Avoid heavy lifting >5 lb, bending, pushing, pulling, or straining   - Smoking (or ANY nicotine containing product) is prohibited for a minimum of 6 weeks as it interferes with healing and can lead to significant - and avoidable - complications  - You may need to use fiberfill or other padding on the opposite breast to maintain symmetry in clothing. Shoulder pads sometimes work nicely too.     Diet  - Resume your preoperative diet as tolerated.     Pain Control/Medications          - You are already on pain medication.  We have given you a separate script. Do not take your home pain medication if you are taking our postoperative narcotic medication. If you have additional significant pain, please call your physician who manages your pain medication. The pain medication may cause constipation and does impair your ability to drive or make important decisions.          - You may also be given a muscle relaxant that can help with muscle spasms. Do not

## 2024-04-25 NOTE — PROGRESS NOTES
Ambulatory Surgery/Procedure Discharge Note    Vitals:    04/25/24 1240   BP: 121/76   Pulse: 78   Resp: 13   Temp: 97.7 °F (36.5 °C)   SpO2: 98%   Patient meets criteria for discharge per roseann score    In: 2110 [I.V.:1800]  Out: 50     Restroom use offered before discharge.  Yes    Pain assessment:  present - adequately treated  Pain Level: 4    Pt and S.O./family states \"ready to go home\". Pt alert and oriented x4. IV removed. Denies N/V or pain. Dressing wound, vacs and drains c,D,I.   Voided prior to discharge. Pt tolerating po intake. Discharge instructions given to pt and arabella russo  with pt permission. Pt and family  verbalized understanding of all instructions. Left with all belongings, X3 prescriptions, and discharge instructions.     Patient discharged to home/self care. Patient discharged via wheel chair by transporter to waiting family/S.O.       4/25/2024 1:24 PM

## 2024-04-29 DIAGNOSIS — B00.1 RECURRENT COLD SORES: ICD-10-CM

## 2024-04-29 DIAGNOSIS — F90.9 ATTENTION DEFICIT HYPERACTIVITY DISORDER (ADHD), UNSPECIFIED ADHD TYPE: ICD-10-CM

## 2024-04-30 ENCOUNTER — TELEPHONE (OUTPATIENT)
Dept: SURGERY | Age: 44
End: 2024-04-30

## 2024-04-30 RX ORDER — VALACYCLOVIR HYDROCHLORIDE 1 G/1
TABLET, FILM COATED ORAL
Qty: 4 TABLET | Refills: 5 | Status: SHIPPED | OUTPATIENT
Start: 2024-04-30

## 2024-04-30 RX ORDER — DEXTROAMPHETAMINE SACCHARATE, AMPHETAMINE ASPARTATE, DEXTROAMPHETAMINE SULFATE AND AMPHETAMINE SULFATE 5; 5; 5; 5 MG/1; MG/1; MG/1; MG/1
30 TABLET ORAL DAILY
Qty: 45 TABLET | Refills: 0 | Status: SHIPPED | OUTPATIENT
Start: 2024-04-30 | End: 2024-05-30

## 2024-04-30 NOTE — TELEPHONE ENCOUNTER
Last Office Visit  -  04/09/2024  Next Office Visit  -  n/a    Last Filled  -  04/21/2024  Last UDS -  10/16/2019  Contract -  n/a

## 2024-04-30 NOTE — TELEPHONE ENCOUNTER
Patient called with c/o having blisters on the inside of the Tegaderm. She feels like one might be on the marker. She has been taking benadryl for the itching every 4 hours. She has had some drainage by the drainage tube but thinks it is coming from the blisters not the drain tube.    She has a post op appointment on 5/2/24 and thinks she can wait until then to be seen but wanted to make the office aware.    Please call: [phone]

## 2024-04-30 NOTE — TELEPHONE ENCOUNTER
Spoke with patient instructed to start zyrtec and Pepcid for itching, we will see her Thursday for her post op, I offered her and apt. For tomorrow but she can not make it tomorrow fidencio to other drs apts.

## 2024-05-02 ENCOUNTER — OFFICE VISIT (OUTPATIENT)
Dept: SURGERY | Age: 44
End: 2024-05-02

## 2024-05-02 VITALS
BODY MASS INDEX: 25.51 KG/M2 | SYSTOLIC BLOOD PRESSURE: 106 MMHG | WEIGHT: 144 LBS | TEMPERATURE: 97.5 F | OXYGEN SATURATION: 98 % | HEART RATE: 96 BPM | DIASTOLIC BLOOD PRESSURE: 76 MMHG

## 2024-05-02 DIAGNOSIS — Z09 POSTOP CHECK: Primary | ICD-10-CM

## 2024-05-02 PROCEDURE — 99024 POSTOP FOLLOW-UP VISIT: CPT

## 2024-05-02 RX ORDER — DIAZEPAM 5 MG/1
5 TABLET ORAL EVERY 12 HOURS PRN
Qty: 20 TABLET | Refills: 0 | Status: SHIPPED | OUTPATIENT
Start: 2024-05-02 | End: 2024-05-12

## 2024-05-02 RX ORDER — FLUCONAZOLE 150 MG/1
150 TABLET ORAL DAILY
Qty: 3 TABLET | Refills: 0 | Status: SHIPPED | OUTPATIENT
Start: 2024-05-02 | End: 2024-05-05

## 2024-05-02 NOTE — PROGRESS NOTES
[facility]      PROCEDURE: 1) Immediate bilateral direct to implant breast reconstruction  2) Insertion of Alloderm Acellular Dermal Matrix (328 cm^2))   3) Intraoperative SPY fluorescent angiography  4) Application of Gaby incisional wound vacuum device  DATE: 4/25/24    Kaylin Sanchez has been recovering well since her procedure. Pain has been poorly controlled with the prescribed pain management regimen. Patient states she is still having some break thru pain.     EXAM    /76   Pulse 96   Temp 97.5 °F (36.4 °C)   Wt 65.3 kg (144 lb)   SpO2 98%   BMI 25.51 kg/m²       GEN: NAD   BREAST: Wound vac in tact and holding suction. Drains serosang.     IMP: 43 y.o.female s/p B DTI with wound vac  PLAN: We will send script to pharmacy for valium and diflucan. She states the antibiotic has also given her a yeast infection. The patient did not come to education so she has been doing more repetitive arm movements than she should. We discussed restrictions. She will follow up next week for wound vac take down and potential drain removal. She will call with any concerns in the interim.     Rhonda Kelly, APRN - CNP   [facility] Plastic & Reconstructive Surgery  [phone]  05/02/24            76M with h/o HTN, GERD, CKD3, chronic back and neck pain, cocaine use presents with several weeks of generalized weakness and fatigue, concerning for refractive GI bleed. S/p 1U PRBC at current admission.  75yo Male with h/o HTN, GERD, CKD3, chronic back and neck pain, h/o cocaine use a/w generalized weakness due to symptomatic blood loss anemia 2/2 upper GIB vs small intestinal bleed;

## 2024-05-06 ENCOUNTER — TELEPHONE (OUTPATIENT)
Dept: SURGERY | Age: 44
End: 2024-05-06

## 2024-05-06 NOTE — TELEPHONE ENCOUNTER
The patient called with c/o of the left falguni drain constantly making a noise. It is still draining however not a lot is coming out. She first noticed this yesterday 5/5/24.    Please call: [phone]

## 2024-05-07 NOTE — TELEPHONE ENCOUNTER
Spoke with patient she is still getting drainage into falguni so I explained it probably has a pin hole in it but that as long as it is still draining we will leave it be until we see her Thursday to pull it, I advised that if anything changes to please let us know

## 2024-05-08 ENCOUNTER — PATIENT MESSAGE (OUTPATIENT)
Dept: FAMILY MEDICINE CLINIC | Age: 44
End: 2024-05-08

## 2024-05-08 DIAGNOSIS — E66.3 OVERWEIGHT: Primary | ICD-10-CM

## 2024-05-08 NOTE — TELEPHONE ENCOUNTER
From: Kaylin Sanchez  To: Dr. Candelario Cabello  Sent: 5/8/2024 1:52 PM EDT  Subject: phentermine (ADIPEX-P) 37.5 MG tablet    Could this refill be sent to [facility] of [facility] [address]?

## 2024-05-08 NOTE — PROGRESS NOTES
[facility]    PROCEDURE: 1) Immediate bilateral direct to implant breast reconstruction  2) Insertion of Alloderm Acellular Dermal Matrix (328 cm^2))   3) Intraoperative SPY fluorescent angiography  4) Application of Gaby incisional wound vacuum device  DATE: 4/25/24    Kaylin Sanchez has been recovering well since her procedure. Pain has been well controlled with the prescribed pain management regimen. Patient presents today for bilateral drain removal and wound take down.     EXAM    /78   Pulse 80   Temp 98.3 °F (36.8 °C)   Wt 65.3 kg (144 lb)   SpO2 98%   BMI 25.51 kg/m²      GEN: NAD   BREAST: Incision site healing appropriately. No hematoma/seroma.  Drains serosang.     IMP: 43 y.o.female s/p B DTI with wound vac  PLAN: Doing well overall. Bilateral drains removed and wound take down. Patient is happy thus far. Will follow up in 1 month with Dr. Whitehead to ensure continued healing. Will call with any concerns in the interim.      Rhonda Kelly, APRN - CNP   [facility] Plastic & Reconstructive Surgery  [phone]  5/9/24

## 2024-05-08 NOTE — TELEPHONE ENCOUNTER
Last Office Visit  -  4/9/24 (pre-op w NB)   Next Office Visit  -  n/a    Last Filled  -  3/1/24  Last UDS -    Contract -

## 2024-05-09 ENCOUNTER — OFFICE VISIT (OUTPATIENT)
Dept: SURGERY | Age: 44
End: 2024-05-09

## 2024-05-09 VITALS
BODY MASS INDEX: 25.51 KG/M2 | WEIGHT: 144 LBS | TEMPERATURE: 98.3 F | DIASTOLIC BLOOD PRESSURE: 78 MMHG | HEART RATE: 80 BPM | SYSTOLIC BLOOD PRESSURE: 109 MMHG | OXYGEN SATURATION: 98 %

## 2024-05-09 DIAGNOSIS — Z09 POSTOP CHECK: Primary | ICD-10-CM

## 2024-05-09 PROCEDURE — 99024 POSTOP FOLLOW-UP VISIT: CPT

## 2024-05-13 RX ORDER — PHENTERMINE HYDROCHLORIDE 37.5 MG/1
37.5 CAPSULE ORAL EVERY MORNING
Qty: 30 CAPSULE | Refills: 0 | Status: SHIPPED | OUTPATIENT
Start: 2024-05-13 | End: 2024-06-12

## 2024-05-14 ENCOUNTER — OFFICE VISIT (OUTPATIENT)
Dept: SURGERY | Age: 44
End: 2024-05-14

## 2024-05-14 VITALS
HEART RATE: 99 BPM | DIASTOLIC BLOOD PRESSURE: 89 MMHG | TEMPERATURE: 98.6 F | OXYGEN SATURATION: 99 % | WEIGHT: 155 LBS | RESPIRATION RATE: 16 BRPM | SYSTOLIC BLOOD PRESSURE: 132 MMHG | HEIGHT: 63 IN | BODY MASS INDEX: 27.46 KG/M2

## 2024-05-14 DIAGNOSIS — Z09 POSTOP CHECK: Primary | ICD-10-CM

## 2024-05-14 PROCEDURE — 99024 POSTOP FOLLOW-UP VISIT: CPT | Performed by: SURGERY

## 2024-05-14 NOTE — PROGRESS NOTES
[facility]    PROCEDURE: 1) Immediate bilateral direct to implant breast reconstruction  2) Insertion of Alloderm Acellular Dermal Matrix (328 cm^2))   3) Intraoperative SPY fluorescent angiography  4) Application of Gaby incisional wound vacuum device  DATE: 4/25/24    Kaylin Sanchez has been recovering satisfactorily since her procedure. Pain has been well controlled without pain medications.     EXAM    /89 (Site: Left Upper Arm, Position: Sitting, Cuff Size: Medium Adult)   Pulse 99   Temp 98.6 °F (37 °C) (Temporal)   Resp 16   Ht 1.6 m (5' 3\")   Wt 70.3 kg (155 lb)   SpO2 99%   BMI 27.46 kg/m²      GEN: NAD   BREAST: Incisions healing with some epidermolysis  Good contour  No evidence of infection    IMP: 43 y.o.female s/p B DTI  PLAN: Overall she is doing well.  We discussed that her activity level is too high at this time and she understands. Will return in 1 week for evaluation.     Job Whitehead MD  [facility] Plastic & Reconstructive Surgery  [phone]  05/14/24

## 2024-05-21 NOTE — PROGRESS NOTES
[facility]    PROCEDURE: 1) Immediate bilateral direct to implant breast reconstruction  2) Insertion of Alloderm Acellular Dermal Matrix (328 cm^2))   3) Intraoperative SPY fluorescent angiography  4) Application of Gaby incisional wound vacuum device  DATE: 4/25/24    Kaylin Sanchez has been recovering satisfactorily since her procedure. Pain has been well controlled without pain medications.     EXAM    /78 (Site: Right Upper Arm, Position: Sitting, Cuff Size: Medium Adult)   Pulse 78   Temp 98.1 °F (36.7 °C) (Infrared)   Resp 16   Wt 69.3 kg (152 lb 12.8 oz)   SpO2 97%   BMI 27.07 kg/m²      GEN: NAD   BREAST: Incisions healing with some epidermolysis  Good contour  No evidence of infection    IMP: 44 y.o.female s/p B DTI  PLAN: Overall she is doing well.  We again discussed restrictions and she will apply Silvadene to the incision on the left and follow-up in 1 week.    Job Whitehead MD  [facility] Plastic & Reconstructive Surgery  [phone]  05/22/24

## 2024-05-22 ENCOUNTER — OFFICE VISIT (OUTPATIENT)
Dept: SURGERY | Age: 44
End: 2024-05-22

## 2024-05-22 VITALS
SYSTOLIC BLOOD PRESSURE: 117 MMHG | RESPIRATION RATE: 16 BRPM | TEMPERATURE: 98.1 F | WEIGHT: 152.8 LBS | BODY MASS INDEX: 27.07 KG/M2 | OXYGEN SATURATION: 97 % | HEART RATE: 78 BPM | DIASTOLIC BLOOD PRESSURE: 78 MMHG

## 2024-05-22 DIAGNOSIS — Z09 POSTOP CHECK: Primary | ICD-10-CM

## 2024-05-22 PROCEDURE — 99024 POSTOP FOLLOW-UP VISIT: CPT | Performed by: SURGERY

## 2024-05-28 NOTE — PROGRESS NOTES
[facility]    PROCEDURE: 1) Immediate bilateral direct to implant breast reconstruction  2) Insertion of Alloderm Acellular Dermal Matrix (328 cm^2))   3) Intraoperative SPY fluorescent angiography  4) Application of Gaby incisional wound vacuum device  DATE: 4/25/24    Kaylin Sanchez has been recovering satisfactorily since her procedure. Pain has been well controlled without pain medications.     EXAM    /79 (Site: Right Upper Arm, Position: Sitting, Cuff Size: Medium Adult)   Pulse (!) 102   Temp 98.5 °F (36.9 °C) (Infrared)   Resp 16   Wt 69.4 kg (153 lb)   SpO2 98%   BMI 27.10 kg/m²      GEN: NAD   BREAST: Incisions healed  Good contour  No evidence of infection    IMP: 44 y.o.female s/p B DTI  PLAN: Overall she is doing well.  She will follow-up in 3 weeks to ensure continued wound healing success.    Job Whitehead MD  [facility] Plastic & Reconstructive Surgery  [phone]  05/29/24

## 2024-05-29 ENCOUNTER — OFFICE VISIT (OUTPATIENT)
Dept: SURGERY | Age: 44
End: 2024-05-29

## 2024-05-29 VITALS
SYSTOLIC BLOOD PRESSURE: 132 MMHG | TEMPERATURE: 98.5 F | BODY MASS INDEX: 27.1 KG/M2 | HEART RATE: 102 BPM | DIASTOLIC BLOOD PRESSURE: 79 MMHG | OXYGEN SATURATION: 98 % | WEIGHT: 153 LBS | RESPIRATION RATE: 16 BRPM

## 2024-05-29 DIAGNOSIS — Z09 POSTOP CHECK: Primary | ICD-10-CM

## 2024-05-29 PROCEDURE — 99024 POSTOP FOLLOW-UP VISIT: CPT | Performed by: SURGERY

## 2024-05-30 ENCOUNTER — TELEPHONE (OUTPATIENT)
Dept: SURGERY | Age: 44
End: 2024-05-30

## 2024-05-30 NOTE — TELEPHONE ENCOUNTER
The patient called requesting a note stating she is to work from home until further evaluation at her next follow up appointment on 6/28/24.    Please email to: [email or web address]

## 2024-05-31 NOTE — TELEPHONE ENCOUNTER
The patient called stating the date on the note needs to be changed to 7/1/2024 instead of 6/6/24.    Please email to: [email or web address]

## 2024-06-28 ENCOUNTER — OFFICE VISIT (OUTPATIENT)
Dept: SURGERY | Age: 44
End: 2024-06-28

## 2024-06-28 VITALS
WEIGHT: 153 LBS | TEMPERATURE: 97.7 F | SYSTOLIC BLOOD PRESSURE: 138 MMHG | OXYGEN SATURATION: 98 % | BODY MASS INDEX: 27.1 KG/M2 | DIASTOLIC BLOOD PRESSURE: 89 MMHG | HEART RATE: 88 BPM

## 2024-06-28 DIAGNOSIS — N64.4 BREAST PAIN: Primary | ICD-10-CM

## 2024-06-28 PROCEDURE — 99024 POSTOP FOLLOW-UP VISIT: CPT

## 2024-06-28 NOTE — PROGRESS NOTES
[facility]    PROCEDURE: 1) Immediate bilateral direct to implant breast reconstruction  2) Insertion of Alloderm Acellular Dermal Matrix (328 cm^2))   3) Intraoperative SPY fluorescent angiography  4) Application of Gaby incisional wound vacuum device  DATE: 4/25/24    Kaylin Sanchez has been recovering satisfactorily since her procedure. Pain has been well controlled without pain medications. Patient presents back for follow up. She states that she is still having intermittent swelling of left breast and erythema that presents and resolves on its on. She denies any fevers or chills. She also states she has a lot of discomfort in her left breast verses the right.     EXAM    /89   Pulse 88   Temp 97.7 °F (36.5 °C)   Wt 69.4 kg (153 lb)   SpO2 98%   BMI 27.10 kg/m²      GEN: NAD   BREAST: Incisions healed  Good contour  No evidence of infection    IMP: 44 y.o.female s/p B DTI  PLAN: We will order left breast ultrasound to further evaluate the patient's concerns. We will follow up with next steps once this is resulted. She will call with any additional concerns in the interim.     Rhonda Kelly, APRN-CNP  [facility] Plastic & Reconstructive Surgery  [phone]  6/28/24

## 2024-07-01 DIAGNOSIS — F90.9 ATTENTION DEFICIT HYPERACTIVITY DISORDER (ADHD), UNSPECIFIED ADHD TYPE: ICD-10-CM

## 2024-07-01 RX ORDER — DEXTROAMPHETAMINE SACCHARATE, AMPHETAMINE ASPARTATE, DEXTROAMPHETAMINE SULFATE AND AMPHETAMINE SULFATE 5; 5; 5; 5 MG/1; MG/1; MG/1; MG/1
30 TABLET ORAL DAILY
Qty: 45 TABLET | Refills: 0 | Status: SHIPPED | OUTPATIENT
Start: 2024-07-01 | End: 2024-07-31

## 2024-07-01 NOTE — TELEPHONE ENCOUNTER
Last Office Visit  -  4/9/24  Next Office Visit  -  n/a    Last Filled  -  4/30/24  Last UDS -  10/16/19  Contract -  n/a

## 2024-07-08 ENCOUNTER — HOSPITAL ENCOUNTER (OUTPATIENT)
Dept: WOMENS IMAGING | Age: 44
Discharge: HOME | End: 2024-07-08
Payer: OTHER GOVERNMENT

## 2024-07-08 DIAGNOSIS — N64.4 BREAST PAIN: ICD-10-CM

## 2024-07-08 PROCEDURE — 76641 ULTRASOUND BREAST COMPLETE: CPT

## 2024-08-22 ENCOUNTER — OFFICE VISIT (OUTPATIENT)
Dept: SURGERY | Age: 44
End: 2024-08-22
Payer: OTHER GOVERNMENT

## 2024-08-22 ENCOUNTER — HOSPITAL ENCOUNTER (INPATIENT)
Age: 44
LOS: 4 days | Discharge: HOME | DRG: 907 | End: 2024-08-26
Attending: SURGERY | Admitting: SURGERY
Payer: OTHER GOVERNMENT

## 2024-08-22 VITALS
BODY MASS INDEX: 26.33 KG/M2 | HEIGHT: 63 IN | TEMPERATURE: 100.5 F | DIASTOLIC BLOOD PRESSURE: 73 MMHG | RESPIRATION RATE: 16 BRPM | SYSTOLIC BLOOD PRESSURE: 105 MMHG | WEIGHT: 148.6 LBS | HEART RATE: 132 BPM

## 2024-08-22 DIAGNOSIS — N61.0 CELLULITIS OF LEFT BREAST: Primary | ICD-10-CM

## 2024-08-22 DIAGNOSIS — G89.18 POST-OP PAIN: Primary | ICD-10-CM

## 2024-08-22 DIAGNOSIS — N64.4 MASTODYNIA: ICD-10-CM

## 2024-08-22 LAB
ABO + RH BLD: NORMAL
ALBUMIN SERPL-MCNC: 4 G/DL (ref 3.4–5)
ANION GAP SERPL CALCULATED.3IONS-SCNC: 13 MMOL/L (ref 3–16)
APTT BLD: 33.6 SEC (ref 22.1–36.4)
B-HCG SERPL EIA 3RD IS-ACNC: <5 MIU/ML
BASOPHILS # BLD: 0.1 K/UL (ref 0–0.2)
BASOPHILS NFR BLD: 0.4 %
BLD GP AB SCN SERPL QL: NORMAL
BUN SERPL-MCNC: 11 MG/DL (ref 7–20)
CALCIUM SERPL-MCNC: 8.7 MG/DL (ref 8.3–10.6)
CHLORIDE SERPL-SCNC: 99 MMOL/L (ref 99–110)
CO2 SERPL-SCNC: 21 MMOL/L (ref 21–32)
CREAT SERPL-MCNC: 0.7 MG/DL (ref 0.6–1.1)
DEPRECATED RDW RBC AUTO: 14 % (ref 12.4–15.4)
EOSINOPHIL # BLD: 0 K/UL (ref 0–0.6)
EOSINOPHIL NFR BLD: 0 %
GFR SERPLBLD CREATININE-BSD FMLA CKD-EPI: >90 ML/MIN/{1.73_M2}
GLUCOSE SERPL-MCNC: 131 MG/DL (ref 70–99)
HCT VFR BLD AUTO: 38.4 % (ref 36–48)
HGB BLD-MCNC: 12.8 G/DL (ref 12–16)
INR PPP: 1.15 (ref 0.85–1.15)
LYMPHOCYTES # BLD: 1.4 K/UL (ref 1–5.1)
LYMPHOCYTES NFR BLD: 8.5 %
MAGNESIUM SERPL-MCNC: 2 MG/DL (ref 1.8–2.4)
MCH RBC QN AUTO: 31.1 PG (ref 26–34)
MCHC RBC AUTO-ENTMCNC: 33.3 G/DL (ref 31–36)
MCV RBC AUTO: 93.3 FL (ref 80–100)
MONOCYTES # BLD: 1.2 K/UL (ref 0–1.3)
MONOCYTES NFR BLD: 7.5 %
NEUTROPHILS # BLD: 13.3 K/UL (ref 1.7–7.7)
NEUTROPHILS NFR BLD: 83.6 %
PHOSPHATE SERPL-MCNC: 3.1 MG/DL (ref 2.5–4.9)
PLATELET # BLD AUTO: 174 K/UL (ref 135–450)
PMV BLD AUTO: 9.1 FL (ref 5–10.5)
POTASSIUM SERPL-SCNC: 3.6 MMOL/L (ref 3.5–5.1)
PROTHROMBIN TIME: 14.9 SEC (ref 11.9–14.9)
RBC # BLD AUTO: 4.12 M/UL (ref 4–5.2)
SODIUM SERPL-SCNC: 133 MMOL/L (ref 136–145)
WBC # BLD AUTO: 15.9 K/UL (ref 4–11)

## 2024-08-22 PROCEDURE — 87040 BLOOD CULTURE FOR BACTERIA: CPT

## 2024-08-22 PROCEDURE — 36415 COLL VENOUS BLD VENIPUNCTURE: CPT

## 2024-08-22 PROCEDURE — 84702 CHORIONIC GONADOTROPIN TEST: CPT

## 2024-08-22 PROCEDURE — 6360000002 HC RX W HCPCS

## 2024-08-22 PROCEDURE — 2580000003 HC RX 258

## 2024-08-22 PROCEDURE — 85025 COMPLETE CBC W/AUTO DIFF WBC: CPT

## 2024-08-22 PROCEDURE — 85730 THROMBOPLASTIN TIME PARTIAL: CPT

## 2024-08-22 PROCEDURE — 86900 BLOOD TYPING SEROLOGIC ABO: CPT

## 2024-08-22 PROCEDURE — 83735 ASSAY OF MAGNESIUM: CPT

## 2024-08-22 PROCEDURE — 6370000000 HC RX 637 (ALT 250 FOR IP)

## 2024-08-22 PROCEDURE — 80069 RENAL FUNCTION PANEL: CPT

## 2024-08-22 PROCEDURE — 86850 RBC ANTIBODY SCREEN: CPT

## 2024-08-22 PROCEDURE — 99222 1ST HOSP IP/OBS MODERATE 55: CPT | Performed by: SURGERY

## 2024-08-22 PROCEDURE — 1200000000 HC SEMI PRIVATE

## 2024-08-22 PROCEDURE — 85610 PROTHROMBIN TIME: CPT

## 2024-08-22 PROCEDURE — 2580000003 HC RX 258: Performed by: STUDENT IN AN ORGANIZED HEALTH CARE EDUCATION/TRAINING PROGRAM

## 2024-08-22 PROCEDURE — 99213 OFFICE O/P EST LOW 20 MIN: CPT

## 2024-08-22 PROCEDURE — 86901 BLOOD TYPING SEROLOGIC RH(D): CPT

## 2024-08-22 RX ORDER — ONDANSETRON 2 MG/ML
4 INJECTION INTRAMUSCULAR; INTRAVENOUS EVERY 6 HOURS PRN
Status: DISCONTINUED | OUTPATIENT
Start: 2024-08-22 | End: 2024-08-26 | Stop reason: HOSPADM

## 2024-08-22 RX ORDER — ENOXAPARIN SODIUM 100 MG/ML
40 INJECTION SUBCUTANEOUS DAILY
Status: DISCONTINUED | OUTPATIENT
Start: 2024-08-22 | End: 2024-08-26 | Stop reason: HOSPADM

## 2024-08-22 RX ORDER — ACETAMINOPHEN 500 MG
1000 TABLET ORAL EVERY 6 HOURS
Status: DISCONTINUED | OUTPATIENT
Start: 2024-08-22 | End: 2024-08-26 | Stop reason: HOSPADM

## 2024-08-22 RX ORDER — SODIUM CHLORIDE 9 MG/ML
INJECTION, SOLUTION INTRAVENOUS PRN
Status: DISCONTINUED | OUTPATIENT
Start: 2024-08-22 | End: 2024-08-26 | Stop reason: HOSPADM

## 2024-08-22 RX ORDER — OXYCODONE HYDROCHLORIDE 5 MG/1
5 TABLET ORAL EVERY 4 HOURS PRN
Status: DISCONTINUED | OUTPATIENT
Start: 2024-08-22 | End: 2024-08-26 | Stop reason: HOSPADM

## 2024-08-22 RX ORDER — SODIUM CHLORIDE 0.9 % (FLUSH) 0.9 %
5-40 SYRINGE (ML) INJECTION PRN
Status: DISCONTINUED | OUTPATIENT
Start: 2024-08-22 | End: 2024-08-26 | Stop reason: HOSPADM

## 2024-08-22 RX ORDER — OXYCODONE HYDROCHLORIDE 5 MG/1
10 TABLET ORAL EVERY 4 HOURS PRN
Status: DISCONTINUED | OUTPATIENT
Start: 2024-08-22 | End: 2024-08-26 | Stop reason: HOSPADM

## 2024-08-22 RX ORDER — ONDANSETRON 4 MG/1
4 TABLET, ORALLY DISINTEGRATING ORAL EVERY 8 HOURS PRN
Status: DISCONTINUED | OUTPATIENT
Start: 2024-08-22 | End: 2024-08-26 | Stop reason: HOSPADM

## 2024-08-22 RX ORDER — SODIUM CHLORIDE, SODIUM LACTATE, POTASSIUM CHLORIDE, CALCIUM CHLORIDE 600; 310; 30; 20 MG/100ML; MG/100ML; MG/100ML; MG/100ML
INJECTION, SOLUTION INTRAVENOUS CONTINUOUS
Status: DISCONTINUED | OUTPATIENT
Start: 2024-08-22 | End: 2024-08-26 | Stop reason: HOSPADM

## 2024-08-22 RX ORDER — SODIUM CHLORIDE, SODIUM LACTATE, POTASSIUM CHLORIDE, AND CALCIUM CHLORIDE .6; .31; .03; .02 G/100ML; G/100ML; G/100ML; G/100ML
1000 INJECTION, SOLUTION INTRAVENOUS ONCE
Status: COMPLETED | OUTPATIENT
Start: 2024-08-22 | End: 2024-08-22

## 2024-08-22 RX ORDER — SODIUM CHLORIDE 0.9 % (FLUSH) 0.9 %
5-40 SYRINGE (ML) INJECTION EVERY 12 HOURS SCHEDULED
Status: DISCONTINUED | OUTPATIENT
Start: 2024-08-22 | End: 2024-08-26 | Stop reason: HOSPADM

## 2024-08-22 RX ADMIN — OXYCODONE HYDROCHLORIDE 10 MG: 5 TABLET ORAL at 23:17

## 2024-08-22 RX ADMIN — VANCOMYCIN HYDROCHLORIDE 1750 MG: 10 INJECTION, POWDER, LYOPHILIZED, FOR SOLUTION INTRAVENOUS at 21:11

## 2024-08-22 RX ADMIN — CEFEPIME 2000 MG: 2 INJECTION, POWDER, FOR SOLUTION INTRAVENOUS at 23:23

## 2024-08-22 RX ADMIN — SODIUM CHLORIDE, PRESERVATIVE FREE 10 ML: 5 INJECTION INTRAVENOUS at 20:08

## 2024-08-22 RX ADMIN — ACETAMINOPHEN 1000 MG: 500 TABLET ORAL at 23:17

## 2024-08-22 RX ADMIN — OXYCODONE HYDROCHLORIDE 10 MG: 5 TABLET ORAL at 18:01

## 2024-08-22 RX ADMIN — ENOXAPARIN SODIUM 40 MG: 100 INJECTION SUBCUTANEOUS at 20:07

## 2024-08-22 RX ADMIN — SODIUM CHLORIDE, POTASSIUM CHLORIDE, SODIUM LACTATE AND CALCIUM CHLORIDE 1000 ML: 600; 310; 30; 20 INJECTION, SOLUTION INTRAVENOUS at 17:36

## 2024-08-22 RX ADMIN — SODIUM CHLORIDE, POTASSIUM CHLORIDE, SODIUM LACTATE AND CALCIUM CHLORIDE: 600; 310; 30; 20 INJECTION, SOLUTION INTRAVENOUS at 17:40

## 2024-08-22 RX ADMIN — ACETAMINOPHEN 1000 MG: 500 TABLET ORAL at 20:07

## 2024-08-22 ASSESSMENT — PAIN DESCRIPTION - DESCRIPTORS
DESCRIPTORS: ACHING
DESCRIPTORS: ACHING

## 2024-08-22 ASSESSMENT — PAIN SCALES - GENERAL
PAINLEVEL_OUTOF10: 8
PAINLEVEL_OUTOF10: 7
PAINLEVEL_OUTOF10: 7
PAINLEVEL_OUTOF10: 8
PAINLEVEL_OUTOF10: 8
PAINLEVEL_OUTOF10: 7

## 2024-08-22 ASSESSMENT — PAIN DESCRIPTION - ORIENTATION
ORIENTATION: LEFT
ORIENTATION: LEFT

## 2024-08-22 ASSESSMENT — PAIN - FUNCTIONAL ASSESSMENT
PAIN_FUNCTIONAL_ASSESSMENT: PREVENTS OR INTERFERES SOME ACTIVE ACTIVITIES AND ADLS
PAIN_FUNCTIONAL_ASSESSMENT: ACTIVITIES ARE NOT PREVENTED

## 2024-08-22 ASSESSMENT — PAIN DESCRIPTION - PAIN TYPE: TYPE: ACUTE PAIN

## 2024-08-22 ASSESSMENT — PAIN DESCRIPTION - DIRECTION: RADIATING_TOWARDS: NO

## 2024-08-22 ASSESSMENT — PAIN DESCRIPTION - LOCATION
LOCATION: BREAST
LOCATION: BREAST

## 2024-08-22 ASSESSMENT — PAIN DESCRIPTION - FREQUENCY: FREQUENCY: CONTINUOUS

## 2024-08-22 ASSESSMENT — PAIN DESCRIPTION - ONSET: ONSET: ON-GOING

## 2024-08-22 NOTE — CONSULTS
[facility] -  Clinical Pharmacy Note    Vancomycin - Management by Pharmacy    Consult Date(s): 8/22/24  Consulting Provider(s): Polo Clinton DO    Assessment / Plan  Cellulitis of L Breast - Vancomycin  Concurrent Antimicrobials: cefepime  Day of Vanc Therapy / Ordered Duration: 1 of 7  Current Dosing Method: Bayesian-Guided AUC Dosing  Therapeutic Goal: -600 mg/L*hr  Current Dose / Plan:   Patient SCr = 0.7, appears to be around baseline  Ordered loading dose of 1750 mg for tonight  Will plan to initiate 1000 mg q12h tomorrow morning  AUCss = 447 mg/L*hr, Tr = 11.3 mg/L  Random level in ~48h to confirm kinetics  Will continue to monitor clinical condition and make adjustments to regimen as appropriate.    Thank you for consulting pharmacy,    Smith Ray, PharmD  Main Pharmacy: [id number]  8/22/2024 7:58 PM      Interval update:  therapy initiation     Subjective/Objective:   Kaylin Sanchez is a 44 y.o. female with a PMHx significant for migraine, TMJ, depression, ADHD, mastectomy earlier this year with recent revision who is admitted for cellulitis of left breast.     Ht Readings from Last 1 Encounters:   08/22/24 1.6 m (5' 2.99\")     Wt Readings from Last 1 Encounters:   08/22/24 67.4 kg (148 lb 9.6 oz)     Current & Prior Antimicrobial Regimen(s):  Cefepime (8/22 - current)  Vancomycin  1750 mg x 1 (8/22)  1000 mg q12h (8/23 - )    Vancomycin Level(s) / Doses:    Date Time Dose Type of Level / Level Interpretation                 Note: Serum levels collected for AUC-based dosing may be high if collected in close proximity to the dose administered. This is not necessarily indicative of toxicity.    Cultures & Sensitivities:    Date Site Micro Susceptibility / Result                 Recent Labs     08/22/24  1834   CREATININE 0.7   BUN 11   WBC 15.9*       Estimated Creatinine Clearance: 95 mL/min (based on SCr of 0.7 mg/dL).    Additional Lab Values / Findings of Note:    No results for input(s):

## 2024-08-22 NOTE — PROGRESS NOTES
[facility]    PROCEDURE: 1) Immediate bilateral direct to implant breast reconstruction  2) Insertion of Alloderm Acellular Dermal Matrix (328 cm^2))   3) Intraoperative SPY fluorescent angiography  4) Application of Gaby incisional wound vacuum device  DATE: 6/28/24    Kaylin Sanchez has been recovering poorly since her procedure. Pain has been well controlled without pain medications. Patient presents today with concerns of erythema of left breast and discomfort. She notes she is running a low grade temp and feels achy and has chills.     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  was good, repeat recommended yearly at that point.    HNP (herniated nucleus pulposus), cervical     Medial meniscus tear     left knee    Menorrhagia     Migraine     Paresthesia of hand, bilateral     TMJ (dislocation of temporomandibular joint)     Tobacco use     not smoking as 3/1/24     PSHx:   Past Surgical History:   Procedure Laterality Date    BREAST BIOPSY      BREAST ENHANCEMENT SURGERY Bilateral 4/25/2024    Bilateral breast reconstruction with direct to implant reconstruction with Alloderm, performed by Ky Whitehead MD at [facility] OR    BREAST SURGERY Left 07/05/2022    LEFT BREAST EXCISIONAL BIOPSY performed by Haritha Duckworth MD at [facility] OR    CERVICAL DISCECTOMY      revision with bone graft    CERVICAL SPINE SURGERY  04/27/2018    2 discs removed, cadaver put in and fused x2    ENDOMETRIAL ABLATION  04/19/2013    Jose D&C, Diagnostic Hysteroscopy    KNEE ARTHROSCOPY Right 11/19/2013    KNEE ARTHROSCOPY Right     KNEE SURGERY Left     KNEE SURGERY Right 02/26/2013    LAPAROSCOPY      LEE

## 2024-08-22 NOTE — PROGRESS NOTES
4 Eyes Skin Assessment     NAME:  Kaylin Sanchez  YOB: 1980  MEDICAL RECORD NUMBER:  [id number]    The patient is being assessed for  Admission    I agree that at least one RN has performed a thorough Head to Toe Skin Assessment on the patient. ALL assessment sites listed below have been assessed.      Areas assessed by both nurses:    Head, Face, Ears, Shoulders, Back, Chest, Arms, Elbows, Hands, Sacrum. Buttock, Coccyx, Ischium, Legs. Feet and Heels, and Under Medical Devices         Does the Patient have a Wound? No noted wound(s)       Galen Prevention initiated by RN: No  Wound Care Orders initiated by RN: No    Pressure Injury (Stage 3,4, Unstageable, DTI, NWPT, and Complex wounds) if present, place Wound referral order by RN under : No    New Ostomies, if present place, Ostomy referral order under : No     Nurse 1 eSignature: Electronically signed by Franco Hanson RN on 8/22/24 at 6:26 PM EDT    **SHARE this note so that the co-signing nurse can place an eSignature**    Nurse 2 eSignature: Electronically signed by Diana Lobato RN on 8/22/24 at 6:56 PM EDT

## 2024-08-23 ENCOUNTER — APPOINTMENT (OUTPATIENT)
Dept: ULTRASOUND IMAGING | Age: 44
DRG: 907 | End: 2024-08-23
Attending: SURGERY
Payer: OTHER GOVERNMENT

## 2024-08-23 LAB
ALBUMIN SERPL-MCNC: 3.4 G/DL (ref 3.4–5)
ANION GAP SERPL CALCULATED.3IONS-SCNC: 8 MMOL/L (ref 3–16)
BASOPHILS # BLD: 0.1 K/UL (ref 0–0.2)
BASOPHILS NFR BLD: 0.4 %
BUN SERPL-MCNC: 11 MG/DL (ref 7–20)
CALCIUM SERPL-MCNC: 8.4 MG/DL (ref 8.3–10.6)
CHLORIDE SERPL-SCNC: 106 MMOL/L (ref 99–110)
CO2 SERPL-SCNC: 25 MMOL/L (ref 21–32)
CREAT SERPL-MCNC: 0.7 MG/DL (ref 0.6–1.1)
DEPRECATED RDW RBC AUTO: 14.8 % (ref 12.4–15.4)
EOSINOPHIL # BLD: 0 K/UL (ref 0–0.6)
EOSINOPHIL NFR BLD: 0.3 %
GFR SERPLBLD CREATININE-BSD FMLA CKD-EPI: >90 ML/MIN/{1.73_M2}
GLUCOSE SERPL-MCNC: 102 MG/DL (ref 70–99)
HCT VFR BLD AUTO: 36.1 % (ref 36–48)
HGB BLD-MCNC: 12 G/DL (ref 12–16)
LYMPHOCYTES # BLD: 2.3 K/UL (ref 1–5.1)
LYMPHOCYTES NFR BLD: 15.8 %
MAGNESIUM SERPL-MCNC: 2.1 MG/DL (ref 1.8–2.4)
MCH RBC QN AUTO: 31 PG (ref 26–34)
MCHC RBC AUTO-ENTMCNC: 33.1 G/DL (ref 31–36)
MCV RBC AUTO: 93.7 FL (ref 80–100)
MONOCYTES # BLD: 1 K/UL (ref 0–1.3)
MONOCYTES NFR BLD: 6.7 %
NEUTROPHILS # BLD: 11 K/UL (ref 1.7–7.7)
NEUTROPHILS NFR BLD: 76.8 %
PHOSPHATE SERPL-MCNC: 4.1 MG/DL (ref 2.5–4.9)
PLATELET # BLD AUTO: 149 K/UL (ref 135–450)
PMV BLD AUTO: 9.5 FL (ref 5–10.5)
POTASSIUM SERPL-SCNC: 3.9 MMOL/L (ref 3.5–5.1)
RBC # BLD AUTO: 3.85 M/UL (ref 4–5.2)
SODIUM SERPL-SCNC: 139 MMOL/L (ref 136–145)
WBC # BLD AUTO: 14.3 K/UL (ref 4–11)

## 2024-08-23 PROCEDURE — 6360000002 HC RX W HCPCS

## 2024-08-23 PROCEDURE — 83735 ASSAY OF MAGNESIUM: CPT

## 2024-08-23 PROCEDURE — 85025 COMPLETE CBC W/AUTO DIFF WBC: CPT

## 2024-08-23 PROCEDURE — 6370000000 HC RX 637 (ALT 250 FOR IP)

## 2024-08-23 PROCEDURE — 2580000003 HC RX 258

## 2024-08-23 PROCEDURE — 80069 RENAL FUNCTION PANEL: CPT

## 2024-08-23 PROCEDURE — 1200000000 HC SEMI PRIVATE

## 2024-08-23 PROCEDURE — 36415 COLL VENOUS BLD VENIPUNCTURE: CPT

## 2024-08-23 PROCEDURE — 99255 IP/OBS CONSLTJ NEW/EST HI 80: CPT | Performed by: INTERNAL MEDICINE

## 2024-08-23 PROCEDURE — 76641 ULTRASOUND BREAST COMPLETE: CPT

## 2024-08-23 PROCEDURE — 6370000000 HC RX 637 (ALT 250 FOR IP): Performed by: STUDENT IN AN ORGANIZED HEALTH CARE EDUCATION/TRAINING PROGRAM

## 2024-08-23 PROCEDURE — 6360000002 HC RX W HCPCS: Performed by: INTERNAL MEDICINE

## 2024-08-23 PROCEDURE — 2580000003 HC RX 258: Performed by: INTERNAL MEDICINE

## 2024-08-23 RX ORDER — DIPHENHYDRAMINE HCL 25 MG
25 TABLET ORAL EVERY 6 HOURS PRN
Status: DISCONTINUED | OUTPATIENT
Start: 2024-08-23 | End: 2024-08-26 | Stop reason: HOSPADM

## 2024-08-23 RX ORDER — IBUPROFEN 600 MG/1
600 TABLET, FILM COATED ORAL ONCE
Status: COMPLETED | OUTPATIENT
Start: 2024-08-23 | End: 2024-08-23

## 2024-08-23 RX ADMIN — ACETAMINOPHEN 1000 MG: 500 TABLET ORAL at 18:21

## 2024-08-23 RX ADMIN — SODIUM CHLORIDE 1250 MG: 9 INJECTION, SOLUTION INTRAVENOUS at 22:35

## 2024-08-23 RX ADMIN — ACETAMINOPHEN 1000 MG: 500 TABLET ORAL at 11:43

## 2024-08-23 RX ADMIN — SODIUM CHLORIDE 1250 MG: 9 INJECTION, SOLUTION INTRAVENOUS at 10:28

## 2024-08-23 RX ADMIN — SODIUM CHLORIDE, PRESERVATIVE FREE 5 ML: 5 INJECTION INTRAVENOUS at 09:07

## 2024-08-23 RX ADMIN — OXYCODONE HYDROCHLORIDE 10 MG: 5 TABLET ORAL at 22:43

## 2024-08-23 RX ADMIN — ENOXAPARIN SODIUM 40 MG: 100 INJECTION SUBCUTANEOUS at 18:21

## 2024-08-23 RX ADMIN — CEFEPIME 2000 MG: 2 INJECTION, POWDER, FOR SOLUTION INTRAVENOUS at 16:58

## 2024-08-23 RX ADMIN — SODIUM CHLORIDE, POTASSIUM CHLORIDE, SODIUM LACTATE AND CALCIUM CHLORIDE: 600; 310; 30; 20 INJECTION, SOLUTION INTRAVENOUS at 06:38

## 2024-08-23 RX ADMIN — OXYCODONE HYDROCHLORIDE 10 MG: 5 TABLET ORAL at 04:50

## 2024-08-23 RX ADMIN — OXYCODONE HYDROCHLORIDE 10 MG: 5 TABLET ORAL at 19:05

## 2024-08-23 RX ADMIN — DIPHENHYDRAMINE HYDROCHLORIDE 25 MG: 25 TABLET ORAL at 22:32

## 2024-08-23 RX ADMIN — ACETAMINOPHEN 1000 MG: 500 TABLET ORAL at 04:50

## 2024-08-23 RX ADMIN — SODIUM CHLORIDE 30 ML: 9 INJECTION, SOLUTION INTRAVENOUS at 10:26

## 2024-08-23 RX ADMIN — OXYCODONE HYDROCHLORIDE 10 MG: 5 TABLET ORAL at 13:02

## 2024-08-23 RX ADMIN — DIPHENHYDRAMINE HYDROCHLORIDE 25 MG: 25 TABLET ORAL at 13:10

## 2024-08-23 RX ADMIN — OXYCODONE HYDROCHLORIDE 10 MG: 5 TABLET ORAL at 09:00

## 2024-08-23 RX ADMIN — CEFEPIME 2000 MG: 2 INJECTION, POWDER, FOR SOLUTION INTRAVENOUS at 08:21

## 2024-08-23 RX ADMIN — IBUPROFEN 600 MG: 600 TABLET, FILM COATED ORAL at 19:07

## 2024-08-23 ASSESSMENT — PAIN - FUNCTIONAL ASSESSMENT
PAIN_FUNCTIONAL_ASSESSMENT: ACTIVITIES ARE NOT PREVENTED
PAIN_FUNCTIONAL_ASSESSMENT: PREVENTS OR INTERFERES SOME ACTIVE ACTIVITIES AND ADLS
PAIN_FUNCTIONAL_ASSESSMENT: PREVENTS OR INTERFERES SOME ACTIVE ACTIVITIES AND ADLS
PAIN_FUNCTIONAL_ASSESSMENT: ACTIVITIES ARE NOT PREVENTED
PAIN_FUNCTIONAL_ASSESSMENT: ACTIVITIES ARE NOT PREVENTED

## 2024-08-23 ASSESSMENT — PAIN DESCRIPTION - ONSET
ONSET: ON-GOING

## 2024-08-23 ASSESSMENT — PAIN SCALES - GENERAL
PAINLEVEL_OUTOF10: 10
PAINLEVEL_OUTOF10: 8
PAINLEVEL_OUTOF10: 6
PAINLEVEL_OUTOF10: 7
PAINLEVEL_OUTOF10: 7
PAINLEVEL_OUTOF10: 1
PAINLEVEL_OUTOF10: 0
PAINLEVEL_OUTOF10: 5
PAINLEVEL_OUTOF10: 4
PAINLEVEL_OUTOF10: 7
PAINLEVEL_OUTOF10: 7
PAINLEVEL_OUTOF10: 5

## 2024-08-23 ASSESSMENT — PAIN DESCRIPTION - DESCRIPTORS
DESCRIPTORS: ACHING
DESCRIPTORS: SHOOTING;ACHING;TENDER
DESCRIPTORS: ACHING;DISCOMFORT;PRESSURE
DESCRIPTORS: DISCOMFORT;ACHING;JABBING
DESCRIPTORS: ACHING
DESCRIPTORS: STABBING;SHOOTING;TENDER

## 2024-08-23 ASSESSMENT — PAIN DESCRIPTION - FREQUENCY
FREQUENCY: CONTINUOUS
FREQUENCY: CONTINUOUS
FREQUENCY: INTERMITTENT
FREQUENCY: CONTINUOUS

## 2024-08-23 ASSESSMENT — PAIN DESCRIPTION - DIRECTION
RADIATING_TOWARDS: NO
RADIATING_TOWARDS: NO

## 2024-08-23 ASSESSMENT — PAIN DESCRIPTION - LOCATION
LOCATION: BREAST
LOCATION: HEAD

## 2024-08-23 ASSESSMENT — PAIN DESCRIPTION - PAIN TYPE
TYPE: ACUTE PAIN
TYPE: ACUTE PAIN
TYPE: SURGICAL PAIN
TYPE: ACUTE PAIN

## 2024-08-23 ASSESSMENT — PAIN DESCRIPTION - ORIENTATION
ORIENTATION: LEFT
ORIENTATION: MID
ORIENTATION: LEFT

## 2024-08-23 NOTE — PROGRESS NOTES
4 Eyes Skin Assessment     NAME:  Kaylin Sanchez  YOB: 1980  MEDICAL RECORD NUMBER:  [id number]    The patient is being assessed for  Admission    I agree that at least one RN has performed a thorough Head to Toe Skin Assessment on the patient. ALL assessment sites listed below have been assessed.      Areas assessed by both nurses:    Head, Face, Ears, Shoulders, Back, Chest, Arms, Elbows, Hands, Sacrum. Buttock, Coccyx, Ischium, Legs. Feet and Heels, Under Medical Devices , and Other    Blanchable redness on B/L heels.   Redness on L chest         Does the Patient have a Wound? No noted wound(s)       Galen Prevention initiated by RN: No  Wound Care Orders initiated by RN: No    Pressure Injury (Stage 3,4, Unstageable, DTI, NWPT, and Complex wounds) if present, place Wound referral order by RN under : No    New Ostomies, if present place, Ostomy referral order under : No     Nurse 1 eSignature: Electronically signed by Louann Dewitt RN on 8/22/24 at 9:46 PM EDT    **SHARE this note so that the co-signing nurse can place an eSignature**    Nurse 2 eSignature: Electronically signed by Mary Serna RN on 8/23/24 at 6:42 AM EDT

## 2024-08-23 NOTE — CARE COORDINATION
Case Management Assessment  Initial Evaluation    Date/Time of Evaluation: 8/23/2024 11:46 AM  Assessment Completed by: Anjali Lujan RN    If patient is discharged prior to next notation, then this note serves as note for discharge by case management.    Patient Name: Kaylin Sanchez                   YOB: 1980  Diagnosis: Cellulitis of left breast [N61.0]  Cellulitis of breast [N61.0]                   Date / Time: 8/22/2024  3:19 PM    Patient Admission Status: Inpatient   Readmission Risk (Low < 19, Mod (19-27), High > 27): Readmission Risk Score: 5    Current PCP: Candelario Cabello MD  PCP verified by CM? No    Chart Reviewed: Yes      History Provided by: Patient  Patient Orientation: Alert and Oriented    Patient Cognition: Alert    Hospitalization in the last 30 days (Readmission):  No    If yes, Readmission Assessment in  Navigator will be completed.    Advance Directives:      Code Status: Full Code   Patient's Primary Decision Maker is: Legal Next of Kin      Discharge Planning:    Patient lives with: Spouse/Significant Other Type of Home: House  Primary Care Giver: Self  Patient Support Systems include: Spouse/Significant Other   Current Financial resources: None  Current community resources: None  Current services prior to admission: None            Current DME:              Type of Home Care services:  None    ADLS  Prior functional level: Independent in ADLs/IADLs  Current functional level: Independent in ADLs/IADLs    PT AM-PAC:   /24  OT AM-PAC:   /24    Family can provide assistance at DC: Yes  Would you like Case Management to discuss the discharge plan with any other family members/significant others, and if so, who? No  Plans to Return to Present Housing: Yes  Other Identified Issues/Barriers to RETURNING to current housing: none   Potential Assistance needed at discharge: N/A            Potential DME:    Patient expects to discharge to: House  Plan for transportation at  discharge: Self    Financial    Payor:  EAST / Plan:  EAST SELECT / Product Type: *No Product type* /     Does insurance require precert for SNF: Yes    Potential assistance Purchasing Medications: No  Meds-to-Beds request:        [facility] - [place], [place] - [address] - P [phone] - F [phone]  [address] [facility] [address]  Phone: [phone] Fax: [phone]    [id number] - [place], [place] - [address] - P [phone] - F [phone]  [address]  [facility] [address]  Phone: [phone] Fax: [phone]    [id number] [place], [place] - [address] - P [phone] - F [phone]  [address] [address]  Phone: [phone] Fax: [phone]      Notes:    Factors facilitating achievement of predicted outcomes: Pleasant    Barriers to discharge: Pain    Additional Case Management Notes:   Patient is from home with spouse, independent pta.  Patient is currently on IV abx with ID consult.  CM explained that long term IV abx is a possibility and would send referrals on her behalf.  Patient has no preference to agency.  Referral to [facility].    [facility] Network  [facility] no staffing in the area.    The Plan for Transition of Care is related to the following treatment goals of Cellulitis of left breast [N61.0]  Cellulitis of breast [N61.0]    IF APPLICABLE: The Patient and/or patient representative Kaylin and her family were provided with a choice of provider and agrees with the discharge plan. Freedom of choice list with basic dialogue that supports the patient's individualized plan of care/goals and shares the quality data associated with the providers was provided to: Patient   Patient Representative Name:       The Patient and/or Patient Representative Agree with the Discharge Plan? Yes    Anjali Lujan RN  Case Management Department  Ph: [phone] Fax: [phone]

## 2024-08-23 NOTE — PROGRESS NOTES
Bedside report done with Crystal. Pt In bed, daughters at the bedside. Pt c/o migraine at this time. Pt denies needs at this time. Bed alarm on, wheels locked, bed in lowest position, side rails up 2/4, nonskid socks on, call light and bedside table in reach. Will

## 2024-08-23 NOTE — PLAN OF CARE
Problem: Discharge Planning  Goal: Discharge to home or other facility with appropriate resources  Outcome: Progressing  Flowsheets (Taken 8/22/2024 1950)  Discharge to home or other facility with appropriate resources:   Identify barriers to discharge with patient and caregiver   Arrange for needed discharge resources and transportation as appropriate   Identify discharge learning needs (meds, wound care, etc)     Problem: Pain  Goal: Verbalizes/displays adequate comfort level or baseline comfort level  Outcome: Progressing  Note: Numeric pain rating scale used for assessment. Pain has been controlled with MAR      Problem: Safety - Adult  Goal: Free from fall injury  Outcome: Progressing  Note: All fall precautions in place, call light within reach, free from fall injury.

## 2024-08-23 NOTE — CONSULTS
[facility] -  Clinical Pharmacy Note    Vancomycin - Management by Pharmacy    Consult Date(s): 8/22/24  Consulting Provider(s): Polo Clinton DO    Assessment / Plan  Cellulitis of L Breast s/p breast reconstruction - Vancomycin  Concurrent Antimicrobials: cefepime  Day of Vanc Therapy / Ordered Duration: 2 of 7  Current Dosing Method: Bayesian-Guided AUC Dosing  Therapeutic Goal: -600 mg/L*hr  Current Dose / Plan:   Patient SCr = 0.7 - about baseline  Received 1750 mg IV load last evening, then started on 1000 mg IV q12h  Estimated AUCss ~ 456 mg/L*hr and troughss ~ 11.7 mg/L on current regimen- but probability of achieving therapeutic AUC is slightly below optimal threshold and want to target higher AUC for possible dagoberto-prosthetic infection s/p breast reconstruction  Will increase today to 1250 mg IV q12h  Estimated AUCss ~ 568 mg/L*hr and troughss ~ 14.5 mg/L on new regimen with higher probability of achieving goal AUC  Level ordered tomorrow am to assess kinetic estimates and guide further dosing  Will continue to monitor clinical condition and make adjustments to regimen as appropriate.  ADDENDUM:  Pt reported itching after infusion started. 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 Dr Burton is OK with continuing vancomycin but at 1/2 of normal rate.   1250 mg dose in 250 ml should run over 3 hours.    Thank you for consulting pharmacy,  Please call with any questions    Mandi Lock  Pharm.D. BCPS  [phone] (Main Pharmacy)        Interval update:  Now afebrile after Tmax 102.1 last zaid, tachycardia from last zaid resolved; BPs remain soft.. WBC trending down a bit (15.9--> 14.3).   Pt reported improvement in breast pain this am.  Surgery discussing possible surgical intervention    Subjective/Objective:   Kaylin Sanchez is a 44 y.o. female with a PMHx significant for migraine, TMJ, depression, ADHD, Breast CA s/p mastectomy 4/25/24 with  DTI reconstruction 6/28/24,  Admitted

## 2024-08-23 NOTE — PROGRESS NOTES
Patient is alert and orient X4, VSS, patient is up independent, patient is voiding per BRP, patient did go down for an ultra sound of the left Breast this afternoon, patient does have on fall socks and call light and tray table are in reach.

## 2024-08-23 NOTE — PROGRESS NOTES
Plastic Surgery   Daily Progress Note  Patient: Kaylin Sanchez      CC: breast discomfort and redness     SUBJECTIVE:   Patient rested well overnight. Reports of improved left breast pain    ROS:   A 14 point review of systems was conducted, significant findings as noted above. All other systems negative.    OBJECTIVE:    PHYSICAL EXAM:    Vitals:    08/22/24 2347 08/23/24 0450 08/23/24 0455 08/23/24 0520   BP:       Pulse:   82    Resp: 16 16 16 16   Temp:   97.9 °F (36.6 °C)    TempSrc:   Oral    SpO2:   98%    Weight:       Height:           General appearance: alert, uncomfortable  Eyes: No scleral icterus, EOM grossly intact  Neck: trachea midline,neck supple  Chest/Lungs: normal effort with no accessory muscle use on RA, left breast with improving erythema, mildly tender to palpation. No drainage or breakdown of incision noted.  Cardiovascular: tachycardic  Extremities: no edema, no cyanosis  Neuro: A&Ox3, no focal deficits, sensation intact    ASSESSMENT & PLAN:   Kaylin Sanchez is a 44 y.o. female with Hx of prior bilateral mastectomy with DTI reconstruction on 6/38/24. She was directly admitted 8/22 from clinic due to fever, left breast discomfort concerning for breast cellulitis/possible periprosthetic infection.      - Will discuss with team need for surgical intervention today  - Continue NPO pending possible surgical intervention today  - Continue Pain control  - Fluid resuscitation, monitor vitals and urine output  - Continue IV Abx: Vanc/Cefepime  - F/u Left breast ultrasound ordered     Kayla Lewis MD  PGY1, General Surgery  08/23/24  6:45 AM  [phone]

## 2024-08-23 NOTE — H&P
Plastic Surgery   Resident Consult Note    Reason for Consult: breast discomfort and redness    History of Present Illness:   Kaylin Sanchez is a 44 y.o. female with Hx of prior bilateral mastectomy with DTI reconstruction on 6/38/24. Patient was seen in plastic surgery clinic due to left breast redness and fever. She was then directly admitted to the hospital due to concern for breast cellulitis. Patient reported feeling fever and chills. She notes her left breast is exquisitely tender.     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  was good, repeat recommended yearly at that point.    HNP (herniated nucleus pulposus), cervical     Medial meniscus tear     left knee    Menorrhagia     Migraine     Paresthesia of hand, bilateral     TMJ (dislocation of temporomandibular joint)     Tobacco use     not smoking as 3/1/24       Past Surgical History:        Procedure Laterality Date    BREAST BIOPSY      BREAST ENHANCEMENT SURGERY Bilateral 4/25/2024    Bilateral breast reconstruction with direct to implant reconstruction with Alloderm, performed by Ky Whitehead MD at [facility] OR    BREAST SURGERY Left 07/05/2022    LEFT BREAST EXCISIONAL BIOPSY performed by Haritha Duckworth MD at [facility] OR    CERVICAL DISCECTOMY      revision with bone graft    CERVICAL SPINE SURGERY  04/27/2018    2 discs removed, cadaver put in and fused x2    ENDOMETRIAL ABLATION  04/19/2013    [facility], Diagnostic Hysteroscopy    KNEE ARTHROSCOPY Right 11/19/2013    KNEE ARTHROSCOPY Right     KNEE SURGERY Left     KNEE SURGERY Right 02/26/2013    LAPAROSCOPY      LEEP      MASTECTOMY Bilateral 4/25/2024    BILATERAL NIPPLE SPARING MASTECTOMIES performed by

## 2024-08-23 NOTE — CONSULTS
Infectious Diseases Inpatient Consult Note      Reason for Consult:   Left breast cellulitis  Requesting Physician:   Ky Whitehead MD   Primary Care Physician:  Destiney, Candelario CARBAJAL MD  History Obtained From:   patient and Epic    CHIEF COMPLAINT:   No chief complaint on file.      HISTORY OF PRESENT ILLNESS:    44 y.o female with PMH of ADHD, anxiety, depression, fibroadenoma Lt breast, migraines, extensive family history of breast cancers in first and second-degree relatives, bilateral preventative mastectomy with DTI reconstruction in April 2024, presented to the hospital from outpatient plastic surgery yesterday.  She started having fever and chills on 21st, Tmax up to 105 on Wednesday, she went to plastic surgery clinic and they sent her here for management of left breast cellulitis.  She has had on and off history of left breast redness previously and it was managed by outpatient plastic surgery team  but, but this is the first time she is having fever with chills.  She also history of some draining of pus in the past after the surgery, and it was managed without antibiotic use.   On presentation she was febrile and tachycardic and was given tylenol. She is having headaches. Denies any chest pain, SOB, abdominal pain and bowel changes.   Gen surgery following her, planning the possible surgical intervention today. She was put on Cefepime and Vancomycin.  She has a hx of rash with penicin as a teenager.    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  was good, repeat recommended yearly at that point.    HNP (herniated nucleus pulposus), cervical     Medial meniscus tear     left knee    Menorrhagia      18

## 2024-08-23 NOTE — PROGRESS NOTES
Pt [place] the [place]  [place]. VS taken and recorded. Pt is febrile, tachycardia documented in the flowsheets and PRN Tylenol given per order. Pt kept on tele monitoring. Pt is alert and oriented X4. 4 eyes, head to toe and admission assessment done. Pt has passed swallow screening. Pt oriented to the unit, call light. All fall precautions in place, call light within reach, free from fall injury. Plan of care ongoing.

## 2024-08-23 NOTE — PLAN OF CARE
Problem: Discharge Planning  Goal: Discharge to home or other facility with appropriate resources  8/23/2024 0643 by Hazel Galaviz, RN  Outcome: Progressing   Case management is consulted for discharge.  Problem: Pain  Goal: Verbalizes/displays adequate comfort level or baseline comfort level  8/23/2024 0643 by Hazel Galaviz, RN  Outcome: Progressing   Pain medications are being managed by the MAR,  Problem: Safety - Adult  Goal: Free from fall injury  8/23/2024 0643 by Hazel Galaviz, RN  Outcome: Progressing   Patient has all standard fall precautions in place.

## 2024-08-24 LAB
ALBUMIN SERPL-MCNC: 3.1 G/DL (ref 3.4–5)
ANION GAP SERPL CALCULATED.3IONS-SCNC: 7 MMOL/L (ref 3–16)
BASOPHILS # BLD: 0 K/UL (ref 0–0.2)
BASOPHILS NFR BLD: 0.4 %
BUN SERPL-MCNC: 9 MG/DL (ref 7–20)
CALCIUM SERPL-MCNC: 8.1 MG/DL (ref 8.3–10.6)
CHLORIDE SERPL-SCNC: 108 MMOL/L (ref 99–110)
CO2 SERPL-SCNC: 24 MMOL/L (ref 21–32)
CREAT SERPL-MCNC: 0.6 MG/DL (ref 0.6–1.1)
DEPRECATED RDW RBC AUTO: 14.7 % (ref 12.4–15.4)
EOSINOPHIL # BLD: 0.1 K/UL (ref 0–0.6)
EOSINOPHIL NFR BLD: 0.6 %
GFR SERPLBLD CREATININE-BSD FMLA CKD-EPI: >90 ML/MIN/{1.73_M2}
GLUCOSE SERPL-MCNC: 100 MG/DL (ref 70–99)
HCT VFR BLD AUTO: 32.7 % (ref 36–48)
HGB BLD-MCNC: 11.1 G/DL (ref 12–16)
LYMPHOCYTES # BLD: 1.9 K/UL (ref 1–5.1)
LYMPHOCYTES NFR BLD: 21.9 %
MAGNESIUM SERPL-MCNC: 2.1 MG/DL (ref 1.8–2.4)
MCH RBC QN AUTO: 31.7 PG (ref 26–34)
MCHC RBC AUTO-ENTMCNC: 34 G/DL (ref 31–36)
MCV RBC AUTO: 93 FL (ref 80–100)
MONOCYTES # BLD: 0.7 K/UL (ref 0–1.3)
MONOCYTES NFR BLD: 7.4 %
NEUTROPHILS # BLD: 6.1 K/UL (ref 1.7–7.7)
NEUTROPHILS NFR BLD: 69.7 %
PHOSPHATE SERPL-MCNC: 4.2 MG/DL (ref 2.5–4.9)
PLATELET # BLD AUTO: 142 K/UL (ref 135–450)
PMV BLD AUTO: 9.3 FL (ref 5–10.5)
POTASSIUM SERPL-SCNC: 4 MMOL/L (ref 3.5–5.1)
RBC # BLD AUTO: 3.52 M/UL (ref 4–5.2)
SODIUM SERPL-SCNC: 139 MMOL/L (ref 136–145)
VANCOMYCIN SERPL-MCNC: 17.1 UG/ML
WBC # BLD AUTO: 8.8 K/UL (ref 4–11)

## 2024-08-24 PROCEDURE — 6370000000 HC RX 637 (ALT 250 FOR IP): Performed by: STUDENT IN AN ORGANIZED HEALTH CARE EDUCATION/TRAINING PROGRAM

## 2024-08-24 PROCEDURE — 85025 COMPLETE CBC W/AUTO DIFF WBC: CPT

## 2024-08-24 PROCEDURE — 1200000000 HC SEMI PRIVATE

## 2024-08-24 PROCEDURE — 36415 COLL VENOUS BLD VENIPUNCTURE: CPT

## 2024-08-24 PROCEDURE — 6360000002 HC RX W HCPCS

## 2024-08-24 PROCEDURE — 83735 ASSAY OF MAGNESIUM: CPT

## 2024-08-24 PROCEDURE — 2580000003 HC RX 258: Performed by: INTERNAL MEDICINE

## 2024-08-24 PROCEDURE — 6360000002 HC RX W HCPCS: Performed by: INTERNAL MEDICINE

## 2024-08-24 PROCEDURE — 6370000000 HC RX 637 (ALT 250 FOR IP)

## 2024-08-24 PROCEDURE — 80202 ASSAY OF VANCOMYCIN: CPT

## 2024-08-24 PROCEDURE — 80069 RENAL FUNCTION PANEL: CPT

## 2024-08-24 PROCEDURE — 2580000003 HC RX 258

## 2024-08-24 PROCEDURE — 99232 SBSQ HOSP IP/OBS MODERATE 35: CPT | Performed by: SURGERY

## 2024-08-24 RX ORDER — VALACYCLOVIR HYDROCHLORIDE 500 MG/1
500 TABLET, FILM COATED ORAL DAILY
Status: DISCONTINUED | OUTPATIENT
Start: 2024-08-24 | End: 2024-08-26

## 2024-08-24 RX ORDER — CETIRIZINE HYDROCHLORIDE 10 MG/1
10 TABLET ORAL ONCE
Status: COMPLETED | OUTPATIENT
Start: 2024-08-24 | End: 2024-08-24

## 2024-08-24 RX ADMIN — CEFEPIME 2000 MG: 2 INJECTION, POWDER, FOR SOLUTION INTRAVENOUS at 01:12

## 2024-08-24 RX ADMIN — ACETAMINOPHEN 1000 MG: 500 TABLET ORAL at 12:02

## 2024-08-24 RX ADMIN — ACETAMINOPHEN 1000 MG: 500 TABLET ORAL at 01:12

## 2024-08-24 RX ADMIN — CETIRIZINE HYDROCHLORIDE 10 MG: 10 TABLET, FILM COATED ORAL at 16:09

## 2024-08-24 RX ADMIN — ACETAMINOPHEN 1000 MG: 500 TABLET ORAL at 05:49

## 2024-08-24 RX ADMIN — CEFEPIME 2000 MG: 2 INJECTION, POWDER, FOR SOLUTION INTRAVENOUS at 16:16

## 2024-08-24 RX ADMIN — VALACYCLOVIR HYDROCHLORIDE 500 MG: 500 TABLET, FILM COATED ORAL at 20:37

## 2024-08-24 RX ADMIN — SODIUM CHLORIDE 1250 MG: 9 INJECTION, SOLUTION INTRAVENOUS at 11:55

## 2024-08-24 RX ADMIN — ACETAMINOPHEN 1000 MG: 500 TABLET ORAL at 18:17

## 2024-08-24 RX ADMIN — SODIUM CHLORIDE, PRESERVATIVE FREE 10 ML: 5 INJECTION INTRAVENOUS at 11:58

## 2024-08-24 RX ADMIN — OXYCODONE HYDROCHLORIDE 10 MG: 5 TABLET ORAL at 16:08

## 2024-08-24 RX ADMIN — OXYCODONE HYDROCHLORIDE 10 MG: 5 TABLET ORAL at 05:49

## 2024-08-24 RX ADMIN — OXYCODONE HYDROCHLORIDE 10 MG: 5 TABLET ORAL at 10:09

## 2024-08-24 RX ADMIN — DIPHENHYDRAMINE HYDROCHLORIDE 25 MG: 25 TABLET ORAL at 22:19

## 2024-08-24 RX ADMIN — CEFEPIME 2000 MG: 2 INJECTION, POWDER, FOR SOLUTION INTRAVENOUS at 08:54

## 2024-08-24 RX ADMIN — DIPHENHYDRAMINE HYDROCHLORIDE 25 MG: 25 TABLET ORAL at 08:48

## 2024-08-24 RX ADMIN — SODIUM CHLORIDE 1250 MG: 9 INJECTION, SOLUTION INTRAVENOUS at 22:30

## 2024-08-24 RX ADMIN — OXYCODONE HYDROCHLORIDE 10 MG: 5 TABLET ORAL at 20:37

## 2024-08-24 ASSESSMENT — PAIN SCALES - GENERAL
PAINLEVEL_OUTOF10: 7
PAINLEVEL_OUTOF10: 6
PAINLEVEL_OUTOF10: 7
PAINLEVEL_OUTOF10: 6
PAINLEVEL_OUTOF10: 6
PAINLEVEL_OUTOF10: 0
PAINLEVEL_OUTOF10: 7
PAINLEVEL_OUTOF10: 7
PAINLEVEL_OUTOF10: 6
PAINLEVEL_OUTOF10: 8
PAINLEVEL_OUTOF10: 7

## 2024-08-24 ASSESSMENT — PAIN - FUNCTIONAL ASSESSMENT
PAIN_FUNCTIONAL_ASSESSMENT: ACTIVITIES ARE NOT PREVENTED

## 2024-08-24 ASSESSMENT — PAIN DESCRIPTION - ORIENTATION
ORIENTATION: LEFT
ORIENTATION: MID
ORIENTATION: LEFT
ORIENTATION: MID
ORIENTATION: LEFT
ORIENTATION: MID

## 2024-08-24 ASSESSMENT — PAIN DESCRIPTION - ONSET
ONSET: ON-GOING

## 2024-08-24 ASSESSMENT — PAIN DESCRIPTION - LOCATION
LOCATION: BREAST
LOCATION: HEAD
LOCATION: BACK
LOCATION: HEAD
LOCATION: HEAD
LOCATION: BREAST
LOCATION: BACK
LOCATION: BREAST

## 2024-08-24 ASSESSMENT — PAIN DESCRIPTION - DESCRIPTORS
DESCRIPTORS: ACHING
DESCRIPTORS: ACHING;BURNING;DISCOMFORT
DESCRIPTORS: ACHING;DISCOMFORT;HEAVINESS
DESCRIPTORS: ACHING;HEAVINESS;POUNDING
DESCRIPTORS: ACHING
DESCRIPTORS: DISCOMFORT;ACHING
DESCRIPTORS: ACHING

## 2024-08-24 ASSESSMENT — PAIN DESCRIPTION - PAIN TYPE
TYPE: ACUTE PAIN

## 2024-08-24 ASSESSMENT — PAIN DESCRIPTION - FREQUENCY
FREQUENCY: CONTINUOUS

## 2024-08-24 NOTE — PLAN OF CARE
Problem: Discharge Planning  Goal: Discharge to home or other facility with appropriate resources  Recent Flowsheet Documentation  Taken 8/23/2024 1600 by Hazel Galaviz RN  Discharge to home or other facility with appropriate resources: Identify barriers to discharge with patient and caregiver  Taken 8/23/2024 1200 by Hazel Galaviz RN  Discharge to home or other facility with appropriate resources: Identify barriers to discharge with patient and caregiver  Taken 8/23/2024 0800 by Hazel Galaviz RN  Discharge to home or other facility with appropriate resources: Identify barriers to discharge with patient and caregiver     Problem: Pain  Goal: Verbalizes/displays adequate comfort level or baseline comfort level  8/23/2024 2039 by Latricia Palomino RN  Outcome: Progressing  Note: Pt rates pain 2/10 at this time. Pt taking PO pain medication and muscle relaxers, in addition to ice packs. Will continue to rate pain with the 0-10 pain rating scale.       Problem: Safety - Adult  Goal: Free from fall injury  8/23/2024 2039 by Latricia Palomino RN  Outcome: Progressing  Note: Pt will remain free of falls for the duration of the shift. Pt is A/O x4  and uses the call light appropriately for needs. Pt is SBA with GONZÁLEZ and HERMELINDO. Bed alarm on, wheels locked, bed in lowest position, side rails up 2/4, nonskid socks on, call light and bedside table in reach.      Problem: Musculoskeletal - Adult  Goal: Return mobility to safest level of function  Outcome: Progressing  Note: Pt regained movement in his RLE post op. Pt is SBA with HERMELINDO CLAUDIO.      Problem: Genitourinary - Adult  Goal: Absence of urinary retention  Outcome: Progressing  Note: Pt voiding adequately     Problem: Infection - Adult  Goal: Absence of infection at discharge  Outcome: Progressing  Note: No s/s of infection.

## 2024-08-24 NOTE — PLAN OF CARE
Problem: Discharge Planning  Goal: Discharge to home or other facility with appropriate resources  Outcome: Progressing   Case management is consulted for discharge.  Problem: Pain  Goal: Verbalizes/displays adequate comfort level or baseline comfort level  8/24/2024 0733 by Hazel Galaviz RN  Outcome: Progressing   Pain medications are being managed by the MAR.  Problem: Safety - Adult  Goal: Free from fall injury  8/24/2024 0733 by Hazel Galaviz, RN  Outcome: Progressing   Patient is wearing non skid socks and fall sign is posted.  Problem: Infection - Adult  Goal: Absence of infection at discharge  8/24/2024 0733 by Hazel Galaviz, RN  Outcome: Progressing     Problem: Infection - Adult  Goal: Absence of infection during hospitalization  Outcome: Progressing

## 2024-08-24 NOTE — PROGRESS NOTES
Initial assessment complete. Pt c/o HA and left breast pain. Will medicate per MAR. Pt denies n/v. IVABX running at this time. Pt denies needs.

## 2024-08-24 NOTE — PROGRESS NOTES
Bedside report done with Hazel. Pt is A/O x4. Pt c/o HA and left breast pain. Pt denies needs at this time. Bed alarm on, wheels locked, bed in lowest position, side rails up 2/4, nonskid socks on, call light and bedside table in reach.

## 2024-08-24 NOTE — CONSULTS
[facility] -  Clinical Pharmacy Note    Vancomycin - Management by Pharmacy    Consult Date(s): 8/22/24  Consulting Provider(s): Polo Clinton,     Assessment / Plan  Cellulitis of L Breast s/p breast reconstruction - Vancomycin  Concurrent Antimicrobials: cefepime  Day of Vanc Therapy / Ordered Duration: 3 of 7  Current Dosing Method: Bayesian-Guided AUC Dosing  Therapeutic Goal: -600 mg/L*hr  Current Dose / Plan:   Patient SCr = 0.6, stable and at baseline.  Dose increased yesterday to 1250 mg q12h.  Random level = 17.1 mcg/mL (drawn ~7h after prior dose)  Estimated AUCss ~ 511 mg/L*hr and troughss ~ 12.3 mcg/mL   Will continue current regimen.  Repeat levels will be ordered when appropriate.  Will continue to monitor clinical condition and make adjustments to regimen as appropriate.  ADDENDUM:  Pt reported itching after infusion started. 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 Dr Burton is OK with continuing vancomycin but at 1/2 of normal rate.   1250 mg dose in 250 ml should run over 3 hours.    Please call with any questions.  Mary Caruso, PharmD, BCPS  Main pharmacy: [id number]  8/24/2024 8:29 AM      Interval update:  Remains afebrile. WBC now WNL. Breast US significant for fluid collections, possible aspiration today.    Subjective/Objective:   Kaylin Sanchez is a 44 y.o. female with a PMHx significant for migraine, TMJ, depression, ADHD, Breast CA s/p mastectomy 4/25/24 with  DTI reconstruction 6/28/24,  Admitted 8/22 with cellulitis/ possible periprosthetic infection.      Ht Readings from Last 1 Encounters:   08/22/24 1.6 m (5' 2.99\")     Wt Readings from Last 1 Encounters:   08/22/24 67.4 kg (148 lb 9.6 oz)     Current & Prior Antimicrobial Regimen(s):  Cefepime (8/22 - current)  Vancomycin, pharmacy to dose  1750 mg x 1 (8/22)  1250 mg IV q12h (8/23--current)    Vancomycin Level(s) / Doses:    Date Time Dose Type of Level / Level Interpretation   8/24 ~0600 1250

## 2024-08-24 NOTE — PROGRESS NOTES
Patient is alert and orient X$, VSS, patient is ambulating at SONAL patient is voiding per BRP, patient is tolerating a reg adult diet, patient does have LR running at 50ml hr, this RN did contact the MD with concerns about medications during the shift, patient is wearing non skid socks and has call light and tray table in reach.

## 2024-08-24 NOTE — PROGRESS NOTES
Plastic Surgery   Daily Progress Note  Patient: Kaylin Sanchez      CC: breast discomfort and redness     SUBJECTIVE:   Patient rested well overnight. Reports pain.    ROS:   A 14 point review of systems was conducted, significant findings as noted above. All other systems negative.    OBJECTIVE:    PHYSICAL EXAM:    Vitals:    08/23/24 2240 08/23/24 2243 08/24/24 0359 08/24/24 0549   BP: 120/74  96/65    Pulse: 90  78    Resp: 16 16 16 16   Temp: 98.5 °F (36.9 °C)  98.6 °F (37 °C)    TempSrc: Oral  Oral    SpO2: 93%  94%    Weight:       Height:           General appearance: alert, uncomfortable  Eyes: No scleral icterus, EOM grossly intact  Neck: trachea midline,neck supple  Chest/Lungs: normal effort with no accessory muscle use on RA, left breast with erythema with induration, tender to palpation. No drainage or breakdown of incision noted.  Cardiovascular: tachycardic  Extremities: no edema, no cyanosis  Neuro: A&Ox3, no focal deficits, sensation intact    ASSESSMENT & PLAN:   Kaylin Sanchez is a 44 y.o. female with Hx of prior bilateral mastectomy with DTI reconstruction on 6/38/24. She was directly admitted 8/22 from clinic due to fever, left breast discomfort concerning for breast cellulitis/possible periprosthetic infection.      - Breast US significant for fluid collection will await final read  - Will discuss with IR if able to aspirate fluid today  - Continue Pain control  - Fluid resuscitation, monitor vitals and urine output  - Continue IV Abx: Vanc/Cefepime        Neida Elmore DO, MSMEd  PGY-3, General Surgery  08/24/24  11:33 AM  [place]    I saw and independently examined the patient today. I agree with the history of present illness, past medical/surgical histories, family history, social history, medication list and allergies as listed. The review of systems is as noted above. My physical exam confirms the findings listed above. Review of labs, pathology reports, radiology reports and medical

## 2024-08-25 LAB
ALBUMIN SERPL-MCNC: 3.4 G/DL (ref 3.4–5)
ANION GAP SERPL CALCULATED.3IONS-SCNC: 11 MMOL/L (ref 3–16)
BASOPHILS # BLD: 0.1 K/UL (ref 0–0.2)
BASOPHILS NFR BLD: 0.8 %
BUN SERPL-MCNC: 9 MG/DL (ref 7–20)
CALCIUM SERPL-MCNC: 8.8 MG/DL (ref 8.3–10.6)
CHLORIDE SERPL-SCNC: 102 MMOL/L (ref 99–110)
CO2 SERPL-SCNC: 25 MMOL/L (ref 21–32)
CREAT SERPL-MCNC: 0.5 MG/DL (ref 0.6–1.1)
DEPRECATED RDW RBC AUTO: 14.3 % (ref 12.4–15.4)
EOSINOPHIL # BLD: 0.1 K/UL (ref 0–0.6)
EOSINOPHIL NFR BLD: 0.6 %
GFR SERPLBLD CREATININE-BSD FMLA CKD-EPI: >90 ML/MIN/{1.73_M2}
GLUCOSE SERPL-MCNC: 94 MG/DL (ref 70–99)
HCT VFR BLD AUTO: 36.6 % (ref 36–48)
HGB BLD-MCNC: 12.3 G/DL (ref 12–16)
LYMPHOCYTES # BLD: 1.9 K/UL (ref 1–5.1)
LYMPHOCYTES NFR BLD: 22.5 %
MAGNESIUM SERPL-MCNC: 2 MG/DL (ref 1.8–2.4)
MCH RBC QN AUTO: 31.2 PG (ref 26–34)
MCHC RBC AUTO-ENTMCNC: 33.6 G/DL (ref 31–36)
MCV RBC AUTO: 93 FL (ref 80–100)
MONOCYTES # BLD: 0.6 K/UL (ref 0–1.3)
MONOCYTES NFR BLD: 6.9 %
NEUTROPHILS # BLD: 5.7 K/UL (ref 1.7–7.7)
NEUTROPHILS NFR BLD: 69.2 %
PHOSPHATE SERPL-MCNC: 4.2 MG/DL (ref 2.5–4.9)
PLATELET # BLD AUTO: 181 K/UL (ref 135–450)
PMV BLD AUTO: 10.1 FL (ref 5–10.5)
POTASSIUM SERPL-SCNC: 3.7 MMOL/L (ref 3.5–5.1)
RBC # BLD AUTO: 3.94 M/UL (ref 4–5.2)
SODIUM SERPL-SCNC: 138 MMOL/L (ref 136–145)
WBC # BLD AUTO: 8.2 K/UL (ref 4–11)

## 2024-08-25 PROCEDURE — 6360000002 HC RX W HCPCS

## 2024-08-25 PROCEDURE — 2580000003 HC RX 258

## 2024-08-25 PROCEDURE — 83735 ASSAY OF MAGNESIUM: CPT

## 2024-08-25 PROCEDURE — 1200000000 HC SEMI PRIVATE

## 2024-08-25 PROCEDURE — 36415 COLL VENOUS BLD VENIPUNCTURE: CPT

## 2024-08-25 PROCEDURE — 80069 RENAL FUNCTION PANEL: CPT

## 2024-08-25 PROCEDURE — 6370000000 HC RX 637 (ALT 250 FOR IP)

## 2024-08-25 PROCEDURE — 6360000002 HC RX W HCPCS: Performed by: INTERNAL MEDICINE

## 2024-08-25 PROCEDURE — 6370000000 HC RX 637 (ALT 250 FOR IP): Performed by: STUDENT IN AN ORGANIZED HEALTH CARE EDUCATION/TRAINING PROGRAM

## 2024-08-25 PROCEDURE — 2580000003 HC RX 258: Performed by: INTERNAL MEDICINE

## 2024-08-25 PROCEDURE — 85025 COMPLETE CBC W/AUTO DIFF WBC: CPT

## 2024-08-25 RX ADMIN — SODIUM CHLORIDE 1250 MG: 9 INJECTION, SOLUTION INTRAVENOUS at 10:52

## 2024-08-25 RX ADMIN — SODIUM CHLORIDE, PRESERVATIVE FREE 10 ML: 5 INJECTION INTRAVENOUS at 21:46

## 2024-08-25 RX ADMIN — CEFEPIME 2000 MG: 2 INJECTION, POWDER, FOR SOLUTION INTRAVENOUS at 16:47

## 2024-08-25 RX ADMIN — OXYCODONE HYDROCHLORIDE 10 MG: 5 TABLET ORAL at 10:39

## 2024-08-25 RX ADMIN — OXYCODONE HYDROCHLORIDE 10 MG: 5 TABLET ORAL at 16:43

## 2024-08-25 RX ADMIN — SODIUM CHLORIDE, PRESERVATIVE FREE 10 ML: 5 INJECTION INTRAVENOUS at 08:35

## 2024-08-25 RX ADMIN — ACETAMINOPHEN 1000 MG: 500 TABLET ORAL at 12:35

## 2024-08-25 RX ADMIN — OXYCODONE HYDROCHLORIDE 10 MG: 5 TABLET ORAL at 02:07

## 2024-08-25 RX ADMIN — ACETAMINOPHEN 1000 MG: 500 TABLET ORAL at 00:11

## 2024-08-25 RX ADMIN — OXYCODONE HYDROCHLORIDE 10 MG: 5 TABLET ORAL at 21:11

## 2024-08-25 RX ADMIN — CEFEPIME 2000 MG: 2 INJECTION, POWDER, FOR SOLUTION INTRAVENOUS at 08:33

## 2024-08-25 RX ADMIN — DIPHENHYDRAMINE HYDROCHLORIDE 25 MG: 25 TABLET ORAL at 22:16

## 2024-08-25 RX ADMIN — ACETAMINOPHEN 1000 MG: 500 TABLET ORAL at 06:36

## 2024-08-25 RX ADMIN — ACETAMINOPHEN 1000 MG: 500 TABLET ORAL at 18:21

## 2024-08-25 RX ADMIN — DIPHENHYDRAMINE HYDROCHLORIDE 25 MG: 25 TABLET ORAL at 08:28

## 2024-08-25 RX ADMIN — ENOXAPARIN SODIUM 40 MG: 100 INJECTION SUBCUTANEOUS at 18:02

## 2024-08-25 RX ADMIN — CEFEPIME 2000 MG: 2 INJECTION, POWDER, FOR SOLUTION INTRAVENOUS at 02:09

## 2024-08-25 RX ADMIN — OXYCODONE HYDROCHLORIDE 10 MG: 5 TABLET ORAL at 06:36

## 2024-08-25 RX ADMIN — SODIUM CHLORIDE 1250 MG: 9 INJECTION, SOLUTION INTRAVENOUS at 22:22

## 2024-08-25 ASSESSMENT — PAIN DESCRIPTION - ONSET
ONSET: ON-GOING

## 2024-08-25 ASSESSMENT — PAIN DESCRIPTION - LOCATION
LOCATION: BREAST
LOCATION: BREAST;HEAD
LOCATION: BREAST
LOCATION: BREAST
LOCATION: BACK
LOCATION: BREAST;HEAD
LOCATION: BACK
LOCATION: BREAST;HEAD
LOCATION: BREAST;HEAD

## 2024-08-25 ASSESSMENT — PAIN DESCRIPTION - PAIN TYPE
TYPE: ACUTE PAIN

## 2024-08-25 ASSESSMENT — PAIN - FUNCTIONAL ASSESSMENT
PAIN_FUNCTIONAL_ASSESSMENT: PREVENTS OR INTERFERES SOME ACTIVE ACTIVITIES AND ADLS
PAIN_FUNCTIONAL_ASSESSMENT: ACTIVITIES ARE NOT PREVENTED
PAIN_FUNCTIONAL_ASSESSMENT: PREVENTS OR INTERFERES SOME ACTIVE ACTIVITIES AND ADLS
PAIN_FUNCTIONAL_ASSESSMENT: ACTIVITIES ARE NOT PREVENTED

## 2024-08-25 ASSESSMENT — PAIN SCALES - GENERAL
PAINLEVEL_OUTOF10: 7
PAINLEVEL_OUTOF10: 0
PAINLEVEL_OUTOF10: 7
PAINLEVEL_OUTOF10: 4
PAINLEVEL_OUTOF10: 7
PAINLEVEL_OUTOF10: 5
PAINLEVEL_OUTOF10: 6
PAINLEVEL_OUTOF10: 8
PAINLEVEL_OUTOF10: 0
PAINLEVEL_OUTOF10: 8

## 2024-08-25 ASSESSMENT — PAIN DESCRIPTION - DESCRIPTORS
DESCRIPTORS: ACHING
DESCRIPTORS: ACHING
DESCRIPTORS: ACHING;DISCOMFORT
DESCRIPTORS: ACHING;DISCOMFORT
DESCRIPTORS: ACHING
DESCRIPTORS: DISCOMFORT;ACHING;SHOOTING
DESCRIPTORS: ACHING

## 2024-08-25 ASSESSMENT — PAIN DESCRIPTION - FREQUENCY
FREQUENCY: CONTINUOUS

## 2024-08-25 ASSESSMENT — PAIN DESCRIPTION - ORIENTATION
ORIENTATION: LEFT;OTHER (COMMENT)
ORIENTATION: MID
ORIENTATION: MID
ORIENTATION: LEFT

## 2024-08-25 NOTE — PROGRESS NOTES
Plastic Surgery   Daily Progress Note  Patient: Kaylin Sanchez      CC: breast discomfort and redness     SUBJECTIVE:   NAEO. Improving pain. Patient requested Valtrex for cold sore which was given.    ROS:   A 14 point review of systems was conducted, significant findings as noted above. All other systems negative.    OBJECTIVE:    PHYSICAL EXAM:    Vitals:    08/24/24 2037 08/25/24 0000 08/25/24 0207 08/25/24 0400   BP: 130/85 120/78  122/82   Pulse: 94 94  88   Resp: 16 16 16 16   Temp: 98.5 °F (36.9 °C) 98.4 °F (36.9 °C)  98.4 °F (36.9 °C)   TempSrc: Oral Oral  Oral   SpO2: 92% 92%  92%   Weight:       Height:           General appearance: alert, uncomfortable  Eyes: No scleral icterus, EOM grossly intact  Neck: trachea midline,neck supple  Chest/Lungs: normal effort with no accessory muscle use on RA, left breast with improving erythema, tender to palpation. No drainage or breakdown of incision noted.  Cardiovascular: tachycardic  Extremities: no edema, no cyanosis  Neuro: A&Ox3, no focal deficits, sensation intact    ASSESSMENT & PLAN:   Kaylin Sanchez is a 44 y.o. female with Hx of prior bilateral mastectomy with DTI reconstruction on 6/38/24. She was directly admitted 8/22 from clinic due to fever, left breast discomfort concerning for breast cellulitis/possible periprosthetic infection.      - F/u Breast US final read  - Will discuss with IR if able to aspirate fluid today  - Continue Pain control  - Fluid resuscitation, monitor vitals and urine output  - Continue IV Abx: Vanc/Cefepime    Kayla Lewis MD  PGY1, General Surgery  08/25/24  7:27 AM  [phone]

## 2024-08-25 NOTE — PROGRESS NOTES
Patient does not have IV access at this time 3 attempts were made with ultra sound and was not successful. IV access is needed for antibiotic treatment.   No

## 2024-08-25 NOTE — PLAN OF CARE
Problem: Pain  Goal: Verbalizes/displays adequate comfort level or baseline comfort level  Outcome: Progressing  Note: Pt continues to c/o pain to the left breast and HA. Oxy and tylenol given for pain     Problem: Safety - Adult  Goal: Free from fall injury  Outcome: Progressing  Note: Pt UAL.      Problem: Infection - Adult  Goal: Absence of infection at discharge  Outcome: Progressing  Note: Pt in IVABX at this time. ID following

## 2024-08-25 NOTE — PROGRESS NOTES
[facility] -  Clinical Pharmacy Note    Vancomycin - Management by Pharmacy    Consult Date(s): 8/22/24  Consulting Provider(s): Polo Clinton,     Assessment / Plan  Cellulitis of L Breast s/p breast reconstruction - Vancomycin  Concurrent Antimicrobials: cefepime  Day of Vanc Therapy / Ordered Duration: 4 of 7  Current Dosing Method: Bayesian-Guided AUC Dosing  Therapeutic Goal: -600 mg/L*hr  Current Dose / Plan:   Patient SCr = 0.5, stable and at baseline.  Patient is on vancomycin 1250 mg q12h.  Random level 8/24 = 17.1 mcg/mL (drawn ~7h after prior dose)  Estimated AUCss ~ 511 mg/L*hr and troughss ~ 12.3 mcg/mL   Will continue current regimen.  Repeat levels will be ordered when appropriate.  Will continue to monitor clinical condition and make adjustments to regimen as appropriate.  ADDENDUM:  Pt reported itching after infusion started. 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 Dr Burton is OK with continuing vancomycin but at 1/2 of normal rate.   1250 mg dose in 250 ml should run over 3 hours.    Please call with any questions.  Mary Caruso, PharmD, BCPS  Main pharmacy: [id number]  8/25/2024 11:05 AM      Interval update:  Remains afebrile and HDS. Breast US significant for fluid collections, possible aspiration today.    Subjective/Objective:   Kaylin Sanchez is a 44 y.o. female with a PMHx significant for migraine, TMJ, depression, ADHD, Breast CA s/p mastectomy 4/25/24 with  DTI reconstruction 6/28/24,  Admitted 8/22 with cellulitis/ possible periprosthetic infection.      Ht Readings from Last 1 Encounters:   08/22/24 1.6 m (5' 2.99\")     Wt Readings from Last 1 Encounters:   08/22/24 67.4 kg (148 lb 9.6 oz)     Current & Prior Antimicrobial Regimen(s):  Cefepime (8/22 - current)  Vancomycin, pharmacy to dose  1750 mg x 1 (8/22)  1250 mg IV q12h (8/23--current)    Vancomycin Level(s) / Doses:    Date Time Dose Type of Level / Level Interpretation   8/24 ~0600 1250 mg

## 2024-08-25 NOTE — PROGRESS NOTES
Patient is alert and orient X4, VSS, patient came up from [address] [place] [address] at 5pm , patient ate dinner on the unit, patient is an achs, patient is tolerating a reg 4 carb diet, patient does have all belongings at the bedside and his spouse is at the bed side 4 eye assessment was done with Paulina, patient has all standard fall precautions in place, call light and tray table are in reach.

## 2024-08-25 NOTE — PROGRESS NOTES
Patient is alert and orient X4, VSS, patient is up at [facility], patient is to be NPO after midnight, possible drainage of abscess in the left breast tomorrow morning, patient has all standard fall precautions in place, call light and tray table are in reach.

## 2024-08-25 NOTE — PLAN OF CARE
Problem: Discharge Planning  Goal: Discharge to home or other facility with appropriate resources  8/25/2024 0802 by Hazel Galaviz, RN  Outcome: Progressing Patient has case management consulted for discharge.  Problem: Pain  Goal: Verbalizes/displays adequate comfort level or baseline comfort level  8/25/2024 0802 by Hazel Galaviz, RN  Outcome: Progressing   Pain medications are being managed by the MAR.  Problem: Safety - Adult  Goal: Free from fall injury  8/25/2024 0802 by Hazel Galaviz, RN  Outcome: Progressing   Patient is wearing non skid socks and has a fall sign posted.  Problem: Infection - Adult  Goal: Absence of infection at discharge  8/25/2024 0802 by Hazel Galaviz, RN  Outcome: Progressing   Patient infection trending down.

## 2024-08-26 ENCOUNTER — ANESTHESIA EVENT (OUTPATIENT)
Dept: OPERATING ROOM | Age: 44
DRG: 907 | End: 2024-08-26
Payer: OTHER GOVERNMENT

## 2024-08-26 ENCOUNTER — ANESTHESIA (OUTPATIENT)
Dept: OPERATING ROOM | Age: 44
DRG: 907 | End: 2024-08-26
Payer: OTHER GOVERNMENT

## 2024-08-26 VITALS
WEIGHT: 148.6 LBS | SYSTOLIC BLOOD PRESSURE: 115 MMHG | BODY MASS INDEX: 26.33 KG/M2 | HEIGHT: 63 IN | OXYGEN SATURATION: 95 % | HEART RATE: 75 BPM | TEMPERATURE: 97.9 F | RESPIRATION RATE: 16 BRPM | DIASTOLIC BLOOD PRESSURE: 74 MMHG

## 2024-08-26 PROBLEM — T36.8X5A: Status: ACTIVE | Noted: 2024-08-26

## 2024-08-26 PROBLEM — Z98.82: Status: ACTIVE | Noted: 2024-08-26

## 2024-08-26 LAB
ALBUMIN SERPL-MCNC: 3.6 G/DL (ref 3.4–5)
ANION GAP SERPL CALCULATED.3IONS-SCNC: 10 MMOL/L (ref 3–16)
BACTERIA BLD CULT ORG #2: NORMAL
BACTERIA BLD CULT: NORMAL
BASOPHILS # BLD: 0.1 K/UL (ref 0–0.2)
BASOPHILS NFR BLD: 0.9 %
BUN SERPL-MCNC: 12 MG/DL (ref 7–20)
CALCIUM SERPL-MCNC: 8.4 MG/DL (ref 8.3–10.6)
CHLORIDE SERPL-SCNC: 103 MMOL/L (ref 99–110)
CO2 SERPL-SCNC: 25 MMOL/L (ref 21–32)
CREAT SERPL-MCNC: 0.6 MG/DL (ref 0.6–1.1)
DEPRECATED RDW RBC AUTO: 14.1 % (ref 12.4–15.4)
EOSINOPHIL # BLD: 0.1 K/UL (ref 0–0.6)
EOSINOPHIL NFR BLD: 1 %
GFR SERPLBLD CREATININE-BSD FMLA CKD-EPI: >90 ML/MIN/{1.73_M2}
GLUCOSE SERPL-MCNC: 98 MG/DL (ref 70–99)
HCG SERPL QL: NEGATIVE
HCT VFR BLD AUTO: 35.3 % (ref 36–48)
HGB BLD-MCNC: 12.2 G/DL (ref 12–16)
LYMPHOCYTES # BLD: 1.7 K/UL (ref 1–5.1)
LYMPHOCYTES NFR BLD: 25.4 %
MAGNESIUM SERPL-MCNC: 2.1 MG/DL (ref 1.8–2.4)
MCH RBC QN AUTO: 31.8 PG (ref 26–34)
MCHC RBC AUTO-ENTMCNC: 34.4 G/DL (ref 31–36)
MCV RBC AUTO: 92.3 FL (ref 80–100)
MONOCYTES # BLD: 0.7 K/UL (ref 0–1.3)
MONOCYTES NFR BLD: 10.5 %
NEUTROPHILS # BLD: 4.2 K/UL (ref 1.7–7.7)
NEUTROPHILS NFR BLD: 62.2 %
PHOSPHATE SERPL-MCNC: 4.3 MG/DL (ref 2.5–4.9)
PLATELET # BLD AUTO: 208 K/UL (ref 135–450)
PMV BLD AUTO: 8.6 FL (ref 5–10.5)
POTASSIUM SERPL-SCNC: 4.1 MMOL/L (ref 3.5–5.1)
RBC # BLD AUTO: 3.83 M/UL (ref 4–5.2)
SODIUM SERPL-SCNC: 138 MMOL/L (ref 136–145)
WBC # BLD AUTO: 6.8 K/UL (ref 4–11)

## 2024-08-26 PROCEDURE — 36415 COLL VENOUS BLD VENIPUNCTURE: CPT

## 2024-08-26 PROCEDURE — 83735 ASSAY OF MAGNESIUM: CPT

## 2024-08-26 PROCEDURE — 2580000003 HC RX 258: Performed by: SURGERY

## 2024-08-26 PROCEDURE — 88300 SURGICAL PATH GROSS: CPT

## 2024-08-26 PROCEDURE — 2500000003 HC RX 250 WO HCPCS

## 2024-08-26 PROCEDURE — 6360000002 HC RX W HCPCS: Performed by: ANESTHESIOLOGY

## 2024-08-26 PROCEDURE — 87186 SC STD MICRODIL/AGAR DIL: CPT

## 2024-08-26 PROCEDURE — A4217 STERILE WATER/SALINE, 500 ML: HCPCS | Performed by: SURGERY

## 2024-08-26 PROCEDURE — 6370000000 HC RX 637 (ALT 250 FOR IP)

## 2024-08-26 PROCEDURE — 6360000002 HC RX W HCPCS: Performed by: INTERNAL MEDICINE

## 2024-08-26 PROCEDURE — 3700000000 HC ANESTHESIA ATTENDED CARE: Performed by: SURGERY

## 2024-08-26 PROCEDURE — 0HPU0JZ REMOVAL OF SYNTHETIC SUBSTITUTE FROM LEFT BREAST, OPEN APPROACH: ICD-10-PCS | Performed by: SURGERY

## 2024-08-26 PROCEDURE — 99232 SBSQ HOSP IP/OBS MODERATE 35: CPT | Performed by: SURGERY

## 2024-08-26 PROCEDURE — 7100000001 HC PACU RECOVERY - ADDTL 15 MIN: Performed by: SURGERY

## 2024-08-26 PROCEDURE — 2580000003 HC RX 258

## 2024-08-26 PROCEDURE — 19371 PERI-IMPLT CAPSLC BRST COMPL: CPT | Performed by: SURGERY

## 2024-08-26 PROCEDURE — 88305 TISSUE EXAM BY PATHOLOGIST: CPT

## 2024-08-26 PROCEDURE — 7100000000 HC PACU RECOVERY - FIRST 15 MIN: Performed by: SURGERY

## 2024-08-26 PROCEDURE — C1729 CATH, DRAINAGE: HCPCS | Performed by: SURGERY

## 2024-08-26 PROCEDURE — 85025 COMPLETE CBC W/AUTO DIFF WBC: CPT

## 2024-08-26 PROCEDURE — 87075 CULTR BACTERIA EXCEPT BLOOD: CPT

## 2024-08-26 PROCEDURE — 6370000000 HC RX 637 (ALT 250 FOR IP): Performed by: STUDENT IN AN ORGANIZED HEALTH CARE EDUCATION/TRAINING PROGRAM

## 2024-08-26 PROCEDURE — 87070 CULTURE OTHR SPECIMN AEROBIC: CPT

## 2024-08-26 PROCEDURE — 2580000003 HC RX 258: Performed by: INTERNAL MEDICINE

## 2024-08-26 PROCEDURE — 87077 CULTURE AEROBIC IDENTIFY: CPT

## 2024-08-26 PROCEDURE — 6370000000 HC RX 637 (ALT 250 FOR IP): Performed by: ANESTHESIOLOGY

## 2024-08-26 PROCEDURE — 3600000012 HC SURGERY LEVEL 2 ADDTL 15MIN: Performed by: SURGERY

## 2024-08-26 PROCEDURE — 2709999900 HC NON-CHARGEABLE SUPPLY: Performed by: SURGERY

## 2024-08-26 PROCEDURE — 99233 SBSQ HOSP IP/OBS HIGH 50: CPT | Performed by: INTERNAL MEDICINE

## 2024-08-26 PROCEDURE — 6360000002 HC RX W HCPCS

## 2024-08-26 PROCEDURE — 3600000002 HC SURGERY LEVEL 2 BASE: Performed by: SURGERY

## 2024-08-26 PROCEDURE — 87205 SMEAR GRAM STAIN: CPT

## 2024-08-26 PROCEDURE — 3700000001 HC ADD 15 MINUTES (ANESTHESIA): Performed by: SURGERY

## 2024-08-26 PROCEDURE — 84703 CHORIONIC GONADOTROPIN ASSAY: CPT

## 2024-08-26 PROCEDURE — 2720000010 HC SURG SUPPLY STERILE: Performed by: SURGERY

## 2024-08-26 PROCEDURE — 80069 RENAL FUNCTION PANEL: CPT

## 2024-08-26 RX ORDER — KETOROLAC TROMETHAMINE 30 MG/ML
30 INJECTION, SOLUTION INTRAMUSCULAR; INTRAVENOUS ONCE
Status: COMPLETED | OUTPATIENT
Start: 2024-08-26 | End: 2024-08-26

## 2024-08-26 RX ORDER — HYDROMORPHONE HYDROCHLORIDE 1 MG/ML
0.5 INJECTION, SOLUTION INTRAMUSCULAR; INTRAVENOUS; SUBCUTANEOUS EVERY 5 MIN PRN
Status: COMPLETED | OUTPATIENT
Start: 2024-08-26 | End: 2024-08-26

## 2024-08-26 RX ORDER — DIPHENHYDRAMINE HYDROCHLORIDE 50 MG/ML
12.5 INJECTION INTRAMUSCULAR; INTRAVENOUS
Status: DISCONTINUED | OUTPATIENT
Start: 2024-08-26 | End: 2024-08-26 | Stop reason: HOSPADM

## 2024-08-26 RX ORDER — MIDAZOLAM HYDROCHLORIDE 1 MG/ML
INJECTION INTRAMUSCULAR; INTRAVENOUS PRN
Status: DISCONTINUED | OUTPATIENT
Start: 2024-08-26 | End: 2024-08-26 | Stop reason: SDUPTHER

## 2024-08-26 RX ORDER — HYDROMORPHONE HYDROCHLORIDE 2 MG/ML
INJECTION, SOLUTION INTRAMUSCULAR; INTRAVENOUS; SUBCUTANEOUS PRN
Status: DISCONTINUED | OUTPATIENT
Start: 2024-08-26 | End: 2024-08-26 | Stop reason: SDUPTHER

## 2024-08-26 RX ORDER — FENTANYL CITRATE 50 UG/ML
INJECTION, SOLUTION INTRAMUSCULAR; INTRAVENOUS PRN
Status: DISCONTINUED | OUTPATIENT
Start: 2024-08-26 | End: 2024-08-26 | Stop reason: SDUPTHER

## 2024-08-26 RX ORDER — MEPERIDINE HYDROCHLORIDE 25 MG/ML
12.5 INJECTION INTRAMUSCULAR; INTRAVENOUS; SUBCUTANEOUS EVERY 5 MIN PRN
Status: DISCONTINUED | OUTPATIENT
Start: 2024-08-26 | End: 2024-08-26 | Stop reason: HOSPADM

## 2024-08-26 RX ORDER — MAGNESIUM HYDROXIDE 1200 MG/15ML
LIQUID ORAL CONTINUOUS PRN
Status: COMPLETED | OUTPATIENT
Start: 2024-08-26 | End: 2024-08-26

## 2024-08-26 RX ORDER — LIDOCAINE HYDROCHLORIDE 20 MG/ML
INJECTION, SOLUTION INTRAVENOUS PRN
Status: DISCONTINUED | OUTPATIENT
Start: 2024-08-26 | End: 2024-08-26 | Stop reason: SDUPTHER

## 2024-08-26 RX ORDER — ROCURONIUM BROMIDE 10 MG/ML
INJECTION, SOLUTION INTRAVENOUS PRN
Status: DISCONTINUED | OUTPATIENT
Start: 2024-08-26 | End: 2024-08-26 | Stop reason: SDUPTHER

## 2024-08-26 RX ORDER — VALACYCLOVIR HYDROCHLORIDE 1 G/1
1000 TABLET, FILM COATED ORAL DAILY
Status: DISCONTINUED | OUTPATIENT
Start: 2024-08-27 | End: 2024-08-26 | Stop reason: HOSPADM

## 2024-08-26 RX ORDER — OXYCODONE HYDROCHLORIDE 5 MG/1
5 TABLET ORAL EVERY 6 HOURS PRN
Qty: 12 TABLET | Refills: 0 | Status: SHIPPED | OUTPATIENT
Start: 2024-08-26 | End: 2024-09-23

## 2024-08-26 RX ORDER — DOCUSATE SODIUM 100 MG/1
100 CAPSULE, LIQUID FILLED ORAL 2 TIMES DAILY PRN
Qty: 30 CAPSULE | Refills: 0 | Status: SHIPPED | OUTPATIENT
Start: 2024-08-26

## 2024-08-26 RX ORDER — DOXYCYCLINE 100 MG/1
100 TABLET ORAL 2 TIMES DAILY
Qty: 28 TABLET | Refills: 0 | Status: SHIPPED | OUTPATIENT
Start: 2024-08-26 | End: 2024-09-09

## 2024-08-26 RX ORDER — ACETAMINOPHEN 500 MG
1000 TABLET ORAL PRN
Status: COMPLETED | OUTPATIENT
Start: 2024-08-26 | End: 2024-08-26

## 2024-08-26 RX ORDER — IPRATROPIUM BROMIDE AND ALBUTEROL SULFATE 2.5; .5 MG/3ML; MG/3ML
1 SOLUTION RESPIRATORY (INHALATION)
Status: DISCONTINUED | OUTPATIENT
Start: 2024-08-26 | End: 2024-08-26 | Stop reason: HOSPADM

## 2024-08-26 RX ORDER — NALOXONE HYDROCHLORIDE 0.4 MG/ML
INJECTION, SOLUTION INTRAMUSCULAR; INTRAVENOUS; SUBCUTANEOUS PRN
Status: DISCONTINUED | OUTPATIENT
Start: 2024-08-26 | End: 2024-08-26 | Stop reason: HOSPADM

## 2024-08-26 RX ORDER — PROPOFOL 10 MG/ML
INJECTION, EMULSION INTRAVENOUS PRN
Status: DISCONTINUED | OUTPATIENT
Start: 2024-08-26 | End: 2024-08-26 | Stop reason: SDUPTHER

## 2024-08-26 RX ORDER — SODIUM CHLORIDE 0.9 % (FLUSH) 0.9 %
5-40 SYRINGE (ML) INJECTION PRN
Status: DISCONTINUED | OUTPATIENT
Start: 2024-08-26 | End: 2024-08-26 | Stop reason: HOSPADM

## 2024-08-26 RX ORDER — LORAZEPAM 2 MG/ML
0.5 INJECTION INTRAMUSCULAR
Status: COMPLETED | OUTPATIENT
Start: 2024-08-26 | End: 2024-08-26

## 2024-08-26 RX ORDER — ONDANSETRON 2 MG/ML
4 INJECTION INTRAMUSCULAR; INTRAVENOUS
Status: DISCONTINUED | OUTPATIENT
Start: 2024-08-26 | End: 2024-08-26 | Stop reason: HOSPADM

## 2024-08-26 RX ORDER — HYDROMORPHONE HYDROCHLORIDE 1 MG/ML
0.25 INJECTION, SOLUTION INTRAMUSCULAR; INTRAVENOUS; SUBCUTANEOUS ONCE
Status: COMPLETED | OUTPATIENT
Start: 2024-08-26 | End: 2024-08-26

## 2024-08-26 RX ORDER — LABETALOL HYDROCHLORIDE 5 MG/ML
10 INJECTION, SOLUTION INTRAVENOUS
Status: DISCONTINUED | OUTPATIENT
Start: 2024-08-26 | End: 2024-08-26 | Stop reason: HOSPADM

## 2024-08-26 RX ORDER — FAMOTIDINE 10 MG/ML
INJECTION, SOLUTION INTRAVENOUS PRN
Status: DISCONTINUED | OUTPATIENT
Start: 2024-08-26 | End: 2024-08-26 | Stop reason: SDUPTHER

## 2024-08-26 RX ORDER — SODIUM CHLORIDE 0.9 % (FLUSH) 0.9 %
5-40 SYRINGE (ML) INJECTION EVERY 12 HOURS SCHEDULED
Status: DISCONTINUED | OUTPATIENT
Start: 2024-08-26 | End: 2024-08-26 | Stop reason: HOSPADM

## 2024-08-26 RX ORDER — SODIUM CHLORIDE 9 MG/ML
INJECTION, SOLUTION INTRAVENOUS PRN
Status: DISCONTINUED | OUTPATIENT
Start: 2024-08-26 | End: 2024-08-26 | Stop reason: HOSPADM

## 2024-08-26 RX ORDER — PROCHLORPERAZINE EDISYLATE 5 MG/ML
5 INJECTION INTRAMUSCULAR; INTRAVENOUS
Status: DISCONTINUED | OUTPATIENT
Start: 2024-08-26 | End: 2024-08-26 | Stop reason: HOSPADM

## 2024-08-26 RX ORDER — FENTANYL CITRATE 50 UG/ML
25 INJECTION, SOLUTION INTRAMUSCULAR; INTRAVENOUS EVERY 5 MIN PRN
Status: DISCONTINUED | OUTPATIENT
Start: 2024-08-26 | End: 2024-08-26 | Stop reason: HOSPADM

## 2024-08-26 RX ORDER — CEFAZOLIN SODIUM 1 G/3ML
INJECTION, POWDER, FOR SOLUTION INTRAMUSCULAR; INTRAVENOUS PRN
Status: DISCONTINUED | OUTPATIENT
Start: 2024-08-26 | End: 2024-08-26 | Stop reason: SDUPTHER

## 2024-08-26 RX ORDER — LEVOFLOXACIN 750 MG/1
750 TABLET, FILM COATED ORAL DAILY
Qty: 14 TABLET | Refills: 0 | Status: SHIPPED | OUTPATIENT
Start: 2024-08-26 | End: 2024-09-09

## 2024-08-26 RX ORDER — ONDANSETRON 2 MG/ML
INJECTION INTRAMUSCULAR; INTRAVENOUS PRN
Status: DISCONTINUED | OUTPATIENT
Start: 2024-08-26 | End: 2024-08-26 | Stop reason: SDUPTHER

## 2024-08-26 RX ADMIN — CEFEPIME 2000 MG: 2 INJECTION, POWDER, FOR SOLUTION INTRAVENOUS at 01:34

## 2024-08-26 RX ADMIN — ACETAMINOPHEN 1000 MG: 500 TABLET ORAL at 15:32

## 2024-08-26 RX ADMIN — HYDROMORPHONE HYDROCHLORIDE 0.5 MG: 1 INJECTION, SOLUTION INTRAMUSCULAR; INTRAVENOUS; SUBCUTANEOUS at 14:57

## 2024-08-26 RX ADMIN — OXYCODONE HYDROCHLORIDE 10 MG: 5 TABLET ORAL at 06:34

## 2024-08-26 RX ADMIN — ACETAMINOPHEN 1000 MG: 500 TABLET ORAL at 06:37

## 2024-08-26 RX ADMIN — SUGAMMADEX 200 MG: 100 INJECTION, SOLUTION INTRAVENOUS at 14:02

## 2024-08-26 RX ADMIN — DEXMEDETOMIDINE HYDROCHLORIDE 4 MCG: 100 INJECTION, SOLUTION INTRAVENOUS at 13:40

## 2024-08-26 RX ADMIN — OXYCODONE HYDROCHLORIDE 10 MG: 5 TABLET ORAL at 01:29

## 2024-08-26 RX ADMIN — ACETAMINOPHEN 1000 MG: 500 TABLET ORAL at 01:29

## 2024-08-26 RX ADMIN — PROPOFOL 200 MG: 10 INJECTION, EMULSION INTRAVENOUS at 13:28

## 2024-08-26 RX ADMIN — ONDANSETRON 4 MG: 2 INJECTION INTRAMUSCULAR; INTRAVENOUS at 13:34

## 2024-08-26 RX ADMIN — CEFEPIME 2000 MG: 2 INJECTION, POWDER, FOR SOLUTION INTRAVENOUS at 08:26

## 2024-08-26 RX ADMIN — HYDROMORPHONE HYDROCHLORIDE 0.5 MG: 2 INJECTION, SOLUTION INTRAMUSCULAR; INTRAVENOUS; SUBCUTANEOUS at 13:46

## 2024-08-26 RX ADMIN — DIPHENHYDRAMINE HYDROCHLORIDE 25 MG: 25 TABLET ORAL at 09:32

## 2024-08-26 RX ADMIN — HYDROMORPHONE HYDROCHLORIDE 0.5 MG: 1 INJECTION, SOLUTION INTRAMUSCULAR; INTRAVENOUS; SUBCUTANEOUS at 15:32

## 2024-08-26 RX ADMIN — HYDROMORPHONE HYDROCHLORIDE 0.25 MG: 1 INJECTION, SOLUTION INTRAMUSCULAR; INTRAVENOUS; SUBCUTANEOUS at 09:33

## 2024-08-26 RX ADMIN — KETOROLAC TROMETHAMINE 30 MG: 30 INJECTION INTRAMUSCULAR; INTRAVENOUS at 15:27

## 2024-08-26 RX ADMIN — OXYCODONE HYDROCHLORIDE 10 MG: 5 TABLET ORAL at 18:33

## 2024-08-26 RX ADMIN — OXYCODONE HYDROCHLORIDE 10 MG: 5 TABLET ORAL at 11:53

## 2024-08-26 RX ADMIN — MIDAZOLAM HYDROCHLORIDE 2 MG: 2 INJECTION, SOLUTION INTRAMUSCULAR; INTRAVENOUS at 13:25

## 2024-08-26 RX ADMIN — SODIUM CHLORIDE 1250 MG: 9 INJECTION, SOLUTION INTRAVENOUS at 10:51

## 2024-08-26 RX ADMIN — ROCURONIUM BROMIDE 50 MG: 10 INJECTION, SOLUTION INTRAVENOUS at 13:28

## 2024-08-26 RX ADMIN — FENTANYL CITRATE 50 MCG: 50 INJECTION, SOLUTION INTRAMUSCULAR; INTRAVENOUS at 13:25

## 2024-08-26 RX ADMIN — HYDROMORPHONE HYDROCHLORIDE 0.5 MG: 1 INJECTION, SOLUTION INTRAMUSCULAR; INTRAVENOUS; SUBCUTANEOUS at 15:50

## 2024-08-26 RX ADMIN — LORAZEPAM 0.5 MG: 2 INJECTION INTRAMUSCULAR; INTRAVENOUS at 15:44

## 2024-08-26 RX ADMIN — FAMOTIDINE 20 MG: 10 INJECTION, SOLUTION INTRAVENOUS at 13:25

## 2024-08-26 RX ADMIN — LIDOCAINE HYDROCHLORIDE 100 MG: 20 INJECTION, SOLUTION INTRAVENOUS at 13:28

## 2024-08-26 RX ADMIN — CEFAZOLIN SODIUM 2 G: 1 POWDER, FOR SOLUTION INTRAMUSCULAR; INTRAVENOUS at 13:43

## 2024-08-26 RX ADMIN — FENTANYL CITRATE 50 MCG: 50 INJECTION, SOLUTION INTRAMUSCULAR; INTRAVENOUS at 13:34

## 2024-08-26 RX ADMIN — HYDROMORPHONE HYDROCHLORIDE 0.5 MG: 1 INJECTION, SOLUTION INTRAMUSCULAR; INTRAVENOUS; SUBCUTANEOUS at 15:09

## 2024-08-26 RX ADMIN — CEFEPIME 2000 MG: 2 INJECTION, POWDER, FOR SOLUTION INTRAVENOUS at 17:04

## 2024-08-26 RX ADMIN — DEXMEDETOMIDINE HYDROCHLORIDE 4 MCG: 100 INJECTION, SOLUTION INTRAVENOUS at 13:47

## 2024-08-26 ASSESSMENT — PAIN SCALES - GENERAL
PAINLEVEL_OUTOF10: 7
PAINLEVEL_OUTOF10: 6
PAINLEVEL_OUTOF10: 7
PAINLEVEL_OUTOF10: 6
PAINLEVEL_OUTOF10: 8
PAINLEVEL_OUTOF10: 6
PAINLEVEL_OUTOF10: 5
PAINLEVEL_OUTOF10: 7
PAINLEVEL_OUTOF10: 8
PAINLEVEL_OUTOF10: 5
PAINLEVEL_OUTOF10: 0
PAINLEVEL_OUTOF10: 7
PAINLEVEL_OUTOF10: 6
PAINLEVEL_OUTOF10: 7

## 2024-08-26 ASSESSMENT — PAIN DESCRIPTION - DESCRIPTORS
DESCRIPTORS: ACHING;DISCOMFORT
DESCRIPTORS: ACHING;SHARP
DESCRIPTORS: ACHING;SHARP
DESCRIPTORS: ACHING
DESCRIPTORS: ACHING
DESCRIPTORS: DISCOMFORT
DESCRIPTORS: SHARP;ACHING
DESCRIPTORS: ACHING

## 2024-08-26 ASSESSMENT — PAIN DESCRIPTION - ONSET
ONSET: ON-GOING

## 2024-08-26 ASSESSMENT — PAIN DESCRIPTION - ORIENTATION
ORIENTATION: LEFT
ORIENTATION: LEFT;OTHER (COMMENT)
ORIENTATION: LEFT

## 2024-08-26 ASSESSMENT — PAIN - FUNCTIONAL ASSESSMENT
PAIN_FUNCTIONAL_ASSESSMENT: ACTIVITIES ARE NOT PREVENTED

## 2024-08-26 ASSESSMENT — PAIN DESCRIPTION - FREQUENCY
FREQUENCY: CONTINUOUS

## 2024-08-26 ASSESSMENT — PAIN DESCRIPTION - LOCATION
LOCATION: HEAD;BREAST
LOCATION: BREAST;HEAD
LOCATION: BREAST

## 2024-08-26 ASSESSMENT — PAIN DESCRIPTION - PAIN TYPE
TYPE: ACUTE PAIN

## 2024-08-26 ASSESSMENT — LIFESTYLE VARIABLES: SMOKING_STATUS: 0

## 2024-08-26 NOTE — PLAN OF CARE
Problem: Discharge Planning  Goal: Discharge to home or other facility with appropriate resources  Outcome: Progressing  Patient actively participates in ADL's and decision making regarding plan of care.     Problem: Pain  Goal: Verbalizes/displays adequate comfort level or baseline comfort level  8/26/2024 1359 by Abi Centeno RN  Outcome: Progressing  8/26/2024 0452 by Giovana Soliz RN  Outcome: Progressing  No new signs/symptoms of pain noted, pain controlled with medication/repositioning via MAR.      Problem: Safety - Adult  Goal: Free from fall injury  8/26/2024 1359 by Abi Centeno RN  Outcome: Progressing  8/26/2024 0452 by Giovana Soliz RN  Outcome: Progressing  Flowsheets (Taken 8/25/2024 2211)  Free From Fall Injury: Instruct family/caregiver on patient safety  No falls/injuries this shift, bed in lowest position, brakes on, bed alarm on, call light in reach, side rails up x2.     Problem: Musculoskeletal - Adult  Goal: Return mobility to safest level of function  8/26/2024 1359 by Abi Centeno RN  Outcome: Progressing  8/26/2024 0452 by Giovana Soliz RN  Outcome: Progressing  Goal: Maintain proper alignment of affected body part  8/26/2024 1359 by Abi Centeno RN  Outcome: Progressing  8/26/2024 0452 by Giovana Soliz RN  Outcome: Progressing  Goal: Return ADL status to a safe level of function  8/26/2024 1359 by Abi Centeno RN  Outcome: Progressing  8/26/2024 0452 by Giovana Soliz RN  Outcome: Progressing     Problem: Genitourinary - Adult  Goal: Absence of urinary retention  8/26/2024 1359 by Abi Centeno RN  Outcome: Progressing  8/26/2024 0452 by Giovana Soliz RN  Outcome: Progressing     Problem: Infection - Adult  Goal: Absence of infection at discharge  8/26/2024 1359 by Abi Centeno RN  Outcome: Progressing  8/26/2024 0452 by Giovana Soliz RN  Outcome: Progressing  Goal: Absence of infection during hospitalization  8/26/2024 1359 by Abi Centeno RN  Outcome:  Progressing  8/26/2024 0452 by Giovana Soliz RN  Outcome: Progressing  Goal: Absence of fever/infection during anticipated neutropenic period  8/26/2024 1359 by Abi Centeno RN  Outcome: Progressing  8/26/2024 0452 by Giovana Soliz RN  Outcome: Progressing

## 2024-08-26 NOTE — PROGRESS NOTES
Patient admitted to room 5308 from PACU. Patient is A&O x 4. VSS. Patient oriented to the room all safety measures in place. Patient given IS and SCDs at this time. Admission orders released and patient 4 eyes completed. Admission documentation completed. No other needs are noted at this time.    [x] Bed alarm on and cord plugged into wall  [x] Bed in lowest position  [x] Call light and bedside table within reach  [x] Patient educated on all safety measures  []Oxygen connected to wall (if applicable)     Nurse 1 Esignature: Electronically signed by Lian Silverman RN on 8/26/24 at 5:20 PM EDT  Nurse 2 Esignature: {Esignature:[id number]}

## 2024-08-26 NOTE — BRIEF OP NOTE
Brief Postoperative Note      Patient: Kaylin Sanchez  YOB: 1980  MRN: [id number]    Date of Procedure: 8/26/2024    Pre-Op Diagnosis Codes:      * Mastodynia [N64.4]    Post-Op Diagnosis: Same       Procedure(s):  REMOVAL LEFT BREAST IMPLANT WITH LEFT BREAST CAPSULECTOMY    Surgeon(s):  Ky Whitehead MD    Assistant:  Surgical Assistant: Misael Sandoval Viviana  Resident: Kayla Lewis MD    Anesthesia: General    Estimated Blood Loss (mL): 200     Complications: None    Specimens:   ID Type Source Tests Collected by Time Destination   1 : Left Breast Fluid Body Fluid Breast CULTURE, BODY FLUID, CULTURE WITH SMEAR, ACID FAST BACILLIUS, CULTURE, ANAEROBIC AND AEROBIC Ky Whitehead MD 8/26/2024 1343    A : LEFT BREAST IMPLANT WITH LEFT CAPSULE Tissue Breast SURGICAL PATHOLOGY Ky Whitehead MD 8/26/2024 1352        Implants:  Breast implant      Drains:   Closed/Suction Drain Inferior;Left Breast Bulb (Active)       [REMOVED] Negative Pressure Wound Therapy Breast Left;Lower (Removed)       [REMOVED] Negative Pressure Wound Therapy Breast Lower;Right (Removed)       Findings:  Infection Present At Time Of Surgery (PATOS) (choose all levels that have infection present):  - Superficial Infection (skin/subcutaneous) present as evidenced by fluid consistent with infection Left Breast implant and capsule were removed and sent for pathology,  Other Findings: L Breast fluid    Electronically signed by Kayla Lewis MD on 8/26/2024 at 2:07 PM

## 2024-08-26 NOTE — PROGRESS NOTES
Patient to pre op bay 20.     VS stable.   No pain no nausea.   IVF LR infusing.     ID band and Allergy band in place.     Procedure and Anesthesia consent signed.     @[id number] to OR for procedure.

## 2024-08-26 NOTE — ANESTHESIA PRE PROCEDURE
Department of Anesthesiology  Preprocedure Note       Name:  Kaylin Sanchez   Age:  44 y.o.  :  1980                                          MRN:  [id number]         Date:  2024      Surgeon: Surgeon(s):  Ky Whitehead MD    Procedure: Procedure(s):  REMOVAL LEFT BREAST IMPLANT WITH LEFT BREAST CAPSULECTOMY    Medications prior to admission:   Prior to Admission medications    Medication Sig Start Date End Date Taking? Authorizing Provider   amphetamine-dextroamphetamine (ADDERALL, 20MG,) 20 MG tablet Take 1.5 tablets by mouth daily for 30 days. Max Daily Amount: 30 mg 24  Tish Felix MD   valACYclovir (VALTREX) 1 g tablet TAKE TWO TABLETS BY MOUTH EVERY 12 HOURS FOR 1 DAY 24   Candelario Cabello MD   Cholecalciferol (VITAMIN D-3 PO) Take by mouth    ProviderAide MD   Cyanocobalamin (VITAMIN B-12 PO) Take by mouth    ProviderAide MD   Ergocalciferol (VITAMIN D2 PO) Take by mouth    ProviderAide MD   Omega 3-5-6-7-9 Fatty Acids (COMPLETE OMEGA PO) Take by mouth    ProviderAide MD   Coenzyme Q10 (COQ10 PO) Take by mouth    ProviderAide MD   varenicline (CHANTIX) 1 MG tablet Take 1 tablet by mouth 2 times daily 3/26/24   Jeff Higuera Jr., MD   omeprazole (PRILOSEC) 20 MG delayed release capsule Take 1 capsule by mouth daily 24   Candelario Cabello MD   hydrOXYzine pamoate (VISTARIL) 25 MG capsule TAKE 1 CAPSULE FOUR TIMES A DAY AS NEEDED FOR ANXIETY 3/24/23   Candelario Cabello MD   fluticasone (FLONASE) 50 MCG/ACT nasal spray USE 1 SPRAY NASALLY DAILY  Patient taking differently: 2 sprays by Nasal route daily as needed for Allergies USE 1 SPRAY NASALLY DAILY 3/24/23   Candelario Cabello MD   Rimegepant Sulfate (NURTEC) 75 MG TBDP Take by mouth as needed    ProviderAide MD   loratadine-pseudoephedrine (CLARITIN-D 24 HOUR)  MG per tablet Take 1 tablet by mouth daily  Patient taking differently: Take 1 tablet by mouth daily as     BREAST BIOPSY      BREAST ENHANCEMENT SURGERY Bilateral 2024    Bilateral breast reconstruction with direct to implant reconstruction with Alloderm, performed by Ky Whitehead MD at [facility] OR    BREAST SURGERY Left 2022    LEFT BREAST EXCISIONAL BIOPSY performed by Haritha Duckworth MD at [facility] OR    CERVICAL DISCECTOMY      revision with bone graft    CERVICAL SPINE SURGERY  2018    2 discs removed, cadaver put in and fused x2    ENDOMETRIAL ABLATION  2013    Novasure, D&C, Diagnostic Hysteroscopy    KNEE ARTHROSCOPY Right 2013    KNEE ARTHROSCOPY Right     KNEE SURGERY Left     KNEE SURGERY Right 2013    LAPAROSCOPY      LEEP      MASTECTOMY Bilateral 2024    BILATERAL NIPPLE SPARING MASTECTOMIES performed by Debi Main MD at [facility] OR    OTHER SURGICAL HISTORY  2013    cystoscopy, trans vaginal taping    OTHER SURGICAL HISTORY  10/16/2013    Transvaginal taping take down    SHOULDER SURGERY Right 2019    UPPER GASTROINTESTINAL ENDOSCOPY N/A 2019    EGD DILATION SAVORY performed by Jeff Velázquez MD at [facility] ENDOSCOPY    UPPER GASTROINTESTINAL ENDOSCOPY  2019    EGD BIOPSY performed by Jeff Velázquez MD at [facility] ENDOSCOPY    WISDOM TOOTH EXTRACTION         Social History:    Social History     Tobacco Use    Smoking status: Former     Current packs/day: 0.00     Average packs/day: 0.5 packs/day for 15.1 years (7.5 ttl pk-yrs)     Types: Cigarettes     Start date: 2012     Quit date: 3/1/2024     Years since quittin.4    Smokeless tobacco: Never    Tobacco comments:     pack week    Substance Use Topics    Alcohol use: Never                                Counseling given: Not Answered  Tobacco comments: pack week       Vital Signs (Current):   Vitals:    24 1213 24 1218 24 1223 24 1228   BP: 134/77 136/81 127/69 125/69   Pulse: 97 84 86 86   Resp: 18 18 18 18   Temp:       TempSrc:

## 2024-08-26 NOTE — PROGRESS NOTES
Plastic Surgery  Post-operative Note      Procedure(s) Performed: removal left breast implant with left breast capsulectomy     Subjective:   Patient's pain is controlled, denies nausea or vomiting. Tolerating diet. OOB, ambulating and voiding appropriately. Denies flatus or BM at this time.     Objective:  Anesthesia type: General    Vitals:   Vitals:    08/26/24 1445 08/26/24 1545 08/26/24 1600 08/26/24 1716   BP: 113/74 129/81 119/89 115/74   Pulse: 73 79 77 75   Resp: 15 11 13 18   Temp:   98.1 °F (36.7 °C) 97.9 °F (36.6 °C)   TempSrc:   Oral Oral   SpO2: 100% 94% 95%    Weight:       Height:           Physical Exam:  Post-op vital signs:  Stable   General appearance: alert, no acute distress, grooming appropriate  Eyes: No scleral icterus, EOM grossly intact  Neck: trachea midline, no JVD, no lymphadenopathy, neck supple  Chest/Lungs: normal effort with no accessory muscle use on RA, left breast incision c/d/i  Cardiovascular: RRR, well perfused  Skin: warm and dry, no rashes  Extremities: no edema, no cyanosis  Neuro: A&Ox3, no focal deficits, sensation intact    Assessment and Plan  This is a 44 y.o. year old female status post removal left breast implant with left breast capsulectomy  secondary to Mastodynia . (8/26) POD0.    Pain management: oxycodone PRN  Cardiovasc: hemodynamically stable, will continue to monitor  Respiratory:  IS ordered to bedside, encourage hourly IS and deep breathing, wean oxygen as tolerated  Fluids:  LR 50, Diet: General diet  Ambulation: OOB to chair, encourage ambulation  Prophylaxis: SCDs, Lovenox(held)  Antibiotics: Cefipime, Vanc  Wound: Local wound care      Kayla Lewis MD  PGY1, General Surgery  08/26/24  5:55 PM  [phone]

## 2024-08-26 NOTE — PLAN OF CARE
Problem: Pain  Goal: Verbalizes/displays adequate comfort level or baseline comfort level  Outcome: Progressing   Pt endorsing pain to head/ breast. Being treated with PRN pain medication, rest, and frequent repositioning with pillow support for comfort and pressure relief. Pt reports some relief from pain with above interventions.    Problem: Safety - Adult  Goal: Free from fall injury  Outcome: Progressing  Flowsheets (Taken 8/25/2024 2211)  Free From Fall Injury: Instruct family/caregiver on patient safety  Pt up ad julianna. All fall precautions in place. Bed locked and in lowest position. Overbed table and personal belonings within reach. Call light within reach and patient instructed to use call light for assistance. Non-skid socks on.       Problem: Musculoskeletal - Adult  Goal: Return mobility to safest level of function  Outcome: Progressing     Problem: Infection - Adult  Goal: Absence of infection at discharge  Outcome: Progressing      Order 24 hour urine;

## 2024-08-26 NOTE — ANESTHESIA POSTPROCEDURE EVALUATION
Department of Anesthesiology  Postprocedure Note    Patient: Kaylin Sanchez  MRN: [id number]  YOB: 1980  Date of evaluation: 8/26/2024    Procedure Summary       Date: 08/26/24 Room / Location: [place] [address] / [facility]    Anesthesia Start: 1322 Anesthesia Stop: 1419    Procedure: REMOVAL LEFT BREAST IMPLANT WITH LEFT BREAST CAPSULECTOMY (Left: Breast) Diagnosis:       Mastodynia      (Mastodynia [N64.4])    Surgeons: Ky Whitehead MD Responsible Provider: Gato Dennis DO    Anesthesia Type: general ASA Status: 2            Anesthesia Type: No value filed.    Baldemar Phase I: Baldemar Score: 7    Baldemar Phase II:      Anesthesia Post Evaluation    Patient location during evaluation: PACU  Patient participation: complete - patient participated  Level of consciousness: awake  Airway patency: patent  Nausea & Vomiting: no nausea and no vomiting  Cardiovascular status: blood pressure returned to baseline and hemodynamically stable  Respiratory status: acceptable  Hydration status: euvolemic  Multimodal analgesia pain management approach  Pain management: adequate    No notable events documented.

## 2024-08-26 NOTE — PROGRESS NOTES
Physician Progress Note      PATIENT:               AMMON CARLOS  [facility] #:                  [id number]  :                       1980  ADMIT DATE:       2024 3:19 PM  DISCH DATE:  RESPONDING  PROVIDER #:        Ky Whitehead MD          QUERY TEXT:    Pt admitted with left breast pain and fever, possible cellulitis vs   periprosthetic infection. Pt noted to have fever, elevated HR, and elevated   WBC. If possible, please document in the progress notes and discharge summary   if you are evaluating and /or treating any of the following:  The medical record reflects the following:  Risk Factors: 44 year old female with Hx of prior bilateral mastectomy with   DTI reconstruction, admitted for breast cellulitis/possible periprosthetic   infection  Clinical Indicators:  T 100.9 - 102.1,  - 129, WBC 15.9.    Plastic Surgery H&P- Patient reported feeling fever and chills... left breast   with significant erythema... She was directly admitted  from clinic due to   fever, left breast discomfort concerning for breast cellulitis/possible   periprosthetic infection  Treatment: Labs, vitals, LR 1,000 mL bolus, Maxipime, Vancomycin  Options provided:  -- Sepsis due to cellulitis vs periprosthetic infection, present on admission  -- Cellulitis vs periprosthetic infection without Sepsis  -- Other - I will add my own diagnosis  -- Disagree - Not applicable / Not valid  -- Disagree - Clinically unable to determine / Unknown  -- Refer to Clinical Documentation Reviewer    PROVIDER RESPONSE TEXT:    This patient has sepsis due to cellulitis vs periprosthetic infection which   was present on admission.    Query created by: Maria R Saini on 2024 9:36 AM      Electronically signed by:  Ky Whitehead MD 2024 4:01 PM

## 2024-08-26 NOTE — PROGRESS NOTES
Plastic Surgery   Daily Progress Note  Patient: Kaylin Sanchez      CC: breast discomfort and redness     SUBJECTIVE:   NAEO. Pain is stable. Tolerating diet. Still complains of medial portion of left breast being tender.    ROS:   A 14 point review of systems was conducted, significant findings as noted above. All other systems negative.    OBJECTIVE:    PHYSICAL EXAM:    Vitals:    08/25/24 1600 08/25/24 2111 08/26/24 0129 08/26/24 0603   BP: 138/89 130/87 132/77    Pulse: 86 85 95 88   Resp: 16 17 17 17   Temp: 98.9 °F (37.2 °C) 98.7 °F (37.1 °C) 98.5 °F (36.9 °C)    TempSrc: Oral Oral Oral Oral   SpO2: 96% 95% 93% 94%   Weight:       Height:           General appearance: alert, uncomfortable  Eyes: No scleral icterus, EOM grossly intact  Neck: trachea midline,neck supple  Chest/Lungs: normal effort with no accessory muscle use on RA, left breast with improving erythema and induration, tender to palpation. No drainage or breakdown of incision noted. No flatulence.   Cardiovascular: tachycardic  Extremities: no edema, no cyanosis  Neuro: A&Ox3, no focal deficits, sensation intact    ASSESSMENT & PLAN:   Kaylin Sanchez is a 44 y.o. female with Hx of prior bilateral mastectomy with DTI reconstruction on 6/38/24. She was directly admitted 8/22 from clinic due to fever, left breast discomfort concerning for breast cellulitis/possible periprosthetic infection.      - F/u Breast US final read  - Will discuss with IR if able to aspirate fluid  - Will discuss with staff further management pending US read  - Continue Pain control  - Fluid resuscitation, monitor vitals and urine output  - Continue IV Abx: Vanc/Cefepime    Kayla Lewis MD  PGY1, General Surgery  08/26/24  6:35 AM  [phone]     I saw and independently examined the patient today. I agree with the history of present illness, past medical/surgical histories, family history, social history, medication list and allergies as listed. The review of systems is as noted

## 2024-08-26 NOTE — OP NOTE
[facility]     OPERATIVE DICTATION    NAME: Kaylin Sanchez   MRN: [id number]  DATE: 8/26/2024    AGE: 44 y.o.    LOCATION: [facility]    PREOPERATIVE DIAGNOSIS:  Left breast cellulitis, mastodynia     POSTOPERATIVE DIAGNOSIS:  Same.     OPERATION PERFORMED: 1) Explantation of the left breast implant      2) Left breast capsulectomy     SURGEON:  Ky Whitehead MD    ASSISTANT: Kayla Lewis (PGY1)     ANESTHESIA:  General     ESTIMATED BLOOD LOSS:  50cc     DRAINS:  1 (15F round)     SPECIMENS: Implant and capsule with culture     OPERATIVE INDICATIONS:  This is a 44 y.o. female who underwent bilateral direct to implant reconstruction 4 months ago. She notes that she has had issues with discomfort on the left breast. She was overall doing ok however developed intermittent cellulits that improved with antibiotics. She notes that she had fevers, chills, and worsening pain and was admitted and placed on IV antibiotics. We discussed options with her as her symptoms improved save for her pain.  She desired to have her implant removed. A thorough discussion regarding the risks, benefits, alternatives, outcomes, and personnel involved was performed with the patient.  After all questions were answered to the patient's satisfaction, they agreed to proceed.    OPERATIVE PROCEDURE:  The patient was marked in the preoperative holding area and brought to the operating room. She was placed in the supine position on the operating room table and underwent general anesthesia without difficulty. The patient was prepped and draped in the usual sterile manner.  A time-out was performed confirming the patient and the procedure to be performed.  The operation commenced by incising the prior inframammary incision. The subcutaneous tissues were dissected down to the capsule, which was circumferentially dissected free from the pectoralis major muscle and the mastectomy skin flap. There was excellent

## 2024-08-26 NOTE — DISCHARGE INSTRUCTIONS
[facility]    Job Whitehead MD  [address] [facility] ([address])  [place] [address] [phone]    Post-op Instructions  ___________________________________________    Direct to Implant Breast Reconstruction    The following instructions will help you know what to expect in the days following your surgery. Do not, however, hesitate to call if you have questions or concerns.    Activities    - Sleep in a flexed position with your head and shoulders elevated. Keep pillows under your knees for the first few days. You can resume your normal sleeping position when comfortable.   - Driving is prohibited for 1 to 2 weeks. Do not drive while taking pain medications.   - Avoid heavy lifting (no more than 5 pounds) and vigorous use of your arms for the first 3 weeks.   - Do not engage in sports, aerobics, or vacuuming.   - Start arm raising exercises gently within 1 week of surgery. This will be discussed at your follow-up appointment.         - Avoid heavy lifting >5 lb, bending, pushing, pulling, or straining   - Smoking (or ANY nicotine containing product) is prohibited for a minimum of 6 weeks as it interferes with healing and can lead to significant - and avoidable - complications  - You may need to use fiberfill or other padding on the opposite breast to maintain symmetry in clothing. Shoulder pads sometimes work nicely too.     Diet  - Resume your preoperative diet as tolerated.     Pain Control/Medications  - You will receive a prescription for pain medication that can be taken as directed if needed for pain control. The pain medication may cause constipation and does impair your ability to drive or make important decisions.          - You may also be given a muscle relaxant that can help with muscle spasms. Do                  not take the pain medication and muscle relaxant at the same time        - There is absolutely NO driving while on pain medication or Valium® or                at all times day and night after surgery. The bra should not be tight, have under wires, or have strong elastic. You will receive a surgical bra from the hospital stay as well as an additional clean bra to take home.  Wear these bras for the first week until your follow-up appointment.  - Foam is to remain in place at all times until your postoperative appointment.  You can remove this for showering and then reapply.     Follow-up  Call your doctor's office at the first sign of:  Excessive worsening pain associated with pressure of the breast.   Enlargement of the breast area (eggplant color or worsening bruising).   Bleeding at the incision.   Redness, drainage or odor from the incisions.   Fever or chills.   Shortness of breath.     Do not hesitate to call if you have any questions or concerns

## 2024-08-26 NOTE — PERIOP NOTE
PRE-OP NOTE  Department of Surgery      Chief Complaint or Reason for Surgery: Breast discomfort and redness    Procedure: removal left breast implant with left breast capsulectomy   Expected time: 8/26    Plan  1. Diet: NPO  2. IVF: LR 50  3. Antibiotics: Cefepime, vancomycin  4. Labs to be drawn: Type and Screen, INR  5. Anesthesia: to see patient  6. Consent: Obtained  7. Pregnancy test: ordered    Kayla Lewis MD  08/26/24  1:09 PM

## 2024-08-26 NOTE — PROGRESS NOTES
Patient A&O x 4, VSS, on RA. Pain to L breast controlled via MAR. Pt. Up to bathroom with GB/stand by assist. Pt. Going to surgery soon.

## 2024-08-26 NOTE — PROGRESS NOTES
ID Follow-up NOTE    CC:   Left breast infection, prosthesis in place   Antibiotics: Vanco, cefepime     Admit Date: 8/22/2024  Hospital Day: 5    Subjective:     Patient states continue left breast tenderness, redness, no fevers.  Plan for left breast implant removal today with left breast capsulectomy.      Objective:     Patient Vitals for the past 8 hrs:   BP Temp Temp src Pulse Resp SpO2   08/26/24 1445 113/74 -- -- 73 15 100 %   08/26/24 1430 107/74 -- -- 74 16 100 %   08/26/24 1425 108/74 -- -- 76 14 100 %   08/26/24 1420 111/77 -- -- 74 18 100 %   08/26/24 1417 114/80 97 °F (36.1 °C) Temporal 73 14 99 %   08/26/24 1228 125/69 -- -- 86 18 100 %   08/26/24 1223 127/69 -- -- 86 18 100 %   08/26/24 1218 136/81 -- -- 84 18 100 %   08/26/24 1213 134/77 -- -- 97 18 100 %   08/26/24 1130 133/85 98 °F (36.7 °C) Oral 81 16 97 %   08/26/24 1003 -- -- -- -- 16 --   08/26/24 0933 -- -- -- -- 16 --   08/26/24 0830 121/78 98.5 °F (36.9 °C) Oral 79 18 94 %   08/26/24 0704 -- -- -- -- 18 --     I/O last 3 completed shifts:  In: 1720 [P.O.:1720]  Out: -   I/O this shift:  In: 400 [I.V.:400]  Out: -     EXAM:  GENERAL: No apparent distress.    HEENT: Membranes moist, no oral lesion  NECK:  Supple, no lymphadenopathy  LUNGS: Clear b/l, no rales, no dullness  CARDIAC: RRR, no murmur appreciated  ABD:  + BS, soft / NT  EXT:  No rash, no edema, no lesions, left breast redness improving however still remains indurated and tender to palpation.  No drainage around the incision noted.  NEURO: No focal neurologic findings  PSYCH: Orientation, sensorium, mood normal  LINES:  Peripheral iv       Data Review:  Lab Results   Component Value Date    WBC 6.8 08/26/2024    HGB 12.2 08/26/2024    HCT 35.3 (L) 08/26/2024    MCV 92.3 08/26/2024     08/26/2024     Lab Results   Component Value Date    CREATININE 0.6 08/26/2024    BUN 12 08/26/2024     08/26/2024    K 4.1 08/26/2024     08/26/2024    CO2 25 08/26/2024

## 2024-08-26 NOTE — PROGRESS NOTES
[facility] -  Clinical Pharmacy Note    Vancomycin - Management by Pharmacy    Consult Date(s): 8/22/24  Consulting Provider(s): Polo Clinton,     Assessment / Plan  Cellulitis of L Breast s/p breast reconstruction - Vancomycin  Concurrent Antimicrobials: cefepime  Day of Vanc Therapy / Ordered Duration: 5 of 7  Current Dosing Method: Bayesian-Guided AUC Dosing  Therapeutic Goal: -600 mg/L*hr  Current Dose / Plan:   Renal function stable; SCr 0.6.  Currently on 1250mg IV q12h.  Calculated AUC = 513 and trough = 12.3 based on level 8/24.  Will continue current dose. May not need repeat level as therapy ordered x 2 more days (total of 7 days).   Of note, pt reported itching with doses - ID saw patient, has been tolerating doses with slower rate of infusion (1/2 of normal rate).  Repeat levels will be ordered when appropriate.  Will continue to monitor clinical condition and make adjustments to regimen as appropriate.    Please call with questions--  Rod MorenoD, [facility]  Wireless: [phone]   8/26/2024 9:41 AM        Interval update:  Surgery following - discussing possible IR intervention (aspiration of fluid). US of breast pending.     Subjective/Objective:   Kaylin Sanchez is a 44 y.o. female with a PMHx significant for migraine, TMJ, depression, ADHD, Breast CA s/p mastectomy 4/25/24 with  DTI reconstruction 6/28/24,  Admitted 8/22 with cellulitis/ possible periprosthetic infection.      Ht Readings from Last 1 Encounters:   08/22/24 1.6 m (5' 2.99\")     Wt Readings from Last 1 Encounters:   08/22/24 67.4 kg (148 lb 9.6 oz)     Current & Prior Antimicrobial Regimen(s):  Cefepime (8/22 - current)  Vancomycin, pharmacy to dose  1750 mg x 1 (8/22)  1250 mg IV q12h (8/23--current)    Vancomycin Level(s) / Doses:    Date Time Dose Type of Level / Level Interpretation   8/24 0526 1250 mg IV q12h Random = 17.1 mcg/mL Drawn ~7h after prior dose  AUC = 511 mg/L*hr  Continue 1250 mg q12h           Note: Serum levels collected for AUC-based dosing may be high if collected in close proximity to the dose administered. This is not necessarily indicative of toxicity.    Cultures & Sensitivities:    Date Site Micro Susceptibility / Result   8/22 Blood x 2 NGTD            Recent Labs     08/24/24  0526 08/25/24  0513 08/26/24  0638   CREATININE 0.6 0.5* 0.6   BUN 9 9 12   WBC 8.8 8.2 6.8       Estimated Creatinine Clearance: 110 mL/min (based on SCr of 0.6 mg/dL).    Additional Lab Values / Findings of Note:    No results for input(s): \"PROCAL\" in the last 72 hours.

## 2024-08-26 NOTE — PROGRESS NOTES
PACU Transfer Note    Vitals:    08/26/24 1600   BP: 119/89   Pulse: 77   Resp: 13   Temp: 98.1 °F (36.7 °C)   SpO2: 95%       In: 400 [I.V.:400]  Out: -     Pain assessment:    Pain Level: 6 Satisfied    Report given to Receiving unit YOLANDA Felton at bedside in PACU.    8/26/2024 4:29 PM

## 2024-08-26 NOTE — PROGRESS NOTES
From OR sedated with oral airway in place, with fluffs on left breast and surgical bra in place, LIZZIE from left breast in place, VSS.    Report from CRNA and OR RN.  Report from Dr. Lewis    S/P REMOVAL LEFT BREAST IMPLANT WITH LEFT BREAST CAPSULECTOMY

## 2024-08-26 NOTE — PROGRESS NOTES
4 Eyes Skin Assessment     NAME:  Kaylin Sanchez  YOB: 1980  MEDICAL RECORD NUMBER:  [id number]    The patient is being assessed for  Admission    I agree that at least one RN has performed a thorough Head to Toe Skin Assessment on the patient. ALL assessment sites listed below have been assessed.      Areas assessed by both nurses:    Head, Face, Ears, Shoulders, Back, Chest, Arms, Elbows, Hands, Sacrum. Buttock, Coccyx, Ischium, Legs. Feet and Heels, and Under Medical Devices         Does the Patient have a Wound? Yes wound(s) were present on assessment. LDA wound assessment was Initiated and completed by RN       Galen Prevention initiated by RN: No  Wound Care Orders initiated by RN: No    Pressure Injury (Stage 3,4, Unstageable, DTI, NWPT, and Complex wounds) if present, place Wound referral order by RN under : No    New Ostomies, if present place, Ostomy referral order under : No     Nurse 1 eSignature: Electronically signed by Lian Silverman RN on 8/26/24 at 5:19 PM EDT    **SHARE this note so that the co-signing nurse can place an eSignature**    Nurse 2 eSignature: {Esignature:[id number]}

## 2024-08-26 NOTE — PROGRESS NOTES
VSS, afebrile. Alert and oriented. PRN pain medication given with relief noted. NPO after midnight per order.  No acute events overnight. All fall & safety precautions in place. Call light within reach. Continue current plan of care.

## 2024-08-27 NOTE — DISCHARGE INSTR - COC
Continuity of Care Form    Patient Name: Kaylin Sanchez   :  1980  MRN:  [id number]    Admit date:  2024  Discharge date:  ***    Code Status Order: Prior   Advance Directives:   Advance Care Flowsheet Documentation        Date/Time Healthcare Directive Type of Healthcare Directive Copy in Chart Healthcare Agent Appointed Healthcare Agent's Name Healthcare Agent's Phone Number    24 1247 No, patient does not have an advance directive for healthcare treatment  --  --  --  --  --                     Admitting Physician:  Ky Whitehead MD  PCP: Candelario Cabello MD    Discharging Nurse: ***  Discharging Hospital Unit/Room#: 5308/5308-01  Discharging Unit Phone Number: ***    Emergency Contact:   Extended Emergency Contact Information  Primary Emergency Contact: SanchezNatalie  Address: [address] [place] [address]            [address]  Home Phone: [phone]  Mobile Phone: [phone]  Relation: Spouse    Past Surgical History:  Past Surgical History:   Procedure Laterality Date    BREAST BIOPSY      BREAST ENHANCEMENT SURGERY Bilateral 2024    Bilateral breast reconstruction with direct to implant reconstruction with Alloderm, performed by Ky Whitehead MD at [facility] OR    BREAST ENHANCEMENT SURGERY Left 2024    REMOVAL LEFT BREAST IMPLANT WITH LEFT BREAST CAPSULECTOMY performed by Ky Whitehead MD at [facility] OR    BREAST SURGERY Left 2022    LEFT BREAST EXCISIONAL BIOPSY performed by Haritha Duckworth MD at [facility] OR    CERVICAL DISCECTOMY      revision with bone graft    CERVICAL SPINE SURGERY  2018    2 discs removed, cadaver put in and fused x2    ENDOMETRIAL ABLATION  2013    Jose D&JOSR, Diagnostic Hysteroscopy    KNEE ARTHROSCOPY Right 2013    KNEE ARTHROSCOPY Right     KNEE SURGERY Left     KNEE SURGERY Right 2013    LAPAROSCOPY      LEEP      MASTECTOMY Bilateral 2024    BILATERAL NIPPLE SPARING MASTECTOMIES performed  adverse reaction, initial encounter T36.8X5A       Isolation/Infection:   Isolation            No Isolation          Patient Infection Status       None to display            Nurse Assessment:  Last Vital Signs: /74   Pulse 75   Temp 97.9 °F (36.6 °C) (Oral)   Resp 16   Ht 1.6 m (5' 2.99\")   Wt 67.4 kg (148 lb 9.6 oz)   LMP  (LMP Unknown)   SpO2 95%   BMI 26.33 kg/m²     Last documented pain score (0-10 scale): Pain Level: 8  Last Weight:   Wt Readings from Last 1 Encounters:   08/22/24 67.4 kg (148 lb 9.6 oz)     Mental Status:  {IP PT MENTAL STATUS:20030}    IV Access:  { RUEL IV ACCESS:576832646}    Nursing Mobility/ADLs:  Walking   {CHP DME ADLs:640561351}  Transfer  {CHP DME ADLs:012815825}  Bathing  {CHP DME ADLs:727015733}  Dressing  {CHP DME ADLs:986812019}  Toileting  {CHP DME ADLs:086780059}  Feeding  {CHP DME ADLs:490539576}  Med Admin  {P DME ADLs:725875693}  Med Delivery   { RUEL MED Delivery:702900790}    Wound Care Documentation and Therapy:  Incision 08/26/24 Breast Left;Lower (Active)   Dressing Status Clean;Dry;Intact 08/26/24 1600   Dressing/Treatment Surgical bra 08/26/24 1600   Drainage Amount None (dry) 08/26/24 1600   Number of days: 0        Elimination:  Continence:   Bowel: {YES / NO:19727}  Bladder: {YES / NO:19727}  Urinary Catheter: {Urinary Catheter:353866506}   Colostomy/Ileostomy/Ileal Conduit: {YES / NO:19727}       Date of Last BM: ***    Intake/Output Summary (Last 24 hours) at 8/27/2024 0921  Last data filed at 8/26/2024 1714  Gross per 24 hour   Intake 720 ml   Output 270 ml   Net 450 ml     I/O last 3 completed shifts:  In: 500 [P.O.:500]  Out: -     Safety Concerns:     { RUEL Safety Concerns:791065171}    Impairments/Disabilities:      { RUEL Impairments/Disabilities:317656863}    Nutrition Therapy:  Current Nutrition Therapy:   { RUEL Diet List:319455819}    Routes of Feeding: {CHP DME Other Feedings:955831935}  Liquids: {Slp liquid

## 2024-08-31 LAB
BACTERIA SPEC AEROBE CULT: ABNORMAL
BACTERIA SPEC ANAEROBE CULT: ABNORMAL
GRAM STN SPEC: ABNORMAL
ORGANISM: ABNORMAL

## 2024-09-03 ENCOUNTER — OFFICE VISIT (OUTPATIENT)
Dept: SURGERY | Age: 44
End: 2024-09-03

## 2024-09-03 VITALS
HEART RATE: 92 BPM | WEIGHT: 150.4 LBS | HEIGHT: 63 IN | RESPIRATION RATE: 16 BRPM | DIASTOLIC BLOOD PRESSURE: 75 MMHG | SYSTOLIC BLOOD PRESSURE: 111 MMHG | BODY MASS INDEX: 26.65 KG/M2

## 2024-09-03 DIAGNOSIS — Z09 POSTOP CHECK: Primary | ICD-10-CM

## 2024-09-03 PROCEDURE — 99024 POSTOP FOLLOW-UP VISIT: CPT

## 2024-09-03 RX ORDER — NAPROXEN 500 MG/1
TABLET ORAL
COMMUNITY
Start: 2017-11-14

## 2024-09-03 RX ORDER — FLUCONAZOLE 150 MG/1
TABLET ORAL
COMMUNITY
Start: 2024-05-02

## 2024-09-03 NOTE — PROGRESS NOTES
[facility]    PROCEDURE: 1) Explantation of the left breast implant 2) Left breast capsulectomy  DATE: 8/26/24    Kaylin Sanchez has been recovering satisfactorily since her procedure. Pain has been well controlled without pain medications.     EXAM    /75   Pulse 92   Resp 16   Ht 1.6 m (5' 2.99\")   Wt 68.2 kg (150 lb 6.4 oz)   LMP  (LMP Unknown)   BMI 26.65 kg/m²       GEN: NAD   BREAST: Incision site healing appropriately. No hematoma/seroma. Drains serosang.     IMP: 44 y.o.female s/p explantation of the left breast implant, left breast capsulectomy   PLAN: Left drain removed. She will follow up with Dr. Whitehead in 1 month to ensure continued wound healing. She will call with any concerns in the interim.      Rhonda Kelly, APRN - CNP   [facility]  [phone]  09/03/24

## 2024-09-05 ENCOUNTER — TELEPHONE (OUTPATIENT)
Dept: INFECTIOUS DISEASES | Age: 44
End: 2024-09-05

## 2024-09-05 NOTE — TELEPHONE ENCOUNTER
Spoke with patient and advised of cx results.  Advised that the PO abx covers this and to continue until course is completed.  Patient states she is tolerating them without difficulty.

## 2024-09-17 DIAGNOSIS — F90.9 ATTENTION DEFICIT HYPERACTIVITY DISORDER (ADHD), UNSPECIFIED ADHD TYPE: ICD-10-CM

## 2024-09-18 RX ORDER — DEXTROAMPHETAMINE SACCHARATE, AMPHETAMINE ASPARTATE, DEXTROAMPHETAMINE SULFATE AND AMPHETAMINE SULFATE 5; 5; 5; 5 MG/1; MG/1; MG/1; MG/1
30 TABLET ORAL DAILY
Qty: 45 TABLET | Refills: 0 | Status: SHIPPED | OUTPATIENT
Start: 2024-09-18 | End: 2024-10-18

## 2024-10-01 ENCOUNTER — TELEMEDICINE (OUTPATIENT)
Dept: FAMILY MEDICINE CLINIC | Age: 44
End: 2024-10-01
Payer: OTHER GOVERNMENT

## 2024-10-01 DIAGNOSIS — B00.1 RECURRENT COLD SORES: ICD-10-CM

## 2024-10-01 DIAGNOSIS — F90.9 ATTENTION DEFICIT HYPERACTIVITY DISORDER (ADHD), UNSPECIFIED ADHD TYPE: Primary | ICD-10-CM

## 2024-10-01 DIAGNOSIS — F41.9 ANXIETY: ICD-10-CM

## 2024-10-01 PROCEDURE — 99214 OFFICE O/P EST MOD 30 MIN: CPT | Performed by: FAMILY MEDICINE

## 2024-10-01 RX ORDER — VARENICLINE TARTRATE 1 MG/1
1 TABLET, FILM COATED ORAL 2 TIMES DAILY
Qty: 60 TABLET | Refills: 5 | Status: SHIPPED | OUTPATIENT
Start: 2024-10-01

## 2024-10-01 RX ORDER — VALACYCLOVIR HYDROCHLORIDE 1 G/1
TABLET, FILM COATED ORAL
Qty: 4 TABLET | Refills: 5 | Status: SHIPPED | OUTPATIENT
Start: 2024-10-01

## 2024-10-01 RX ORDER — HYDROXYZINE PAMOATE 25 MG/1
CAPSULE ORAL
Qty: 60 CAPSULE | Refills: 23 | Status: SHIPPED | OUTPATIENT
Start: 2024-10-01

## 2024-10-01 NOTE — PROGRESS NOTES
Kaylin Sanchez (:  1980) is a 44 y.o. female,{New vs Established:024448635::\"Established patient\"}, here for evaluation of the following chief complaint(s):  Medication Check and ADHD         Assessment & Plan  Recurrent cold sores   {A/P Summary:6964017412}         Attention deficit hyperactivity disorder (ADHD), unspecified ADHD type   {A/P Summary:5913300912}           No follow-ups on file.       Subjective   HPI    Review of Systems       Objective   Physical Exam       {Time Documentation Optional:725861986}      An electronic signature was used to authenticate this note.    --Candelario Cabello MD

## 2024-10-02 ENCOUNTER — TELEPHONE (OUTPATIENT)
Dept: ADMINISTRATIVE | Age: 44
End: 2024-10-02

## 2024-10-02 NOTE — TELEPHONE ENCOUNTER
I called  and spoke to [employer] Ref# [id number] regarding PA for Varenicline Tartrate 1MG tablets. Medication is not a covered product with .     Please notify patient. Thank you.

## 2024-10-13 DIAGNOSIS — F17.210 CIGARETTE NICOTINE DEPENDENCE WITHOUT COMPLICATION: ICD-10-CM

## 2024-10-14 ENCOUNTER — OFFICE VISIT (OUTPATIENT)
Dept: SURGERY | Age: 44
End: 2024-10-14

## 2024-10-14 VITALS
BODY MASS INDEX: 26.05 KG/M2 | OXYGEN SATURATION: 98 % | WEIGHT: 147 LBS | HEART RATE: 88 BPM | DIASTOLIC BLOOD PRESSURE: 85 MMHG | TEMPERATURE: 98.2 F | SYSTOLIC BLOOD PRESSURE: 119 MMHG

## 2024-10-14 DIAGNOSIS — Z09 POSTOP CHECK: Primary | ICD-10-CM

## 2024-10-14 PROCEDURE — 99024 POSTOP FOLLOW-UP VISIT: CPT | Performed by: SURGERY

## 2024-10-14 RX ORDER — VARENICLINE TARTRATE 0.5 (11)-1
KIT ORAL
Qty: 53 EACH | Refills: 12 | Status: SHIPPED | OUTPATIENT
Start: 2024-10-14

## 2024-10-14 NOTE — PROGRESS NOTES
[facility]    PROCEDURE: 1) Explantation of the left breast implant                           2) Left breast capsulectomy  DATE: 8/26/24    Kaylin Sanchez has been recovering satisfactorily since her procedure. Pain has been well controlled without pain medications. She is very emotional about her breast asymmetry and her loss of her breast.  Additionally, she notes that she smoked as recently as Sunday.    EXAM    /85   Pulse 88   Temp 98.2 °F (36.8 °C)   Wt 66.7 kg (147 lb)   SpO2 98%   BMI 26.05 kg/m²      GEN: NAD   BREAST: Incision healed with satisfactory contour to the left breast  No erythema/induration    IMP: 44 y.o.female s/p explantation of L breast implant  PLAN: Discussed options with Kaylin including TE placement vs possible DTI reconstruction.  However, she needs to be nicotine free prior to any intervention. Once she has abstained, will get a nicotine test and will then submit to insurance and schedule.    R/B/A/O/P discussed in detail with the patient who agrees to proceed.      Job Whitehead MD  [facility]  [phone]  10/14/24

## 2024-11-19 ENCOUNTER — TELEPHONE (OUTPATIENT)
Dept: FAMILY MEDICINE CLINIC | Age: 44
End: 2024-11-19

## 2024-11-19 DIAGNOSIS — F90.9 ATTENTION DEFICIT HYPERACTIVITY DISORDER (ADHD), UNSPECIFIED ADHD TYPE: ICD-10-CM

## 2024-11-19 RX ORDER — VARENICLINE TARTRATE 1 MG/1
1 TABLET, FILM COATED ORAL 2 TIMES DAILY
Qty: 60 TABLET | Refills: 5 | Status: SHIPPED | OUTPATIENT
Start: 2024-11-19 | End: 2024-11-19 | Stop reason: SDUPTHER

## 2024-11-19 RX ORDER — DEXTROAMPHETAMINE SACCHARATE, AMPHETAMINE ASPARTATE, DEXTROAMPHETAMINE SULFATE AND AMPHETAMINE SULFATE 5; 5; 5; 5 MG/1; MG/1; MG/1; MG/1
30 TABLET ORAL DAILY
Qty: 45 TABLET | Refills: 0 | Status: SHIPPED | OUTPATIENT
Start: 2024-11-19 | End: 2024-12-19

## 2024-11-19 NOTE — TELEPHONE ENCOUNTER
Brandon oviedo contacted the office in regards to patients Rx for Varenicline, not covered by Insurance, no alternative listed, please advise brandon oviedo [phone]

## 2024-11-19 NOTE — TELEPHONE ENCOUNTER
Last Office Visit  -  10/01/2024  Next Office Visit  -  12/06/2024    Last Filled  -  09/18/2024  10/01/2024  Last UDS -    Contract -

## 2024-11-20 RX ORDER — VARENICLINE TARTRATE 1 MG/1
1 TABLET, FILM COATED ORAL 2 TIMES DAILY
Qty: 180 TABLET | Refills: 3 | Status: SHIPPED | OUTPATIENT
Start: 2024-11-20

## 2024-11-25 ENCOUNTER — TELEPHONE (OUTPATIENT)
Dept: ADMINISTRATIVE | Age: 44
End: 2024-11-25

## 2024-11-25 NOTE — TELEPHONE ENCOUNTER
Submitted PA for Varenicline Tartrate 1MG tablets   Via CMM Key: [id number]  STATUS: CaseId:[id number];Status:Approved;Review Type:Prior Auth;Coverage Start Date:10/22/2024;Coverage End Date:03/21/2025.    Please notify patient. Thank you.

## 2024-12-06 ENCOUNTER — OFFICE VISIT (OUTPATIENT)
Dept: PRIMARY CARE CLINIC | Age: 44
End: 2024-12-06
Payer: OTHER GOVERNMENT

## 2024-12-06 VITALS
DIASTOLIC BLOOD PRESSURE: 74 MMHG | OXYGEN SATURATION: 97 % | BODY MASS INDEX: 25.69 KG/M2 | HEART RATE: 74 BPM | SYSTOLIC BLOOD PRESSURE: 126 MMHG | HEIGHT: 63 IN | WEIGHT: 145 LBS

## 2024-12-06 DIAGNOSIS — H02.403 ACQUIRED PTOSIS OF EYELID OF BOTH EYES: Primary | ICD-10-CM

## 2024-12-06 PROCEDURE — 99214 OFFICE O/P EST MOD 30 MIN: CPT | Performed by: FAMILY MEDICINE

## 2024-12-06 NOTE — PROGRESS NOTES
[facility]  [phone]  Fax: [phone]   Pre-operative History and Physical      DIAGNOSIS:  Ptosis of bilateral eyelids    PROCEDURE:  Browplasty/blepheroplasty      History Obtained From:  patient    HISTORY OF PRESENT ILLNESS:    The patient is a 44 y.o. female with significant past medical history of decreased vision superiorly who presents for preoperative exam.       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  was good, repeat recommended yearly at that point.    HNP (herniated nucleus pulposus), cervical     Medial meniscus tear     left knee    Menorrhagia     Migraine     Paresthesia of hand, bilateral     TMJ (dislocation of temporomandibular joint)     Tobacco use     not smoking as 3/1/24     Past Surgical History:   Procedure Laterality Date    BREAST BIOPSY      BREAST ENHANCEMENT SURGERY Bilateral 4/25/2024    Bilateral breast reconstruction with direct to implant reconstruction with Alloderm, performed by Ky Whitehead MD at [facility] OR    BREAST ENHANCEMENT SURGERY Left 8/26/2024    REMOVAL LEFT BREAST IMPLANT WITH LEFT BREAST CAPSULECTOMY performed by Ky Whitehead MD at [facility] OR    BREAST SURGERY Left 07/05/2022    LEFT BREAST EXCISIONAL BIOPSY performed by Haritha Duckworth MD at [facility] OR    CERVICAL DISCECTOMY      revision with bone graft    CERVICAL SPINE SURGERY  04/27/2018    2 discs removed, cadaver put in and fused x2    ENDOMETRIAL ABLATION  04/19/2013    Jose D&C, Diagnostic Hysteroscopy    KNEE ARTHROSCOPY Right 11/19/2013    KNEE ARTHROSCOPY Right     KNEE SURGERY Left     KNEE SURGERY Right 02/26/2013    LAPAROSCOPY      LEEP      MASTECTOMY Bilateral 4/25/2024    BILATERAL NIPPLE SPARING

## 2024-12-23 DIAGNOSIS — Z09 POSTOP CHECK: ICD-10-CM

## 2024-12-29 LAB
COTININE SERPL-MCNC: <5 NG/ML
NICOTINE SERPL-MCNC: <5 NG/ML

## 2025-01-08 DIAGNOSIS — F90.9 ATTENTION DEFICIT HYPERACTIVITY DISORDER (ADHD), UNSPECIFIED ADHD TYPE: ICD-10-CM

## 2025-01-09 RX ORDER — DEXTROAMPHETAMINE SACCHARATE, AMPHETAMINE ASPARTATE, DEXTROAMPHETAMINE SULFATE AND AMPHETAMINE SULFATE 5; 5; 5; 5 MG/1; MG/1; MG/1; MG/1
30 TABLET ORAL DAILY
Qty: 45 TABLET | Refills: 0 | Status: SHIPPED | OUTPATIENT
Start: 2025-01-09 | End: 2025-02-08

## 2025-01-09 NOTE — TELEPHONE ENCOUNTER
Last Office Visit  -  12/6/24  Next Office Visit  -  2/28/25    Last Filled  -  11/19/24  Last UDS -  n/a  Contract -  n/a

## 2025-01-10 ENCOUNTER — OFFICE VISIT (OUTPATIENT)
Dept: ORTHOPEDIC SURGERY | Age: 45
End: 2025-01-10
Payer: OTHER GOVERNMENT

## 2025-01-10 VITALS — WEIGHT: 145 LBS | HEIGHT: 63 IN | BODY MASS INDEX: 25.69 KG/M2

## 2025-01-10 DIAGNOSIS — M79.644 FINGER PAIN, RIGHT: Primary | ICD-10-CM

## 2025-01-10 DIAGNOSIS — M67.441 MUCOUS CYST OF DIGIT OF RIGHT HAND: ICD-10-CM

## 2025-01-10 PROCEDURE — 99214 OFFICE O/P EST MOD 30 MIN: CPT | Performed by: STUDENT IN AN ORGANIZED HEALTH CARE EDUCATION/TRAINING PROGRAM

## 2025-01-10 NOTE — PROGRESS NOTES
Hand, Upper Extremity and Reconstructive Surgery                Mackenzie Graham MD                                           Summary of Upper Extremity Problems and Interventions     Diagnosis: Right middle finger mucous cyst    History of Present Illness     Kaylin Sanchez is a 44 y.o. right hand dominant female who presents with right middle finger mucous cyst.  Patient has had a cyst on the back of her middle finger for the last 1 year.  She has popped it with a sterile 18-gauge needle several times.  It is also self expressed several times as the surrounding skin has become very thin.  She does report some mild pain of her DIP joint of the middle finger.  Denies prior surgery or injury to the hand.  She works as an .  She recently quit smoking.  She has not noticed any changes to the nail plate on that side of the finger    Allergies     Allergies   Allergen Reactions    Maxalt [Rizatriptan Benzoate] Shortness Of Breath    Imitrex [Sumatriptan] Other (See Comments)     Felt like throat was closing      Penicillins Rash    Vancomycin Itching     Confirmed per ID: \"Red-man\" reaction.   OK to give but slow infusion (1/2 rate)       Home Medications     Current Outpatient Medications   Medication Instructions    amphetamine-dextroamphetamine (ADDERALL, 20MG,) 20 MG tablet 30 mg, Oral, DAILY    hydrOXYzine pamoate (VISTARIL) 25 MG capsule TAKE 1 CAPSULE FOUR TIMES A DAY AS NEEDED FOR ANXIETY    naproxen (NAPROSYN) 500 MG tablet Oral    omeprazole (PRILOSEC) 20 mg, Oral, DAILY    Onabotulinumtoxin A (BOTOX) 200 units injection IntraMUSCular    oxyCODONE (ROXICODONE) 5 mg, Oral, EVERY 6 HOURS PRN, Intended supply: 3 days. Take lowest dose possible to manage pain    Rimegepant Sulfate (NURTEC) 75 MG TBDP Oral, PRN    valACYclovir (VALTREX) 1 g tablet TAKE TWO TABLETS BY MOUTH EVERY 12 HOURS FOR 1 DAY    varenicline (CHANTIX) 1 mg, Oral, 2 TIMES DAILY

## 2025-01-14 ENCOUNTER — PREP FOR PROCEDURE (OUTPATIENT)
Dept: SURGERY | Age: 45
End: 2025-01-14

## 2025-01-14 ENCOUNTER — TELEPHONE (OUTPATIENT)
Dept: SURGERY | Age: 45
End: 2025-01-14

## 2025-01-14 DIAGNOSIS — Z98.890 STATUS POST BREAST RECONSTRUCTION: ICD-10-CM

## 2025-01-14 RX ORDER — SODIUM CHLORIDE 0.9 % (FLUSH) 0.9 %
5-40 SYRINGE (ML) INJECTION PRN
Status: CANCELLED | OUTPATIENT
Start: 2025-02-07

## 2025-01-14 RX ORDER — SODIUM CHLORIDE, SODIUM LACTATE, POTASSIUM CHLORIDE, CALCIUM CHLORIDE 600; 310; 30; 20 MG/100ML; MG/100ML; MG/100ML; MG/100ML
INJECTION, SOLUTION INTRAVENOUS CONTINUOUS
Status: CANCELLED | OUTPATIENT
Start: 2025-02-07

## 2025-01-14 RX ORDER — SODIUM CHLORIDE 9 MG/ML
INJECTION, SOLUTION INTRAVENOUS PRN
Status: CANCELLED | OUTPATIENT
Start: 2025-02-07

## 2025-01-14 RX ORDER — SODIUM CHLORIDE 0.9 % (FLUSH) 0.9 %
5-40 SYRINGE (ML) INJECTION EVERY 12 HOURS SCHEDULED
Status: CANCELLED | OUTPATIENT
Start: 2025-02-07

## 2025-01-14 NOTE — TELEPHONE ENCOUNTER
The patient was in the office to see Dr. Whiteheda 10-.    PLAN: Discussed options with Kaylin including TE placement vs possible DTI reconstruction.  However, she needs to be nicotine free prior to any intervention. Once she has abstained, will get a nicotine test and will then submit to insurance and schedule.     I received a surgery letter.     I faxed a [facility] [employer] Patient Referral Authorization Form and clinicals today. I will scan the information that I faxed and the fax success into Epic under the media tab.     I spoke with the patient at the home number listed to provide an insurance update.      I will leave this phone note open.

## 2025-01-17 NOTE — H&P (VIEW-ONLY)
Name: Kaylin Sanchez  MRN:  [id number]  Date: 2/6/2025      Headache, Head Pain today:  0/10      Chief Complaint: Patient was last seen in headache clinic on 1/7/2025 Nia Maxwell MD.     She had Botox on her last visit and tolerated it very well.  She feels her headache frequency and intensity has been more or less stable but have noticed a new symptom of feeling dizzy and off balance in last few weeks to months.  She has been using Nurtec 75 mg p.o. as needed as a rescue treatment with reasonable success as it works within 2 hours of taking it.  Rarely she has to take ibuprofen in addition to Nurtec for more refractory headaches.  She has a breast augmentation procedure revision coming up soon.  Number of headache days 4  Intensity :- Mild 1, Moderate 2, Severe 1  Medication side effects- None  Overall improvement -80-90% better from baseline  Social and work related improvement-she feels her headache related quality of life overall have been very stable since being on Botox and would like to continue it.  Regarding rescue medication also she feels Nurtec is a right fit for her and does not want to make any changes.  As mentioned she has noticed new symptoms of dizziness and feeling off balance in relative recent time and is concerned about it.  We will order MRI brain to look for any intracranial anomaly to explain her symptoms.  She is requesting appropriate refills and is already scheduled to get her next Botox treatment in April.                   Medications   Current Outpatient Medications   Medication Sig   • cholecalciferol (vitamin D3) Take by mouth daily.   • cyanocobalamin (vitamin B-12) Take by mouth daily.   • dextroamphetamine-amphetamine Take 1 capsule (10 mg total) by mouth 2 times a day.   • omega-3 fatty acids-fish oil Take 2 g by mouth daily.   • hydrOXYzine HCL Take 1 tablet (25 mg total) by mouth every 6 hours as needed for Itching.   • ibuprofen Take 1 tablet (800 mg total) by mouth every 8 hours  as needed for Pain.   • loratadine Take 1 tablet (10 mg total) by mouth daily as needed for Allergies.   • multivitamin Take 1 capsule by mouth daily.   • oxyCODONE-acetaminophen Take 1 tablet by mouth daily.   • Nurtec ODT Take 1 tablet (75 mg total) by mouth daily as needed.     No current facility-administered medications for this visit.          Impression:   Encounter Diagnoses   Name Primary?   • Intractable chronic migraine without aura and without status migrainosus Yes   • Dizziness        Plan:  - Continue preventive and abortive medications as explained.  - Headache  Education done again including importance of hydration, sleep hygiene and   regular exercise.  - Medications changed /refilled-Nurtec 75 mg p.o. as needed.  -MRI brain without contrast to look for any intracranial anomaly to explain her dizziness and change in symptoms.  -Will continue Botox treatment as planned.  -Follow-up: 3-4 months, for Botox or as needed.    This was a Video visit, including two-way audio and video communication, in lieu of an in-person visit. The patient provided verbal consent to participate in the telehealth visit.   I spent 12 minutes speaking with the patient, conducting an interview, performing a limited exam, and educating the patient on my assessment and plan. I also spent 13 minutes, on the same day as the encounter, preparing to see the patient (eg, review of tests), obtaining and/or reviewing separately obtained history, ordering medications, tests, or procedures, documenting clinical information in the electronic or other health record, providing care coordination , and performing non-face-to-face activities.

## 2025-01-21 PROBLEM — Z98.890 STATUS POST BREAST RECONSTRUCTION: Status: ACTIVE | Noted: 2025-01-14

## 2025-01-21 NOTE — TELEPHONE ENCOUNTER
I received a fax from [employer] [facility] dated 1-. I will scan the fax into Epic under the media tab.    APPROVED  Auth/Order # [id number]  [facility]  Date Range 1- to 7-9-2025  [place]  Requested Codes Have Been Reviewed And Approved     I spoke with the patient at the home number listed to discuss insurance and surgery scheduling. The patient was offered a surgery date of 2-, but was hoping for sooner. I will send an email to [facility] to see if we can find OR time.     I will leave this phone note open.

## 2025-01-21 NOTE — TELEPHONE ENCOUNTER
I spoke with the patient at the home number listed. The patient is now scheduled for surgery with  on 2-7-2025.     The patient is aware of H&P.    The patient is scheduled for her post op appointment 2-.     I will submit the case to [facility] today.     I will fax the Alloderm order to [employer]. I will scan the order and the fax success into Epic under the media tab.     I will email the surgery information and instructions to the patient today at [email or web address].     I will close this phone note.

## 2025-01-30 ENCOUNTER — OFFICE VISIT (OUTPATIENT)
Dept: FAMILY MEDICINE CLINIC | Age: 45
End: 2025-01-30
Payer: OTHER GOVERNMENT

## 2025-01-30 ENCOUNTER — TELEPHONE (OUTPATIENT)
Dept: CARDIOLOGY CLINIC | Age: 45
End: 2025-01-30

## 2025-01-30 VITALS
OXYGEN SATURATION: 98 % | HEART RATE: 80 BPM | WEIGHT: 152 LBS | DIASTOLIC BLOOD PRESSURE: 80 MMHG | BODY MASS INDEX: 26.93 KG/M2 | SYSTOLIC BLOOD PRESSURE: 104 MMHG

## 2025-01-30 DIAGNOSIS — Z13.1 DIABETES MELLITUS SCREENING: ICD-10-CM

## 2025-01-30 DIAGNOSIS — G89.29 CHRONIC BILATERAL LOW BACK PAIN WITH LEFT-SIDED SCIATICA: ICD-10-CM

## 2025-01-30 DIAGNOSIS — E78.00 HIGH CHOLESTEROL: ICD-10-CM

## 2025-01-30 DIAGNOSIS — M54.42 CHRONIC BILATERAL LOW BACK PAIN WITH LEFT-SIDED SCIATICA: ICD-10-CM

## 2025-01-30 DIAGNOSIS — Z01.818 PRE-OP EXAM: ICD-10-CM

## 2025-01-30 DIAGNOSIS — Z90.13 HISTORY OF MASTECTOMY, BILATERAL: ICD-10-CM

## 2025-01-30 DIAGNOSIS — R94.31 ABNORMAL EKG: ICD-10-CM

## 2025-01-30 DIAGNOSIS — N64.4 MASTODYNIA OF LEFT BREAST: Primary | ICD-10-CM

## 2025-01-30 LAB
ALBUMIN SERPL-MCNC: 4.6 G/DL (ref 3.4–5)
ALBUMIN/GLOB SERPL: 1.8 {RATIO} (ref 1.1–2.2)
ALP SERPL-CCNC: 55 U/L (ref 40–129)
ALT SERPL-CCNC: 22 U/L (ref 10–40)
ANION GAP SERPL CALCULATED.3IONS-SCNC: 10 MMOL/L (ref 3–16)
APTT BLD: 31.8 SEC (ref 22.1–36.4)
AST SERPL-CCNC: 26 U/L (ref 15–37)
BASOPHILS # BLD: 0.1 K/UL (ref 0–0.2)
BASOPHILS NFR BLD: 1.5 %
BILIRUB SERPL-MCNC: 0.3 MG/DL (ref 0–1)
BUN SERPL-MCNC: 25 MG/DL (ref 7–20)
CALCIUM SERPL-MCNC: 9.1 MG/DL (ref 8.3–10.6)
CHLORIDE SERPL-SCNC: 106 MMOL/L (ref 99–110)
CHOLEST SERPL-MCNC: 236 MG/DL (ref 0–199)
CO2 SERPL-SCNC: 25 MMOL/L (ref 21–32)
CREAT SERPL-MCNC: 0.7 MG/DL (ref 0.6–1.1)
DEPRECATED RDW RBC AUTO: 14.1 % (ref 12.4–15.4)
EOSINOPHIL # BLD: 0 K/UL (ref 0–0.6)
EOSINOPHIL NFR BLD: 0.8 %
GFR SERPLBLD CREATININE-BSD FMLA CKD-EPI: >90 ML/MIN/{1.73_M2}
GLUCOSE SERPL-MCNC: 87 MG/DL (ref 70–99)
HCT VFR BLD AUTO: 42.3 % (ref 36–48)
HDLC SERPL-MCNC: 71 MG/DL (ref 40–60)
HGB BLD-MCNC: 14 G/DL (ref 12–16)
INR PPP: 0.87 (ref 0.85–1.15)
LDLC SERPL CALC-MCNC: 154 MG/DL
LYMPHOCYTES # BLD: 1.6 K/UL (ref 1–5.1)
LYMPHOCYTES NFR BLD: 31.5 %
MCH RBC QN AUTO: 31.7 PG (ref 26–34)
MCHC RBC AUTO-ENTMCNC: 33.2 G/DL (ref 31–36)
MCV RBC AUTO: 95.3 FL (ref 80–100)
MONOCYTES # BLD: 0.4 K/UL (ref 0–1.3)
MONOCYTES NFR BLD: 8.3 %
NEUTROPHILS # BLD: 3 K/UL (ref 1.7–7.7)
NEUTROPHILS NFR BLD: 57.9 %
PLATELET # BLD AUTO: 224 K/UL (ref 135–450)
PMV BLD AUTO: 9.4 FL (ref 5–10.5)
POTASSIUM SERPL-SCNC: 4.6 MMOL/L (ref 3.5–5.1)
PROT SERPL-MCNC: 7.1 G/DL (ref 6.4–8.2)
PROTHROMBIN TIME: 12 SEC (ref 11.9–14.9)
RBC # BLD AUTO: 4.43 M/UL (ref 4–5.2)
SODIUM SERPL-SCNC: 141 MMOL/L (ref 136–145)
TRIGL SERPL-MCNC: 56 MG/DL (ref 0–150)
VLDLC SERPL CALC-MCNC: 11 MG/DL
WBC # BLD AUTO: 5.2 K/UL (ref 4–11)

## 2025-01-30 PROCEDURE — 99214 OFFICE O/P EST MOD 30 MIN: CPT | Performed by: NURSE PRACTITIONER

## 2025-01-30 PROCEDURE — 93000 ELECTROCARDIOGRAM COMPLETE: CPT | Performed by: NURSE PRACTITIONER

## 2025-01-30 ASSESSMENT — PATIENT HEALTH QUESTIONNAIRE - PHQ9
SUM OF ALL RESPONSES TO PHQ QUESTIONS 1-9: 0
SUM OF ALL RESPONSES TO PHQ9 QUESTIONS 1 & 2: 0
2. FEELING DOWN, DEPRESSED OR HOPELESS: NOT AT ALL
SUM OF ALL RESPONSES TO PHQ QUESTIONS 1-9: 0
4. FEELING TIRED OR HAVING LITTLE ENERGY: NOT AT ALL
9. THOUGHTS THAT YOU WOULD BE BETTER OFF DEAD, OR OF HURTING YOURSELF: NOT AT ALL
8. MOVING OR SPEAKING SO SLOWLY THAT OTHER PEOPLE COULD HAVE NOTICED. OR THE OPPOSITE, BEING SO FIGETY OR RESTLESS THAT YOU HAVE BEEN MOVING AROUND A LOT MORE THAN USUAL: NOT AT ALL
10. IF YOU CHECKED OFF ANY PROBLEMS, HOW DIFFICULT HAVE THESE PROBLEMS MADE IT FOR YOU TO DO YOUR WORK, TAKE CARE OF THINGS AT HOME, OR GET ALONG WITH OTHER PEOPLE: NOT DIFFICULT AT ALL
SUM OF ALL RESPONSES TO PHQ QUESTIONS 1-9: 0
5. POOR APPETITE OR OVEREATING: NOT AT ALL
6. FEELING BAD ABOUT YOURSELF - OR THAT YOU ARE A FAILURE OR HAVE LET YOURSELF OR YOUR FAMILY DOWN: NOT AT ALL
7. TROUBLE CONCENTRATING ON THINGS, SUCH AS READING THE NEWSPAPER OR WATCHING TELEVISION: NOT AT ALL
1. LITTLE INTEREST OR PLEASURE IN DOING THINGS: NOT AT ALL
3. TROUBLE FALLING OR STAYING ASLEEP: NOT AT ALL
SUM OF ALL RESPONSES TO PHQ QUESTIONS 1-9: 0

## 2025-01-30 NOTE — PROGRESS NOTES
[facility]  [phone]  Fax: [phone]   Pre-operative History and Physical      DIAGNOSIS:  history of left breast cellulitis, mastodynia, high risk for breast cancer    PROCEDURE:  Left breast reconstruction with direct to implant reconstruction with Alloderm, Possible Stage One Tissue Expander Placement With Alloderm       History Obtained From:  patient, electronic medical record    HISTORY OF PRESENT ILLNESS:    The patient is a 44 y.o. 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 I am seeing this patient for preop consultation for Dr. 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  was good, repeat recommended yearly at that point.    HNP (herniated nucleus pulposus), cervical     Medial meniscus tear     left knee    Menorrhagia     Migraine     Paresthesia of hand, bilateral     TMJ (dislocation of temporomandibular joint)     Tobacco use     not smoking as 3/1/24     Past Surgical History:   Procedure Laterality Date    BREAST BIOPSY      BREAST ENHANCEMENT SURGERY Bilateral 4/25/2024    Bilateral breast reconstruction with direct to implant reconstruction with Alloderm, performed by Ky Whitehead MD at [facility] OR    BREAST ENHANCEMENT SURGERY Left 8/26/2024    REMOVAL LEFT BREAST IMPLANT WITH LEFT BREAST CAPSULECTOMY performed by Ky Whitehead MD at [facility] OR    BREAST SURGERY Left 07/05/2022    LEFT BREAST EXCISIONAL BIOPSY performed by Haritha Duckworth MD at [facility] OR    CERVICAL DISCECTOMY      revision with bone graft    CERVICAL SPINE SURGERY  04/27/2018

## 2025-01-30 NOTE — TELEPHONE ENCOUNTER
Spoke with pt. She needs cardiac clearance for a Breast implant procedure being done on 2/7/2025 at [facility]. Are there any over book days and times we can book her in to be seen before her procedure? Please advice  Pt call back # [phone].

## 2025-01-30 NOTE — TELEPHONE ENCOUNTER
CARDIAC CLEARANCE REQUEST    What type of procedure are you having:  Breast implant   Are you taking any blood thinners:  no  Type on anesthesia:  general  When is your procedure scheduled for:  02/07  What physician is performing your procedure:  Dr. Whitehead  Phone Number:  [phone]  Fax number to send the letter:  [phone]

## 2025-01-31 ENCOUNTER — OFFICE VISIT (OUTPATIENT)
Dept: CARDIOLOGY CLINIC | Age: 45
End: 2025-01-31
Payer: OTHER GOVERNMENT

## 2025-01-31 ENCOUNTER — HOSPITAL ENCOUNTER (OUTPATIENT)
Dept: MRI IMAGING | Age: 45
Discharge: HOME | End: 2025-01-31
Payer: OTHER GOVERNMENT

## 2025-01-31 VITALS
BODY MASS INDEX: 26.75 KG/M2 | DIASTOLIC BLOOD PRESSURE: 66 MMHG | HEIGHT: 63 IN | HEART RATE: 80 BPM | SYSTOLIC BLOOD PRESSURE: 110 MMHG | OXYGEN SATURATION: 97 % | WEIGHT: 151 LBS

## 2025-01-31 DIAGNOSIS — R42 DIZZINESS: ICD-10-CM

## 2025-01-31 DIAGNOSIS — Z76.89 ESTABLISHING CARE WITH NEW DOCTOR, ENCOUNTER FOR: Primary | ICD-10-CM

## 2025-01-31 DIAGNOSIS — M54.42 CHRONIC BILATERAL LOW BACK PAIN WITH LEFT-SIDED SCIATICA: ICD-10-CM

## 2025-01-31 DIAGNOSIS — G89.29 CHRONIC BILATERAL LOW BACK PAIN WITH LEFT-SIDED SCIATICA: ICD-10-CM

## 2025-01-31 DIAGNOSIS — R00.2 PALPITATIONS: ICD-10-CM

## 2025-01-31 DIAGNOSIS — Z01.810 PREOP CARDIOVASCULAR EXAM: ICD-10-CM

## 2025-01-31 LAB
EST. AVERAGE GLUCOSE BLD GHB EST-MCNC: 105.4 MG/DL
HBA1C MFR BLD: 5.3 %

## 2025-01-31 PROCEDURE — 93000 ELECTROCARDIOGRAM COMPLETE: CPT | Performed by: INTERNAL MEDICINE

## 2025-01-31 PROCEDURE — 99204 OFFICE O/P NEW MOD 45 MIN: CPT | Performed by: INTERNAL MEDICINE

## 2025-01-31 PROCEDURE — 72148 MRI LUMBAR SPINE W/O DYE: CPT

## 2025-01-31 NOTE — PATIENT INSTRUCTIONS
~Echocardiogram here in [place]     Cardiac medications reviewed including indications and pertinent side effects. Medication list updated at this visit.   Patient verbalizes understanding of the need for treatment and education has been provided at today's visit. Additional education material will be provided in after visit summary.    Check blood pressure and heart rate at home a few times per week- keep a log with dates and times and bring to office visit   Regular exercise and following a healthy diet encouraged   Follow up with me in based on testing

## 2025-01-31 NOTE — PROGRESS NOTES
[facility]   Cardiac Consultation    Referring Provider:  OCandelario Moore MD     Chief Complaint   Patient presents with    New Patient    Cardiac Clearance    Palpitations     1/2 times a day, lasting a few seconds    Dizziness     Started couple weeks ago      Kaylin Sanchez   1980        History of Present Illness:   Kaylin Sanchez is a 44 y.o. female who is here today as a new patient for preoperative cardiac risk assessment for Left breast reconstruction with direct to implant reconstruction with Alloderm, Possible Stage One Tissue Expander Placement With Alloderm- planned on 2/7/2025 with Dr. Ky Whitehead at [facility], and abnormal EKG.  She has a past medical history of breast cancer with bilateral mastectomy.    Today she states she was seen by cardiology in the past- 2017 at [facility] due to palpitations and her daughter having an SVT ablation. She had an echocardiogram and monitor at that time. Her echocardiogram was normal. She states she underwent a double mastectomy and ended up with cellulitis. She states she has palpitations around once daily that has unchanged since 2017. She stopped taking Claritin due to thinks this was causing her palpitations to be worse. She is now feeling dizziness and light headed. She has a history of migraine headaches. Palpitations feels like a pounding in her chest that last for seconds and resolves. She has monitored her heart rhythm with symptoms which shows PVC's. Palpitations come on more while at rest. She states her dizziness- (unsteady and lack of equilibrium), is worse when she turns her head and with eye movement. She has not been as active over the last year due to her migraines. She is walking on the treadmill and walks her dog at the park. No symptoms or limitations w/ these activities. No chest pain.     Past Medical History:   has a past medical history of ADHD (attention deficit hyperactivity disorder), Allergic

## 2025-02-04 RX ORDER — HYDROCODONE BITARTRATE AND ACETAMINOPHEN 10; 300 MG/1; MG/1
1 TABLET ORAL EVERY 6 HOURS PRN
COMMUNITY

## 2025-02-04 NOTE — PROGRESS NOTES
2/4/2025 0807 AM:    PRE-OP INSTRUCTIONS FOR SURGICAL PATIENTS          Our Pre-admission Testing Nurses tried and were unable to reach you today.  Please read the attached instructions AND listen to your voicemail. PLEASE CALL [phone].    Follow all instructions provided to you from your surgeon's office, including your ARRIVAL TIME.   Arrange for someone to drive you home and be with you for the first 24 hours after discharge.     NOTE: at this time ONLY 2 ADULTS may accompany you. NO CHILDREN UNDER THE AGE OF 16.    One person encouraged to stay at hospital entire time if outpatient surgery  Enter the MAIN entrance located on [address] and report to the surgical desk on the LEFT side of the lobby. Please park in the parking garage or there is free  Parking available after 7am for your use.    Bring your insurance card & photo ID with you to register.  Bring your medication list with you with dose and frequency listed (including over the counter medications)  Contact your ordering physician/surgeon for medication instructions as soon as possible, especially if taking blood thinners, aspirin, heart, or diabetic medication.   STOP VITAMINS/SUPPLEMENTS/NON-STEROIDAL ANTI-INFLAMMATORY MEDICATIONS 7 DAYS PRIOR TO PROCEDURE.   Bariatric surgical patients need to call your surgeon if on diabetic medications (as some may need to be stopped 1-week preop)  A Pre-Surgical History and Physical MUST be completed WITHIN 30 DAYS OR LESS prior to your procedure by your Physician or an Urgent Care.  DO NOT EAT ANYTHING 8 hours prior to arrival for surgery.  You may have sips of WATER ONLY (up to 8 ounces) 4 hours prior to your arrival for surgery. Then nothing further 4 hours prior to arriving at hospital.   NOTE: ALL Gastric, Bariatric & Bowel surgery patients - you MUST follow your surgeon's instructions regarding eating/drinking as you will have very specific instructions to follow.  If you did not receive these,

## 2025-02-04 NOTE — PROGRESS NOTES
2/4/2025 1201 PM:        [facility] PRE-SURGICAL TESTING INSTRUCTIONS                      PRIOR TO PROCEDURE DATE:    1. PLEASE FOLLOW ANY INSTRUCTIONS GIVEN TO YOU PER YOUR SURGEON.      2. Arrange for someone to drive you home and be with you for the first 24 hours after discharge for your safety after your procedure for which you received sedation. Ensure it is someone we can share information with regarding your discharge.     NOTE: At this time ONLY 2 ADULTS may accompany you. NO CHILDREN UNDER AGE OF 16.    One person ENCOURAGED to stay at hospital entire time if outpatient surgery      3. You must contact your surgeon for instructions IF:  You are taking any blood thinners, aspirin, anti-inflammatory or vitamins.   Contact your ordering physician/surgeon for prescription medication instructions as soon as possible, especially if taking blood thinners, aspirin, heart, or diabetic medication.   There is a change in your physical condition such as a cold, fever, rash, cuts, sores, or any other infection, especially near your surgical site.    4. Do not drink alcohol the day before or day of your procedure.  Do not use any marijuana at least 24 hours or street drugs (heroin, cocaine) at minimum 5 days prior to your procedure.     5. A Pre-Surgical History and Physical MUST be completed WITHIN 30 DAYS OR LESS prior to your procedure.by your Physician or an Urgent Care        THE DAY OF YOUR PROCEDURE:  1.  Follow instructions for ARRIVAL TIME as DIRECTED BY YOUR SURGEON. [place] [phone]     2. Enter the MAIN entrance from [facility] and follow the signs to the free Parking Garage or  Parking (offered free of charge 7 am-5pm).      3. Enter the Main Entrance of the hospital (do not enter from the lower level of the parking garage). Upon entrance, check in with the  at the surgical information desk on your LEFT.   Bring your insurance card and

## 2025-02-04 NOTE — PROGRESS NOTES
2/4/2025 0808 AM:    LMOR W/INSTRUCTIONS AND EMAILED TO PT AND LMOR REQUESTING PT TO RETURN CALL TO [facility] PAT/TS    LABS IN [facility] 1/30/2025-CMP ROUTED TO DR CHI, EKG TRACING IN EPIC 1/31/25, ECHO IN EPIC 1/31/2025,  H&P IN EPIC NOTES 1/30/25, AND  CARDIAC VISIT NOTE IN EPIC NOTES 1/31/2025 AND CARDIAC CLERANCE IN EPIC LETTERS 1/31/2025/TS    2/4/2025 1201 PM:  PT RETURNED CALL AND TI COMPLETED/TS

## 2025-02-07 ENCOUNTER — ANESTHESIA EVENT (OUTPATIENT)
Dept: OPERATING ROOM | Age: 45
End: 2025-02-07
Payer: OTHER GOVERNMENT

## 2025-02-07 ENCOUNTER — HOSPITAL ENCOUNTER (OUTPATIENT)
Age: 45
Setting detail: OUTPATIENT SURGERY
Discharge: HOME | End: 2025-02-07
Attending: SURGERY | Admitting: SURGERY
Payer: OTHER GOVERNMENT

## 2025-02-07 ENCOUNTER — TELEPHONE (OUTPATIENT)
Dept: SURGERY | Age: 45
End: 2025-02-07

## 2025-02-07 ENCOUNTER — ANESTHESIA (OUTPATIENT)
Dept: OPERATING ROOM | Age: 45
End: 2025-02-07
Payer: OTHER GOVERNMENT

## 2025-02-07 VITALS
SYSTOLIC BLOOD PRESSURE: 129 MMHG | RESPIRATION RATE: 14 BRPM | TEMPERATURE: 97.3 F | HEART RATE: 70 BPM | HEIGHT: 63 IN | BODY MASS INDEX: 26.22 KG/M2 | DIASTOLIC BLOOD PRESSURE: 83 MMHG | OXYGEN SATURATION: 96 % | WEIGHT: 148 LBS

## 2025-02-07 DIAGNOSIS — Z91.89 AT HIGH RISK FOR BREAST CANCER: Primary | ICD-10-CM

## 2025-02-07 LAB — HCG UR QL: NEGATIVE

## 2025-02-07 PROCEDURE — 7100000010 HC PHASE II RECOVERY - FIRST 15 MIN: Performed by: SURGERY

## 2025-02-07 PROCEDURE — 6360000002 HC RX W HCPCS: Performed by: SURGERY

## 2025-02-07 PROCEDURE — 2580000003 HC RX 258: Performed by: SURGERY

## 2025-02-07 PROCEDURE — 7100000011 HC PHASE II RECOVERY - ADDTL 15 MIN: Performed by: SURGERY

## 2025-02-07 PROCEDURE — C1789 PROSTHESIS, BREAST, IMP: HCPCS | Performed by: SURGERY

## 2025-02-07 PROCEDURE — 6370000000 HC RX 637 (ALT 250 FOR IP): Performed by: SURGERY

## 2025-02-07 PROCEDURE — 7100000000 HC PACU RECOVERY - FIRST 15 MIN: Performed by: SURGERY

## 2025-02-07 PROCEDURE — 7100000001 HC PACU RECOVERY - ADDTL 15 MIN: Performed by: SURGERY

## 2025-02-07 PROCEDURE — 2720000010 HC SURG SUPPLY STERILE: Performed by: SURGERY

## 2025-02-07 PROCEDURE — 3600000004 HC SURGERY LEVEL 4 BASE: Performed by: SURGERY

## 2025-02-07 PROCEDURE — C1729 CATH, DRAINAGE: HCPCS | Performed by: SURGERY

## 2025-02-07 PROCEDURE — 2500000003 HC RX 250 WO HCPCS: Performed by: SURGERY

## 2025-02-07 PROCEDURE — 19342 INSJ/RPLCMT BRST IMPLT SEP D: CPT | Performed by: SURGERY

## 2025-02-07 PROCEDURE — 3700000001 HC ADD 15 MINUTES (ANESTHESIA): Performed by: SURGERY

## 2025-02-07 PROCEDURE — 97605 NEG PRS WND THER DME<=50SQCM: CPT | Performed by: SURGERY

## 2025-02-07 PROCEDURE — 2500000003 HC RX 250 WO HCPCS: Performed by: NURSE ANESTHETIST, CERTIFIED REGISTERED

## 2025-02-07 PROCEDURE — 15777 ACELLULAR DERM MATRIX IMPLT: CPT | Performed by: SURGERY

## 2025-02-07 PROCEDURE — 6360000002 HC RX W HCPCS: Performed by: NURSE ANESTHETIST, CERTIFIED REGISTERED

## 2025-02-07 PROCEDURE — 3700000000 HC ANESTHESIA ATTENDED CARE: Performed by: SURGERY

## 2025-02-07 PROCEDURE — 84703 CHORIONIC GONADOTROPIN ASSAY: CPT

## 2025-02-07 PROCEDURE — 2709999900 HC NON-CHARGEABLE SUPPLY: Performed by: SURGERY

## 2025-02-07 PROCEDURE — 3600000014 HC SURGERY LEVEL 4 ADDTL 15MIN: Performed by: SURGERY

## 2025-02-07 DEVICE — GRAFT HUM TISS THK2-2.8MM 164 SQ CM THCK L PERF CNTOUR ACELLULAR DERM: Type: IMPLANTABLE DEVICE | Site: BREAST | Status: FUNCTIONAL

## 2025-02-07 RX ORDER — SODIUM CHLORIDE 0.9 % (FLUSH) 0.9 %
5-40 SYRINGE (ML) INJECTION PRN
Status: DISCONTINUED | OUTPATIENT
Start: 2025-02-07 | End: 2025-02-07 | Stop reason: HOSPADM

## 2025-02-07 RX ORDER — HYDROMORPHONE HYDROCHLORIDE 1 MG/ML
0.5 INJECTION, SOLUTION INTRAMUSCULAR; INTRAVENOUS; SUBCUTANEOUS EVERY 5 MIN PRN
Status: DISCONTINUED | OUTPATIENT
Start: 2025-02-07 | End: 2025-02-07 | Stop reason: HOSPADM

## 2025-02-07 RX ORDER — HYDROMORPHONE HYDROCHLORIDE 2 MG/ML
INJECTION, SOLUTION INTRAMUSCULAR; INTRAVENOUS; SUBCUTANEOUS
Status: DISCONTINUED | OUTPATIENT
Start: 2025-02-07 | End: 2025-02-07 | Stop reason: SDUPTHER

## 2025-02-07 RX ORDER — SODIUM CHLORIDE 0.9 % (FLUSH) 0.9 %
5-40 SYRINGE (ML) INJECTION EVERY 12 HOURS SCHEDULED
Status: DISCONTINUED | OUTPATIENT
Start: 2025-02-07 | End: 2025-02-07 | Stop reason: HOSPADM

## 2025-02-07 RX ORDER — NALOXONE HYDROCHLORIDE 0.4 MG/ML
INJECTION, SOLUTION INTRAMUSCULAR; INTRAVENOUS; SUBCUTANEOUS PRN
Status: DISCONTINUED | OUTPATIENT
Start: 2025-02-07 | End: 2025-02-07 | Stop reason: HOSPADM

## 2025-02-07 RX ORDER — ONDANSETRON 2 MG/ML
INJECTION INTRAMUSCULAR; INTRAVENOUS
Status: DISCONTINUED | OUTPATIENT
Start: 2025-02-07 | End: 2025-02-07 | Stop reason: SDUPTHER

## 2025-02-07 RX ORDER — LIDOCAINE HYDROCHLORIDE 10 MG/ML
1 INJECTION, SOLUTION EPIDURAL; INFILTRATION; INTRACAUDAL; PERINEURAL
Status: DISCONTINUED | OUTPATIENT
Start: 2025-02-07 | End: 2025-02-07 | Stop reason: HOSPADM

## 2025-02-07 RX ORDER — OXYCODONE AND ACETAMINOPHEN 5; 325 MG/1; MG/1
1 TABLET ORAL EVERY 6 HOURS PRN
Qty: 28 TABLET | Refills: 0 | Status: SHIPPED | OUTPATIENT
Start: 2025-02-07 | End: 2025-02-14

## 2025-02-07 RX ORDER — MEPERIDINE HYDROCHLORIDE 25 MG/ML
12.5 INJECTION INTRAMUSCULAR; INTRAVENOUS; SUBCUTANEOUS EVERY 5 MIN PRN
Status: DISCONTINUED | OUTPATIENT
Start: 2025-02-07 | End: 2025-02-07 | Stop reason: HOSPADM

## 2025-02-07 RX ORDER — SODIUM CHLORIDE, SODIUM LACTATE, POTASSIUM CHLORIDE, CALCIUM CHLORIDE 600; 310; 30; 20 MG/100ML; MG/100ML; MG/100ML; MG/100ML
INJECTION, SOLUTION INTRAVENOUS CONTINUOUS
Status: DISCONTINUED | OUTPATIENT
Start: 2025-02-07 | End: 2025-02-07 | Stop reason: SDUPTHER

## 2025-02-07 RX ORDER — SODIUM CHLORIDE, SODIUM LACTATE, POTASSIUM CHLORIDE, CALCIUM CHLORIDE 600; 310; 30; 20 MG/100ML; MG/100ML; MG/100ML; MG/100ML
INJECTION, SOLUTION INTRAVENOUS CONTINUOUS
Status: DISCONTINUED | OUTPATIENT
Start: 2025-02-07 | End: 2025-02-07 | Stop reason: HOSPADM

## 2025-02-07 RX ORDER — DIPHENHYDRAMINE HYDROCHLORIDE 50 MG/ML
12.5 INJECTION INTRAMUSCULAR; INTRAVENOUS
Status: DISCONTINUED | OUTPATIENT
Start: 2025-02-07 | End: 2025-02-07 | Stop reason: HOSPADM

## 2025-02-07 RX ORDER — CLINDAMYCIN HYDROCHLORIDE 300 MG/1
300 CAPSULE ORAL 2 TIMES DAILY
Qty: 14 CAPSULE | Refills: 0 | Status: SHIPPED | OUTPATIENT
Start: 2025-02-07 | End: 2025-02-14

## 2025-02-07 RX ORDER — OXYCODONE HYDROCHLORIDE 5 MG/1
5 TABLET ORAL
Status: COMPLETED | OUTPATIENT
Start: 2025-02-07 | End: 2025-02-07

## 2025-02-07 RX ORDER — LABETALOL HYDROCHLORIDE 5 MG/ML
10 INJECTION, SOLUTION INTRAVENOUS
Status: DISCONTINUED | OUTPATIENT
Start: 2025-02-07 | End: 2025-02-07 | Stop reason: HOSPADM

## 2025-02-07 RX ORDER — METOCLOPRAMIDE HYDROCHLORIDE 5 MG/ML
10 INJECTION INTRAMUSCULAR; INTRAVENOUS
Status: DISCONTINUED | OUTPATIENT
Start: 2025-02-07 | End: 2025-02-07 | Stop reason: HOSPADM

## 2025-02-07 RX ORDER — SODIUM CHLORIDE 9 MG/ML
INJECTION, SOLUTION INTRAVENOUS PRN
Status: DISCONTINUED | OUTPATIENT
Start: 2025-02-07 | End: 2025-02-07 | Stop reason: HOSPADM

## 2025-02-07 RX ORDER — LIDOCAINE HYDROCHLORIDE 20 MG/ML
INJECTION, SOLUTION INTRAVENOUS
Status: DISCONTINUED | OUTPATIENT
Start: 2025-02-07 | End: 2025-02-07 | Stop reason: SDUPTHER

## 2025-02-07 RX ORDER — FAMOTIDINE 10 MG/ML
INJECTION, SOLUTION INTRAVENOUS
Status: DISCONTINUED | OUTPATIENT
Start: 2025-02-07 | End: 2025-02-07 | Stop reason: SDUPTHER

## 2025-02-07 RX ORDER — ROCURONIUM BROMIDE 10 MG/ML
INJECTION, SOLUTION INTRAVENOUS
Status: DISCONTINUED | OUTPATIENT
Start: 2025-02-07 | End: 2025-02-07 | Stop reason: SDUPTHER

## 2025-02-07 RX ORDER — PHENYLEPHRINE HYDROCHLORIDE 10 MG/ML
INJECTION INTRAVENOUS
Status: DISCONTINUED | OUTPATIENT
Start: 2025-02-07 | End: 2025-02-07 | Stop reason: SDUPTHER

## 2025-02-07 RX ORDER — PROPOFOL 10 MG/ML
INJECTION, EMULSION INTRAVENOUS
Status: DISCONTINUED | OUTPATIENT
Start: 2025-02-07 | End: 2025-02-07 | Stop reason: SDUPTHER

## 2025-02-07 RX ORDER — HYDRALAZINE HYDROCHLORIDE 20 MG/ML
10 INJECTION INTRAMUSCULAR; INTRAVENOUS
Status: DISCONTINUED | OUTPATIENT
Start: 2025-02-07 | End: 2025-02-07 | Stop reason: HOSPADM

## 2025-02-07 RX ORDER — METHOCARBAMOL 100 MG/ML
INJECTION, SOLUTION INTRAMUSCULAR; INTRAVENOUS
Status: DISCONTINUED | OUTPATIENT
Start: 2025-02-07 | End: 2025-02-07 | Stop reason: SDUPTHER

## 2025-02-07 RX ORDER — HYDROMORPHONE HYDROCHLORIDE 1 MG/ML
0.5 INJECTION, SOLUTION INTRAMUSCULAR; INTRAVENOUS; SUBCUTANEOUS EVERY 10 MIN PRN
Status: DISCONTINUED | OUTPATIENT
Start: 2025-02-07 | End: 2025-02-07 | Stop reason: HOSPADM

## 2025-02-07 RX ADMIN — METHOCARBAMOL 1000 MG: 100 INJECTION, SOLUTION INTRAMUSCULAR; INTRAVENOUS at 07:24

## 2025-02-07 RX ADMIN — PHENYLEPHRINE HYDROCHLORIDE 50 MCG: 10 INJECTION, SOLUTION INTRAVENOUS at 07:24

## 2025-02-07 RX ADMIN — HYDROMORPHONE HYDROCHLORIDE 2 MG: 2 INJECTION, SOLUTION INTRAMUSCULAR; INTRAVENOUS; SUBCUTANEOUS at 07:20

## 2025-02-07 RX ADMIN — ROCURONIUM BROMIDE 80 MG: 10 INJECTION, SOLUTION INTRAVENOUS at 07:21

## 2025-02-07 RX ADMIN — ONDANSETRON 8 MG: 2 INJECTION INTRAMUSCULAR; INTRAVENOUS at 07:16

## 2025-02-07 RX ADMIN — TRANEXAMIC ACID 1000 MG: 1 INJECTION, SOLUTION INTRAVENOUS at 07:29

## 2025-02-07 RX ADMIN — LIDOCAINE HYDROCHLORIDE 100 MG: 20 INJECTION, SOLUTION INTRAVENOUS at 07:20

## 2025-02-07 RX ADMIN — SODIUM CHLORIDE, POTASSIUM CHLORIDE, SODIUM LACTATE AND CALCIUM CHLORIDE: 600; 310; 30; 20 INJECTION, SOLUTION INTRAVENOUS at 06:36

## 2025-02-07 RX ADMIN — PROPOFOL 200 MG: 10 INJECTION, EMULSION INTRAVENOUS at 07:20

## 2025-02-07 RX ADMIN — WATER 2000 MG: 1 INJECTION INTRAMUSCULAR; INTRAVENOUS; SUBCUTANEOUS at 07:27

## 2025-02-07 RX ADMIN — FAMOTIDINE 20 MG: 10 INJECTION, SOLUTION INTRAVENOUS at 07:16

## 2025-02-07 RX ADMIN — OXYCODONE 5 MG: 5 TABLET ORAL at 09:03

## 2025-02-07 RX ADMIN — SUGAMMADEX 200 MG: 100 INJECTION, SOLUTION INTRAVENOUS at 08:16

## 2025-02-07 ASSESSMENT — PAIN SCALES - GENERAL
PAINLEVEL_OUTOF10: 3
PAINLEVEL_OUTOF10: 3

## 2025-02-07 ASSESSMENT — PAIN - FUNCTIONAL ASSESSMENT
PAIN_FUNCTIONAL_ASSESSMENT: ACTIVITIES ARE NOT PREVENTED
PAIN_FUNCTIONAL_ASSESSMENT: 0-10

## 2025-02-07 ASSESSMENT — PAIN DESCRIPTION - DESCRIPTORS
DESCRIPTORS: ACHING
DESCRIPTORS: ACHING

## 2025-02-07 ASSESSMENT — PAIN DESCRIPTION - FREQUENCY: FREQUENCY: CONTINUOUS

## 2025-02-07 ASSESSMENT — PAIN DESCRIPTION - PAIN TYPE: TYPE: ACUTE PAIN;SURGICAL PAIN

## 2025-02-07 ASSESSMENT — PAIN DESCRIPTION - ORIENTATION
ORIENTATION: LEFT
ORIENTATION: LEFT

## 2025-02-07 ASSESSMENT — PAIN DESCRIPTION - LOCATION
LOCATION: BREAST
LOCATION: BREAST

## 2025-02-07 ASSESSMENT — PAIN DESCRIPTION - ONSET: ONSET: ON-GOING

## 2025-02-07 NOTE — OP NOTE
[facility] PLASTIC & RECONSTRUCTIVE SURGERY     OPERATIVE DICTATION    NAME: Kaylin Sanchez   MRN: [id number]  DATE: 2/7/2025     AGE: 44 y.o.    SURGEON: Ky Whitehead MD  ASSISTANT: Cindy Vogel ()     PREOPERATIVE DIAGNOSIS: Acquired absence left breast, status post mastectomy   POSTOPERATIVE DIAGNOSIS: Same     OPERATION: 1) Delayed left direct to implant breast reconstruction  2) Insertion of Alloderm Acellular Dermal Matrix (328 cm^2))   3) Application of Gaby incisional wound vacuum device      ANESTHESIA: General     ESTIMATED BLOOD LOSS: <50 mL    OPERATIVE INDICATIONS: This is a 44 y.o. female who underwent bilateral direct to implant reconstruction 4 months ago. She notes that she has had issues with discomfort on the left breast. She was overall doing ok however developed intermittent cellulits that improved with antibiotics. She notes that she had fevers, chills, and worsening pain and was admitted and placed on IV antibiotics. We discussed options with her as her symptoms improved save for her pain and underwent implant removal. She notes postoperatively that she had restarted smoking and she spent several months to working on nicotine abstinence. She is brought back to the operating room today for the above procedure. The plan of surgery, risks, benefits, alternatives, indications, limitations, possible complications, and future surgeries were discussed with the patient and she agreed to proceed.     OPERATIVE PROCEDURE: The patient was marked in the standing position in the preoperative holding area.  She was then brought to the operating room and placed in the supine position on the operating table. After satisfactory induction with general endotracheal anesthesia, the patient was then prepped and draped in the usual sterile fashion.      I began by reopening the prior inframammary incision. The subcutaneous tissues were dissected to the pectoralis muscle and the mastectomy plane was

## 2025-02-07 NOTE — TELEPHONE ENCOUNTER
Rosales, a pharmacist with [facility] pharmacy, called in stating that he is unable to fill the patient's pain medication due to her already having an active prescription for pain medication    The patient informed Rosales that Dr. Whitehead told her to stop taking the other prescription pain medication and use the new prescription she was given after surgery    Rosales states that he is not able to dispense the pain medication until the clinical team calls him to discuss this situation    Please contact Rosales at [phone] or [phone]

## 2025-02-07 NOTE — ANESTHESIA PRE PROCEDURE
Department of Anesthesiology  Preprocedure Note       Name:  Kaylin Sanchez   Age:  44 y.o.  :  1980                                          MRN:  [id number]         Date:  2025      Surgeon: Surgeon(s):  Ky Whitehead MD    Procedure: Procedure(s):  Left breast reconstruction with direct to implant reconstruction with Alloderm, Possible Stage One Tissue Expander Placement With Alloderm    Medications prior to admission:   Prior to Admission medications    Medication Sig Start Date End Date Taking? Authorizing Provider   amphetamine-dextroamphetamine (ADDERALL, 20MG,) 20 MG tablet Take 1.5 tablets by mouth daily for 30 days. Max Daily Amount: 30 mg 25 Yes Alida Ascencio APRN - CNP   omeprazole (PRILOSEC) 20 MG delayed release capsule TAKE 1 CAPSULE DAILY 24  Yes Candelario Cabello MD   valACYclovir (VALTREX) 1 g tablet TAKE TWO TABLETS BY MOUTH EVERY 12 HOURS FOR 1 DAY 10/1/24  Yes Candelario Cabello MD   hydrOXYzine pamoate (VISTARIL) 25 MG capsule TAKE 1 CAPSULE FOUR TIMES A DAY AS NEEDED FOR ANXIETY 10/1/24  Yes Candelario Cabello MD   Onabotulinumtoxin A (BOTOX) 200 units injection Inject into the muscle 24  Yes Aide Olea MD   oxyCODONE (ROXICODONE) 5 MG immediate release tablet Take 1 tablet by mouth every 6 hours as needed for Pain for up to 28 days. Intended supply: 3 days. Take lowest dose possible to manage pain Max Daily Amount: 20 mg  Patient taking differently: Take 1 tablet by mouth 2 times daily. Intended supply: 3 days. Take lowest dose possible to manage pain 24 Yes Kayla Lewis MD   Rimegepant Sulfate (NURTEC) 75 MG TBDP Take by mouth as needed   Yes Aide Olea MD   HYDROcodone-acetaminophen  MG TABS per tablet Take 1 tablet by mouth every 6 hours as needed. HYDROCODONE-ACETAMINOPHEN ORAL 10 MG-325 MG  Patient not taking: Reported on 2025    Aide Olea MD   varenicline (CHANTIX) 1 MG tablet Take 1 tablet

## 2025-02-07 NOTE — PROGRESS NOTES
PACU Transfer to [facility]    Vitals:    02/07/25 0903   BP: 116/68   Pulse: 77   Resp: 15   Temp: 97 °F (36.1 °C)   SpO2: 94%         Intake/Output Summary (Last 24 hours) at 2/7/2025 0912  Last data filed at 2/7/2025 0910  Gross per 24 hour   Intake 860 ml   Output 25 ml   Net 835 ml       Pain assessment:    Pain Level: 3    Patient transferred to care of [facility] RN.    2/7/2025 9:12 AM

## 2025-02-07 NOTE — PROGRESS NOTES
Pt arrived form OR, s/p Left breast reconstruction with direct to implant reconstruction with Alloderm - Left , report received form CRNA and OR RN.  Pt sedated on arrival with oral airway.  Received exparel, band on arm

## 2025-02-07 NOTE — PROGRESS NOTES
Ambulatory Surgery/Procedure Discharge Note    Vitals:    02/07/25 0921   BP: 129/83   Pulse: 70   Resp: 14   Temp: 97.3 °F (36.3 °C)   SpO2: 96%   Baldemar score criteria for discharge met.     In: 980 [P.O.:180; I.V.:800]  Out: 25     Restroom use offered before discharge.  Yes    Pain assessment:  level of pain (1-10, 10 severe),   Pain Level: 3    Pt and S.O./family states \"ready to go home\". Pt alert and oriented x4. IV removed. Denies N/V or pain. Dressing C, D & I. Pt tolerating po intake. Discharge instructions given to pt and  with pt permission. Pt and  verbalized understanding of all instructions. Left with all belongings, prescriptions, and discharge instructions.       Patient discharged to home/self care. Patient discharged via wheel chair by transporter to waiting family/S.O.       2/7/2025 10:15 AM

## 2025-02-07 NOTE — DISCHARGE INSTRUCTIONS
[facility] AMBULATORY PROCEDURE DISCHARGE INSTRUCTIONS    There are potential side effects of anesthesia or sedation you may experience for the first 24 hours.  These side effects include:    Confusion or Memory loss, Dizziness, or Delayed Reaction Times   [x]A responsible person should be with you for the next 24 hours.  Do not operate any vehicles (automobiles, bicycles, motorcycles) or power tools or machinery for 24 hours.  Do not sign any legal documents or make any legal decisions for 24 hours. Do not drink alcohol for 24 hours or while taking narcotic pain medication.      Nausea    [x]Start with light diet and progress to your normal diet as you feel like eating. However, if you experience nausea or repeated episodes of vomiting which persist beyond 12-24 hours, notify your physician.  Once nausea has passed, remember to keep drinking fluids.    Difficulty Passing Urine  [x]Drink extra amounts of fluid today.  Notify your physician if you have not urinated within 8 hours after your procedure or you feel uncomfortable.      Irritated Throat from a Breathing Tube  [x]Drink extra amounts of fluid today.  Lozenges may help.    Muscle Aches  [x]You may experience some generalized body aches as your muscles recover from medications used to relax them during surgery.  These will gradually subside.    MEDICATION INSTRUCTIONS:  []Prescription(S) x     sent with you.  Use as directed.  When taking pain medications, you may experience the side effect of dizziness or drowsiness.  Do not drink alcohol or drive when taking these medications.  [x]Prescription(S) x   2       Called to [facility]:    [x]Give the list of your medications to your primary care physician on your next visit. Keep your med list updated and carry it with in case of emergencies.    [x] Narcotic pain medications can cause the side effect of significant constipation.  You may want to add a stool softener to your postoperative medication  wound vac, you can resume daily showers in 24 hours, but avoid very hot water. Do not get the dressing wet as this can diminish the seal activating the alarm on the machine. If the dressing is on your head, you can shower, but do not wet your head.  If the dressing is on an extremity, you can leave that extremity out of the shower, or cover the entire extremity in plastic prior to your shower. If the vac is on your torso, showering may prove difficult and sponge bathing would likely be more appropriate until the dressing is removed.  - Do NOT submerge your vac (e.g. no swimming or baths submerging until instructed)  - Any drains (not the vac) may be attached to a belt or cloth strap while showering to prevent pulling.   - These drains are typically removed within 7 to 10 days after surgery, but may remain in place for longer.  They are removed when drainage is less than 30 cc over a 24-hour period for 2 consecutive days.  Careful recording is important.  Be sure to bring your recordings to your follow-up appointment(s).          - You may have one of the two types of drain dressings:   1. Occlusive dressing: This dressing does not need to be changed unless the                white disc under the plastic (Tegaderm) covering becomes wet. If the white disc              does become wet, please wash hands before starting and have a clean work  surface. Remove the top covering and the disc then redress using the traditional              dressing.   2. Traditional gauze dressing: This dressing is to be changed once each day.               Please wash hands before starting and have clean work surface area to open                  the dressing material on. Open the surgi-bra and remove the old dressing. Open  the packages of dressings and select the gauze with the slit; place the slit                        around the tube (where it comes out of your body). Take the other gauze and                   place it on top. Close

## 2025-02-07 NOTE — ANESTHESIA POSTPROCEDURE EVALUATION
Department of Anesthesiology  Postprocedure Note    Patient: Kaylin Sanchez  MRN: [id number]  YOB: 1980  Date of evaluation: 2/7/2025    Procedure Summary       Date: 02/07/25 Room / Location: [address]    Anesthesia Start: 0715 Anesthesia Stop: 0825    Procedure: Left breast reconstruction with direct to implant reconstruction with Alloderm (Left) Diagnosis:       At high risk for breast cancer      Status post breast reconstruction      (At high risk for breast cancer [Z91.89])      (Status post breast reconstruction [Z98.890])    Surgeons: Ky Whitehead MD Responsible Provider: Candelario Vega MD    Anesthesia Type: general ASA Status: 2            Anesthesia Type: No value filed.    Baldemar Phase I: Baldemar Score: 10    Baldemar Phase II: Baldemar Score: 10    Anesthesia Post Evaluation    Patient location during evaluation: PACU  Patient participation: complete - patient participated  Level of consciousness: awake and alert  Pain score: 3  Airway patency: patent  Nausea & Vomiting: no nausea and no vomiting  Cardiovascular status: hemodynamically stable  Respiratory status: acceptable  Hydration status: euvolemic  Pain management: adequate    No notable events documented.

## 2025-02-07 NOTE — PROGRESS NOTES
at bedside. Pt unable to remove jewelry from nose and ears. Jewelry consent form signed and placed on chart. IV in R hand with LR infusing. Call light within reach. Pt educated on its use. Pt reports no other needs at this time.

## 2025-02-10 ENCOUNTER — TELEPHONE (OUTPATIENT)
Dept: SURGERY | Age: 45
End: 2025-02-10

## 2025-02-10 NOTE — TELEPHONE ENCOUNTER
[phone] (home) [phone] (work)      Called to do a post op check. Patient was not available and a voicemail was left for the patient to call the office.

## 2025-02-16 NOTE — PROGRESS NOTES
[facility] PLASTIC & RECONSTRUCTIVE SURGERY    PROCEDURE: 1) Delayed left direct to implant breast reconstruction  2) Insertion of Alloderm Acellular Dermal Matrix (328 cm^2))   3) Application of Shaniqua incisional wound vacuum device        DATE: 2/7/25    Kaylin Sanchez has been recovering well since her procedure. Pain has been well controlled without pain medications.     EXAM    /75   Pulse 76   Temp 97.7 °F (36.5 °C)   Wt 67.1 kg (148 lb)   SpO2 98%   BMI 26.22 kg/m²       GEN: NAD   BREAST: Wound vac in tact and holding suction. Drains serosang.     IMP: 44 y.o.female s/p Delayed L DTI with shaniqua wound vac   PLAN: Overall doing well. She will follow up Friday at [place] for wound vac removed and drain removal.     Rhonda Kelly, APRN - CNP   [facility] Plastic & Reconstructive Surgery  [phone]  2/17/25

## 2025-02-17 ENCOUNTER — OFFICE VISIT (OUTPATIENT)
Dept: SURGERY | Age: 45
End: 2025-02-17

## 2025-02-17 VITALS
HEART RATE: 76 BPM | TEMPERATURE: 97.7 F | BODY MASS INDEX: 26.22 KG/M2 | SYSTOLIC BLOOD PRESSURE: 118 MMHG | DIASTOLIC BLOOD PRESSURE: 75 MMHG | OXYGEN SATURATION: 98 % | WEIGHT: 148 LBS

## 2025-02-17 DIAGNOSIS — Z09 POSTOP CHECK: Primary | ICD-10-CM

## 2025-02-17 PROCEDURE — 99024 POSTOP FOLLOW-UP VISIT: CPT

## 2025-02-17 RX ORDER — FLUCONAZOLE 150 MG/1
150 TABLET ORAL DAILY
Qty: 3 TABLET | Refills: 0 | Status: SHIPPED | OUTPATIENT
Start: 2025-02-17 | End: 2025-02-20

## 2025-02-21 ENCOUNTER — OFFICE VISIT (OUTPATIENT)
Dept: SURGERY | Age: 45
End: 2025-02-21

## 2025-02-21 VITALS
DIASTOLIC BLOOD PRESSURE: 75 MMHG | BODY MASS INDEX: 26.22 KG/M2 | TEMPERATURE: 97.9 F | OXYGEN SATURATION: 98 % | WEIGHT: 148 LBS | SYSTOLIC BLOOD PRESSURE: 118 MMHG | HEART RATE: 76 BPM

## 2025-02-21 DIAGNOSIS — Z09 POSTOP CHECK: Primary | ICD-10-CM

## 2025-02-21 PROCEDURE — 99024 POSTOP FOLLOW-UP VISIT: CPT

## 2025-02-21 NOTE — PROGRESS NOTES
[facility]ON[facility]    PROCEDURE: 1) Delayed left direct to implant breast reconstruction  2) Insertion of Alloderm Acellular Dermal Matrix (328 cm^2))   3) Application of Shaniqua incisional wound vacuum device        DATE: 2/7/25    Kaylin Sanchez has been recovering well since her procedure. Pain has been well controlled without pain medications. Patient presents today for wound vac take down and drain removal, she notes output less than 30 ml for two consecutive days.     EXAM    /75   Pulse 76   Temp 97.9 °F (36.6 °C)   Wt 67.1 kg (148 lb)   SpO2 98%   BMI 26.22 kg/m²       GEN: NAD   BREAST: Incision site healing appropriately. No hematoma/seroma. Drains serosang.     IMP: 44 y.o.female s/p Delayed L DTI with shaniqua wound vac   PLAN: Doing well overall. Wound vac and drain removed. Patient is happy thus far. Will follow up in 1 month with Dr. Whitehead to ensure continued healing. Will call with any concerns in the interim.      Rhonda Kelly, APRN - CNP   [facility] Plastic & Reconstructive Surgery  [phone]  2/21/25

## 2025-02-28 ENCOUNTER — OFFICE VISIT (OUTPATIENT)
Dept: PRIMARY CARE CLINIC | Age: 45
End: 2025-02-28

## 2025-02-28 VITALS
DIASTOLIC BLOOD PRESSURE: 76 MMHG | SYSTOLIC BLOOD PRESSURE: 120 MMHG | OXYGEN SATURATION: 98 % | BODY MASS INDEX: 28.7 KG/M2 | HEIGHT: 63 IN | WEIGHT: 162 LBS | HEART RATE: 88 BPM

## 2025-02-28 DIAGNOSIS — M54.50 LOW BACK PAIN, UNSPECIFIED BACK PAIN LATERALITY, UNSPECIFIED CHRONICITY, UNSPECIFIED WHETHER SCIATICA PRESENT: ICD-10-CM

## 2025-02-28 DIAGNOSIS — Z00.00 ENCOUNTER FOR WELL ADULT EXAM WITHOUT ABNORMAL FINDINGS: Primary | ICD-10-CM

## 2025-02-28 DIAGNOSIS — E66.3 OVERWEIGHT: ICD-10-CM

## 2025-02-28 RX ORDER — PHENTERMINE HYDROCHLORIDE 37.5 MG/1
37.5 TABLET ORAL
Qty: 30 TABLET | Refills: 0 | Status: SHIPPED | OUTPATIENT
Start: 2025-02-28 | End: 2025-03-30

## 2025-02-28 NOTE — PATIENT INSTRUCTIONS
Patient Education        Acute Low Back Pain: Exercises  Introduction  Here are some examples of typical rehabilitation exercises for your condition. Start each exercise slowly. Ease off the exercise if you start to have pain.  Your doctor or physical therapist will tell you when you can start these exercises and which ones will work best for you.  When you are not being active, find a comfortable position for rest. Some people are comfortable on the floor or a medium-firm bed with a small pillow under their head and another under their knees. Some people prefer to lie on their side with a pillow between their knees. Don't stay in one position for too long.  Take short walks (10 to 20 minutes) every 2 to 3 hours. Avoid slopes, hills, and stairs until you feel better. Walk only distances you can manage without pain, especially leg pain.  How to do the exercise  Cat-cow    Get on your hands and knees. Your shoulders should be directly above your wrists, and your hips should be above your knees. Your back should be flat, and your neck should extend straight out from your spine. Your gaze should be toward the floor below.  Relax your head and allow it to droop. Round your back up toward the ceiling until you feel a nice stretch in your upper, middle, and lower back. Hold this stretch for as long as it feels comfortable, or about 15 to 30 seconds.  Then let your back curve down by pressing your stomach toward the floor. Lift your buttocks toward the ceiling. If it doesn't bother your neck, you can raise your head as you allow your back to sway. Hold this position for 15 to 30 seconds.  Go back and forth smoothly 2 to 4 times between the rounded back and swayed back positions.  If you have a neck problem or injury, keep your neck in the original position in line with your torso instead of moving it with your spine.  Follow-up care is a key part of your treatment and safety. Be sure to make and go to all appointments,

## 2025-02-28 NOTE — PROGRESS NOTES
Well Adult Note  Name: Kaylin Sanchez Today’s Date: 2025   MRN: [id number] Sex: Female   Age: 44 y.o. Ethnicity: Non- / Non    : 1980 Race: White (non-)      Kaylin Sanchez is here for a well adult exam.       Assessment & Plan   Encounter for well adult exam without abnormal findings  Overweight  -     phentermine (ADIPEX-P) 37.5 MG tablet; Take 1 tablet by mouth every morning (before breakfast) for 30 days. Max Daily Amount: 37.5 mg, Disp-30 tablet, R-0Normal  Low back pain, unspecified back pain laterality, unspecified chronicity, unspecified whether sciatica present   See patient instructions for exercises given and demonstrated to patient.  No follow-ups on file.       Subjective   History:  This is a 44-year-old female presents for complete physical exam today.  She is having the following complaints in addition to just wanting to go over her preventative screening.    1.  Patient has been experiencing more low back pain.-She does have chronic back issues.  She has had previous neck surgery as well.  Patient notes that she has been very stiff in the morning with her back.  It hurts throughout the day.  She notes that this causes her to have to rest a lot during the day.  Patient notes currently no radiation down into her extremities but has had this in the past.  Has tried over-the-counter medications and has been not very helpful.    2.  Patient has had significant weight gain.  BMI is 28.7 which is the highest it has been for her.  Patient has been trying to watch her diet to lose weight and has been unable to.  She has been fairly skinny most of her life.  Patient denies any significant issues with over eating.  She cannot exercise as she once did due to musculoskeletal pain.  Patient has had some fatigue but she relates this to stress.    Review of Systems   Constitutional:  Positive for fatigue. Negative for unexpected weight change.   HENT:  Negative for

## 2025-03-02 PROBLEM — Z01.810 PREOP CARDIOVASCULAR EXAM: Status: RESOLVED | Noted: 2025-01-31 | Resolved: 2025-03-02

## 2025-03-05 ASSESSMENT — ENCOUNTER SYMPTOMS
DIARRHEA: 0
CONSTIPATION: 0
TROUBLE SWALLOWING: 0
SHORTNESS OF BREATH: 0
BACK PAIN: 1
EYE PAIN: 0
RHINORRHEA: 0
COLOR CHANGE: 0
CHEST TIGHTNESS: 0

## 2025-03-10 ENCOUNTER — OFFICE VISIT (OUTPATIENT)
Dept: SURGERY | Age: 45
End: 2025-03-10

## 2025-03-10 VITALS
HEART RATE: 87 BPM | HEIGHT: 63 IN | BODY MASS INDEX: 27.91 KG/M2 | TEMPERATURE: 97.5 F | WEIGHT: 157.5 LBS | DIASTOLIC BLOOD PRESSURE: 73 MMHG | SYSTOLIC BLOOD PRESSURE: 102 MMHG

## 2025-03-10 DIAGNOSIS — Z09 POSTOP CHECK: Primary | ICD-10-CM

## 2025-03-10 PROCEDURE — 99024 POSTOP FOLLOW-UP VISIT: CPT | Performed by: SURGERY

## 2025-03-10 NOTE — PROGRESS NOTES
[facility]    PROCEDURE: 1) Delayed left direct to implant breast reconstruction  2) Insertion of Alloderm Acellular Dermal Matrix (328 cm^2))   3) Application of Gaby incisional wound vacuum device      DATE: 2/7/25    Kaylin Sanchez has been recovering well since her procedure. Pain has been well controlled without pain medications.     EXAM    /73 (BP Site: Left Upper Arm)   Pulse 87   Temp 97.5 °F (36.4 °C) (Temporal)   Ht 1.6 m (5' 3\")   Wt 71.4 kg (157 lb 8 oz)   BMI 27.90 kg/m²      GEN: NAD   BREAST: Incision healing well  Improved volume    IMP: 44 y.o.female s/p L delayed DTI  PLAN: Doing well overall. She is happy with her results thus far. She will return in May for fat grafting with likely abdominoplasty once she is at her goal weight.     Job Whitehead MD  [facility]  [phone]  03/10/25

## 2025-05-15 DIAGNOSIS — B00.1 RECURRENT COLD SORES: ICD-10-CM

## 2025-05-15 DIAGNOSIS — F90.9 ATTENTION DEFICIT HYPERACTIVITY DISORDER (ADHD), UNSPECIFIED ADHD TYPE: ICD-10-CM

## 2025-05-16 RX ORDER — DEXTROAMPHETAMINE SACCHARATE, AMPHETAMINE ASPARTATE, DEXTROAMPHETAMINE SULFATE AND AMPHETAMINE SULFATE 5; 5; 5; 5 MG/1; MG/1; MG/1; MG/1
TABLET ORAL
Qty: 60 TABLET | Refills: 0 | Status: SHIPPED | OUTPATIENT
Start: 2025-05-16 | End: 2025-06-14

## 2025-05-16 RX ORDER — VALACYCLOVIR HYDROCHLORIDE 1 G/1
TABLET, FILM COATED ORAL
Qty: 4 TABLET | Refills: 5 | Status: SHIPPED | OUTPATIENT
Start: 2025-05-16

## 2025-05-16 NOTE — TELEPHONE ENCOUNTER
Last Office Visit  -  04/04/2025  Next Office Visit  -  n/a    Last Filled  -    Last UDS -    Contract -

## 2025-06-27 DIAGNOSIS — F90.9 ATTENTION DEFICIT HYPERACTIVITY DISORDER (ADHD), UNSPECIFIED ADHD TYPE: ICD-10-CM

## 2025-06-27 RX ORDER — DEXTROAMPHETAMINE SACCHARATE, AMPHETAMINE ASPARTATE, DEXTROAMPHETAMINE SULFATE AND AMPHETAMINE SULFATE 5; 5; 5; 5 MG/1; MG/1; MG/1; MG/1
TABLET ORAL
Qty: 60 TABLET | Refills: 0 | Status: SHIPPED | OUTPATIENT
Start: 2025-06-27 | End: 2025-07-26

## 2025-06-27 NOTE — TELEPHONE ENCOUNTER
Last Office Visit  -  4/4/25  Next Office Visit  -  n/a    Last Filled  -  5/16/25  Last UDS -  n/a  Contract -  n/a

## 2025-08-03 DIAGNOSIS — F90.9 ATTENTION DEFICIT HYPERACTIVITY DISORDER (ADHD), UNSPECIFIED ADHD TYPE: ICD-10-CM

## 2025-08-04 RX ORDER — DEXTROAMPHETAMINE SACCHARATE, AMPHETAMINE ASPARTATE, DEXTROAMPHETAMINE SULFATE AND AMPHETAMINE SULFATE 5; 5; 5; 5 MG/1; MG/1; MG/1; MG/1
TABLET ORAL
Qty: 60 TABLET | Refills: 0 | Status: SHIPPED | OUTPATIENT
Start: 2025-08-04 | End: 2025-09-01

## (undated) DEVICE — ELECTRODE,ECG,STRESS,FOAM,3PK: Brand: MEDLINE

## (undated) DEVICE — INTENDED USE FOR SURGICAL MARKING ON INTACT SKIN, ALSO PROVIDES A PERMANENT METHOD OF IDENTIFYING OBJECTS IN THE OPERATING ROOM: Brand: WRITESITE® PLUS MINI PREP RESISTANT MARKER

## (undated) DEVICE — SYSTEM SKIN CLSR 22CM DERMBND PRINEO

## (undated) DEVICE — PLASMABLADE PS210-030S 3.0S LOCK: Brand: PLASMABLADE™

## (undated) DEVICE — STERILE PVP: Brand: MEDLINE INDUSTRIES, INC.

## (undated) DEVICE — GLOVE ORANGE PI 8 1/2   MSG9085

## (undated) DEVICE — MINOR BREAST: Brand: MEDLINE INDUSTRIES, INC.

## (undated) DEVICE — GAUZE FLUFF 1 PLY: Brand: DEROYAL

## (undated) DEVICE — PLASTIC MAJOR: Brand: MEDLINE INDUSTRIES, INC.

## (undated) DEVICE — 3M™ TEGADERM™ CHG CHLORHEXIDINE GLUCONATE GEL PAD 1664, 25 EACH/CARTON, 4 CARTONS/CASE: Brand: 3M™ TEGADERM™

## (undated) DEVICE — PAD FOAM 11.75X7-7/8 IN RESTON LF

## (undated) DEVICE — TOWEL,STOP FLAG GOLD N-W: Brand: MEDLINE

## (undated) DEVICE — SHEET,DRAPE,40X58,STERILE: Brand: MEDLINE

## (undated) DEVICE — 3M™ IOBAN™ 2 ANTIMICROBIAL INCISE DRAPE 6648EZ: Brand: IOBAN™ 2

## (undated) DEVICE — CLEANER,CAUTERY TIP,2X2",STERILE: Brand: MEDLINE

## (undated) DEVICE — FORCEPS ENDOSCP BX L230CM DIA28MM ALGTR CUP SPEC RETRV GI

## (undated) DEVICE — KIT HUMD 350ML NSL CANN HI FLOW STRT 7FT O2 TBNG AD CURAPLEX

## (undated) DEVICE — DRAIN SURG 15FR L3 16IN DIA47MM SIL RND HUBLESS FULL FLUT

## (undated) DEVICE — 450 ML BOTTLE OF 0.05% CHLORHEXIDINE GLUCONATE IN 99.95% STERILE WATER FOR IRRIGATION, USP AND APPLICATOR.: Brand: IRRISEPT ANTIMICROBIAL WOUND LAVAGE

## (undated) DEVICE — SURGIFOAM SPNG SZ 100C

## (undated) DEVICE — PACK SURGICAL PROC CUSTOM TISSUE PERFUSION SYSTEM SPY ELITE

## (undated) DEVICE — GOWN,SIRUS,POLYRNF,BRTHSLV,LG,30/CS: Brand: MEDLINE

## (undated) DEVICE — GLOVE SURG SZ 75 L12IN FNGR THK79MIL GRN LTX FREE

## (undated) DEVICE — CONMED SCOPE SAVER BITE BLOCK, 20X27 MM: Brand: SCOPE SAVER

## (undated) DEVICE — Device

## (undated) DEVICE — APPLICATOR MEDICATED 26 CC SOLUTION HI LT ORNG CHLORAPREP

## (undated) DEVICE — NEPTUNE E-SEP SMOKE EVACUATION PENCIL, COATED, 70MM BLADE, PUSH BUTTON SWITCH: Brand: NEPTUNE E-SEP

## (undated) DEVICE — SUTURE PDS + SZ 2 0 L27IN ABSRB VLT L26MM CT 2 1 2 CIR PDP333H

## (undated) DEVICE — COVER,MAYO STAND,XL,STERILE: Brand: MEDLINE

## (undated) DEVICE — SUTURE MONOCRYL SZ 3-0 L27IN ABSRB UD PS-2 3/8 CIR REV CUT NDL MCP427H

## (undated) DEVICE — SUTURE PDS II + SZ 2-0 L27IN ABSRB UD FS-1 L24MM 3/8 CIR REV PDP443H

## (undated) DEVICE — Z DISCONTINUED USE 2276105 GOWN PROTCT UNIV CHST W28IN L49IN SL 24IN BLU SPUNBOND FLM

## (undated) DEVICE — GLOVE ORANGE PI 7 1/2   MSG9075

## (undated) DEVICE — BRA SURG SUPP MED 34-36 IN ZIPPER

## (undated) DEVICE — SUTURE PERMA-HAND SZ 2-0 L30IN NONABSORBABLE BLK L26MM SH K833H

## (undated) DEVICE — GAUZE,SPONGE,4"X4",16PLY,XRAY,STRL,LF: Brand: MEDLINE

## (undated) DEVICE — GARMENT,MEDLINE,DVT,INT,CALF,MED, GEN2: Brand: MEDLINE

## (undated) DEVICE — SYRINGE IRRIG 60ML SFT PLIABLE BLB EZ TO GRP 1 HND USE W/

## (undated) DEVICE — GLOVE SURG SZ 7 L12IN FNGR THK79MIL GRN LTX FREE

## (undated) DEVICE — SUTURE MCRYL SZ 4-0 L27IN ABSRB UD L19MM PS-2 1/2 CIR PRIM Y426H

## (undated) DEVICE — TRAP FLUID

## (undated) DEVICE — Z DISCONTINUED USE 2218423 SUTURE ETHILON SZ 3-0 L18IN NONABSORBABLE BLK FS-1 L24MM 3/8 663H

## (undated) DEVICE — TUBING, SUCTION, 1/4" X 12', STRAIGHT: Brand: MEDLINE

## (undated) DEVICE — PLASMABLADE X PS210-030S-LIGHT 3.0SL: Brand: PLASMABLADE™ X

## (undated) DEVICE — RETRACTOR SURG WIDE FLAT LO PROF LTWT PHOTONGUIDE

## (undated) DEVICE — SYSTEM WND THER 14 DAY 150 CC CANSTR THER UNIT PREVENA + 125

## (undated) DEVICE — NEGATIVE PRESSURE THERAPY KIT 24X22 CM PREVENA RESTOR FRM

## (undated) DEVICE — BLANKET WRM W40.2XL55.9IN IORT LO BODY + MISTRAL AIR

## (undated) DEVICE — ENDO CARRY-ON PROCEDURE KIT INCLUDES SUCTION TUBING, LUBRICANT, GAUZE, BIOHAZARD STICKER, TRANSPORT PAD AND INTERCEPT BEDSIDE KIT.: Brand: ENDO CARRY-ON PROCEDURE KIT

## (undated) DEVICE — SIPS DUAL 2 MINUTE TIP

## (undated) DEVICE — AGENT HEMSTAT 3GM OXIDIZED REGENERATED CELOS ABSRB FOR CONT (ORDER MULTIPLES OF 5EA)

## (undated) DEVICE — NEEDLE,22GX1.5",REG,BEVEL: Brand: MEDLINE

## (undated) DEVICE — GLOVE SURG SZ 65 THK91MIL LTX FREE SYN POLYISOPRENE

## (undated) DEVICE — GLOVE,SURG,SENSICARE,ALOE,LF,PF,7: Brand: MEDLINE

## (undated) DEVICE — SUTURE VCRL SZ 3-0 L27IN ABSRB UD L26MM SH 1/2 CIR J416H

## (undated) DEVICE — HIGH FLOW TIP

## (undated) DEVICE — DRAPE,T,LAPARO,TRANS,STERILE: Brand: MEDLINE